# Patient Record
Sex: FEMALE | Race: WHITE | NOT HISPANIC OR LATINO | Employment: OTHER | URBAN - METROPOLITAN AREA
[De-identification: names, ages, dates, MRNs, and addresses within clinical notes are randomized per-mention and may not be internally consistent; named-entity substitution may affect disease eponyms.]

---

## 2017-01-02 DIAGNOSIS — G25.0 ESSENTIAL TREMOR: ICD-10-CM

## 2017-01-04 RX ORDER — PROPRANOLOL HYDROCHLORIDE 120 MG/1
CAPSULE, EXTENDED RELEASE ORAL
Qty: 90 CAPSULE | Refills: 3 | Status: ON HOLD | OUTPATIENT
Start: 2017-01-04 | End: 2017-07-11 | Stop reason: HOSPADM

## 2017-01-10 ENCOUNTER — TELEPHONE (OUTPATIENT)
Dept: FAMILY MEDICINE | Facility: CLINIC | Age: 82
End: 2017-01-10

## 2017-01-10 DIAGNOSIS — N39.0 URINARY TRACT INFECTION WITHOUT HEMATURIA, SITE UNSPECIFIED: Primary | ICD-10-CM

## 2017-01-10 NOTE — TELEPHONE ENCOUNTER
----- Message from Elvis Wilder sent at 1/10/2017  8:06 AM CST -----  Contact: Patient  Patient called regarding refill on pain medication.patient also have questions regarding inpatient rehab.please call back at [phone]. Thanks,

## 2017-01-10 NOTE — TELEPHONE ENCOUNTER
Spoke with patient and she stated that she will be out of pain medication ( hydrocodone) by Friday and she is still doing therapy which is causing her a lot of pain. Requesting more pain meds called in. Also asked if you could get her in to an inpatient rehab sooner that what they told her openings would become available in September. Please advise

## 2017-01-10 NOTE — TELEPHONE ENCOUNTER
Pt is complaining of possible kidney infection, please order UA to fax to [facility].

## 2017-01-16 PROBLEM — I63.9 STROKE: Status: ACTIVE | Noted: 2017-01-16

## 2017-01-16 PROBLEM — I95.9 HYPOTENSION: Status: ACTIVE | Noted: 2017-01-16

## 2017-01-16 PROBLEM — F05 ACUTE CONFUSIONAL STATE: Status: ACTIVE | Noted: 2017-01-16

## 2017-01-16 PROBLEM — R53.1 GENERALIZED WEAKNESS: Status: ACTIVE | Noted: 2017-01-16

## 2017-01-17 PROBLEM — R41.82 ALTERED MENTAL STATUS: Status: ACTIVE | Noted: 2017-01-17

## 2017-01-17 PROBLEM — I27.81 COR PULMONALE, CHRONIC: Status: ACTIVE | Noted: 2017-01-17

## 2017-01-26 PROBLEM — G93.41 ENCEPHALOPATHY, METABOLIC: Status: ACTIVE | Noted: 2017-01-26

## 2017-01-26 PROBLEM — N39.0 URINARY TRACT INFECTION WITHOUT HEMATURIA: Status: ACTIVE | Noted: 2017-01-26

## 2017-02-03 ENCOUNTER — EXTERNAL VISIT (OUTPATIENT)
Dept: FAMILY MEDICINE | Facility: CLINIC | Age: 82
End: 2017-02-03
Payer: MEDICARE

## 2017-02-03 DIAGNOSIS — I10 ESSENTIAL HYPERTENSION: ICD-10-CM

## 2017-02-03 DIAGNOSIS — M50.30 DDD (DEGENERATIVE DISC DISEASE), CERVICAL: ICD-10-CM

## 2017-02-03 DIAGNOSIS — E03.4 HYPOTHYROIDISM DUE TO ACQUIRED ATROPHY OF THYROID: ICD-10-CM

## 2017-02-03 DIAGNOSIS — I69.354 HEMIPARESIS AFFECTING LEFT SIDE AS LATE EFFECT OF CEREBROVASCULAR ACCIDENT: ICD-10-CM

## 2017-02-03 DIAGNOSIS — I25.10 CORONARY ARTERY DISEASE INVOLVING NATIVE CORONARY ARTERY OF NATIVE HEART WITHOUT ANGINA PECTORIS: ICD-10-CM

## 2017-02-03 DIAGNOSIS — R53.1 WEAKNESS GENERALIZED: Primary | ICD-10-CM

## 2017-02-03 DIAGNOSIS — G25.0 ESSENTIAL TREMOR: ICD-10-CM

## 2017-02-03 PROCEDURE — 99305 1ST NF CARE MODERATE MDM 35: CPT | Mod: S$PBB,,, | Performed by: FAMILY MEDICINE

## 2017-03-17 ENCOUNTER — EXTERNAL VISIT (OUTPATIENT)
Dept: FAMILY MEDICINE | Facility: CLINIC | Age: 82
End: 2017-03-17
Payer: MEDICARE

## 2017-03-17 DIAGNOSIS — G25.0 ESSENTIAL TREMOR: ICD-10-CM

## 2017-03-17 DIAGNOSIS — I10 ESSENTIAL HYPERTENSION: ICD-10-CM

## 2017-03-17 DIAGNOSIS — I25.10 CORONARY ARTERY DISEASE INVOLVING NATIVE CORONARY ARTERY OF NATIVE HEART WITHOUT ANGINA PECTORIS: ICD-10-CM

## 2017-03-17 DIAGNOSIS — E03.4 HYPOTHYROIDISM DUE TO ACQUIRED ATROPHY OF THYROID: ICD-10-CM

## 2017-03-17 DIAGNOSIS — I69.354 HEMIPARESIS AFFECTING LEFT SIDE AS LATE EFFECT OF CEREBROVASCULAR ACCIDENT: ICD-10-CM

## 2017-03-17 DIAGNOSIS — R53.1 WEAKNESS GENERALIZED: Primary | ICD-10-CM

## 2017-03-17 DIAGNOSIS — M50.30 DDD (DEGENERATIVE DISC DISEASE), CERVICAL: ICD-10-CM

## 2017-03-17 PROCEDURE — 99308 SBSQ NF CARE LOW MDM 20: CPT | Mod: S$PBB,,, | Performed by: FAMILY MEDICINE

## 2017-04-07 ENCOUNTER — EXTERNAL VISIT (OUTPATIENT)
Dept: FAMILY MEDICINE | Facility: CLINIC | Age: 82
End: 2017-04-07
Payer: MEDICARE

## 2017-04-07 DIAGNOSIS — R53.1 WEAKNESS GENERALIZED: Primary | ICD-10-CM

## 2017-04-07 DIAGNOSIS — I25.10 CORONARY ARTERY DISEASE INVOLVING NATIVE CORONARY ARTERY OF NATIVE HEART WITHOUT ANGINA PECTORIS: ICD-10-CM

## 2017-04-07 DIAGNOSIS — I10 ESSENTIAL HYPERTENSION: ICD-10-CM

## 2017-04-07 DIAGNOSIS — E03.4 HYPOTHYROIDISM DUE TO ACQUIRED ATROPHY OF THYROID: ICD-10-CM

## 2017-04-07 DIAGNOSIS — I69.354 HEMIPARESIS AFFECTING LEFT SIDE AS LATE EFFECT OF CEREBROVASCULAR ACCIDENT: ICD-10-CM

## 2017-04-07 DIAGNOSIS — G25.0 ESSENTIAL TREMOR: ICD-10-CM

## 2017-04-07 DIAGNOSIS — M50.30 DDD (DEGENERATIVE DISC DISEASE), CERVICAL: ICD-10-CM

## 2017-04-07 PROCEDURE — 99308 SBSQ NF CARE LOW MDM 20: CPT | Mod: S$PBB,,, | Performed by: FAMILY MEDICINE

## 2017-04-21 PROBLEM — I95.9 HYPOTENSION: Status: RESOLVED | Noted: 2017-01-16 | Resolved: 2017-04-21

## 2017-04-21 PROBLEM — G93.41 ENCEPHALOPATHY, METABOLIC: Status: RESOLVED | Noted: 2017-01-26 | Resolved: 2017-04-21

## 2017-04-21 PROBLEM — F05 ACUTE CONFUSIONAL STATE: Status: RESOLVED | Noted: 2017-01-16 | Resolved: 2017-04-21

## 2017-04-21 PROBLEM — I63.9 STROKE: Status: RESOLVED | Noted: 2017-01-16 | Resolved: 2017-04-21

## 2017-04-21 PROBLEM — R53.1 GENERALIZED WEAKNESS: Status: RESOLVED | Noted: 2017-01-16 | Resolved: 2017-04-21

## 2017-04-21 PROBLEM — N39.0 URINARY TRACT INFECTION WITHOUT HEMATURIA: Status: RESOLVED | Noted: 2017-01-26 | Resolved: 2017-04-21

## 2017-04-21 PROBLEM — I27.81 COR PULMONALE, CHRONIC: Status: RESOLVED | Noted: 2017-01-17 | Resolved: 2017-04-21

## 2017-04-21 PROBLEM — R41.82 ALTERED MENTAL STATUS: Status: RESOLVED | Noted: 2017-01-17 | Resolved: 2017-04-21

## 2017-04-21 NOTE — PROGRESS NOTES
[facility] Nursing Home Visit    Subjective:       Patient ID: Martha Peres is a 84 y.o. female.    HPI Comments: Admitted to rehabilitate from recent hospitalization for UTI/weakness.  She is doing PT and progressing well with intention to return home.      Past Medical History:   Diagnosis Date    Anticoagulant long-term use     Arthritis     Benign essential tremor 2000    Bilateral    Breast cancer     left , no B/p or sticks to left    Cataract     ou done    Colon polyps     Coronary artery disease     2 stents    DDD (degenerative disc disease), lumbar     Encounter for blood transfusion     GERD (gastroesophageal reflux disease)     Glaucoma     left eye    Hemiparesis affecting left side as late effect of cerebrovascular accident     HLD (hyperlipidemia)     HTN (hypertension)     Hyperparathyroidism     Hypothyroidism     Mobility impaired     uses wheelchair    Neck pain     radiating to both shoulders and arms    Osteoporosis     PVD (peripheral vascular disease)     Stroke     August 2016       Past Surgical History:   Procedure Laterality Date    APPENDECTOMY      BLADDER SUSPENSION      BREAST LUMPECTOMY      no bp/iv left arm    CARDIAC SURGERY      COLONOSCOPY      CORONARY ANGIOPLASTY WITH STENT PLACEMENT      x 2    Epidural steroid injection      Pain managment    EYE SURGERY      bilat phaco with iol    TONSILLECTOMY      TOTAL ABDOMINAL HYSTERECTOMY         Review of patient's allergies indicates:   Allergen Reactions    No known drug allergies        Social History     Social History    Marital status:      Spouse name: N/A    Number of children: N/A    Years of education: N/A     Occupational History    Not on file.     Social History Main Topics    Smoking status: Never Smoker    Smokeless tobacco: Never Used    Alcohol use No    Drug use: No    Sexual activity: Not on file     Other Topics Concern    Not on file     Social History  Narrative       Current Outpatient Prescriptions on File Prior to Visit   Medication Sig Dispense Refill    ASCORBATE CALCIUM (VITAMIN C ORAL) Take 1 tablet by mouth once daily.       aspirin (ECOTRIN) 81 MG EC tablet Take 1 tablet by mouth once daily.       atorvastatin (LIPITOR) 10 MG tablet TAKE ONE TABLET BY MOUTH EVERY EVENING 30 tablet 4    brimonidine 0.2% (ALPHAGAN) 0.2 % Drop Place 1 drop into both eyes 2 (two) times daily. 10 mL 11    calcium carbonate-vitamin D3 (CALCIUM 600 WITH VITAMIN D3) 600 mg(1,500mg) -500 unit Cap Take 2 tablets by mouth once daily.      clopidogrel (PLAVIX) 75 mg tablet Take 1 tablet (75 mg total) by mouth once daily. 30 tablet 11    dorzolamide (TRUSOPT) 2 % ophthalmic solution INSTILL 1 DROP IN BOTH EYES TWICE DAILY 10 mL 11    ergocalciferol (VITAMIN D2) 50,000 unit Cap Take 50,000 Units by mouth every 7 days. Not sure of dosage//      gabapentin (NEURONTIN) 300 MG capsule 1 tab PO qhs x1 week, then 1 tab PO bid x1 week, then 1 tab PO tid thereafter (Patient taking differently: Take 300 mg by mouth. 1 tab PO qhs x1 week, then 1 tab PO bid x1 week, then 1 tab PO tid thereafter) 90 capsule 2    latanoprost 0.005 % ophthalmic solution PLACE 1 DROP IN BOTH EYES ONCE DAILY AT BEDTIME 7.5 mL 3    levothyroxine (SYNTHROID) 50 MCG tablet TAKE ONE TABLET BY MOUTH BEFORE breakfast 30 tablet 4    lisinopril (PRINIVIL,ZESTRIL) 20 MG tablet Take 1 tablet (20 mg total) by mouth once daily. 30 tablet 5    multivitamin-minerals-lutein Tab Take 1 tablet by mouth once daily.      ondansetron (ZOFRAN) 4 MG tablet Take 1 tablet (4 mg total) by mouth every 8 (eight) hours as needed for Nausea. 30 tablet 0    oxybutynin (DITROPAN) 5 MG Tab Take 1 tablet (5 mg total) by mouth 3 (three) times daily. As needed for bladder spasms. 12 tablet 0    propranolol (INDERAL LA) 120 MG 24 hr capsule TAKE ONE CAPSULE BY MOUTH EVERY DAY 90 capsule 3    rabeprazole (ACIPHEX) 20 mg tablet Take 1  tablet (20 mg total) by mouth every morning. 90 tablet 3     No current facility-administered medications on file prior to visit.        Family History   Problem Relation Age of Onset    Essential Tremor Father     Stroke Father     Heart attack Father     Essential Tremor Sister     Stroke Sister     Essential Tremor Brother     Stroke Brother     Heart attack Brother     Stroke Mother     Heart attack Mother     Stroke Brother     Heart attack Brother     Stroke Brother     Heart attack Brother     Stroke Brother     Heart attack Brother     Stroke Brother     Heart attack Brother     Stroke Sister     Heart disease Sister     Stroke Sister     Heart attack Sister     Stroke Sister     Heart attack Sister     Essential Tremor Daughter     Essential Tremor Son     Anesthesia problems Neg Hx     Clotting disorder Neg Hx     Amblyopia Neg Hx     Blindness Neg Hx     Cancer Neg Hx     Cataracts Neg Hx     Diabetes Neg Hx     Glaucoma Neg Hx     Hypertension Neg Hx     Macular degeneration Neg Hx     Retinal detachment Neg Hx     Strabismus Neg Hx        Review of Systems   Constitutional: Negative for appetite change, chills, fever and unexpected weight change.   HENT: Negative for sore throat and trouble swallowing.    Eyes: Negative for pain and visual disturbance.   Respiratory: Negative for cough, shortness of breath and wheezing.    Cardiovascular: Negative for chest pain and palpitations.   Gastrointestinal: Negative for abdominal pain, blood in stool, diarrhea, nausea and vomiting.   Genitourinary: Negative for difficulty urinating, dysuria and hematuria.   Musculoskeletal: Negative for arthralgias, gait problem and neck pain.   Skin: Negative for rash and wound.   Neurological: Positive for weakness (left arm and leg). Negative for dizziness, numbness and headaches.   Hematological: Negative for adenopathy.   Psychiatric/Behavioral: Negative for dysphoric mood.        Objective:        Physical Exam   Constitutional: She is oriented to person, place, and time. She appears well-developed and well-nourished.   HENT:   Head: Normocephalic.   Mouth/Throat: Oropharynx is clear and moist. No oropharyngeal exudate or posterior oropharyngeal erythema.   Eyes: Conjunctivae and EOM are normal. Pupils are equal, round, and reactive to light.   Neck: Normal range of motion. Neck supple. No thyromegaly present.   Cardiovascular: Normal rate, regular rhythm, S1 normal, S2 normal, normal heart sounds and intact distal pulses.  Exam reveals no gallop and no friction rub.    No murmur heard.  Pulmonary/Chest: Effort normal and breath sounds normal. She has no wheezes. She has no rales.   Abdominal: Normal appearance.   Musculoskeletal:        Right lower leg: She exhibits no edema.        Left lower leg: She exhibits no edema.   Lymphadenopathy:     She has no cervical adenopathy.   Neurological: She is alert and oriented to person, place, and time. No cranial nerve deficit. Coordination and gait abnormal.   Left arm and leg wekaness   Skin: Skin is warm and intact. No rash noted.   Psychiatric: She has a normal mood and affect.       Assessment:       1. Weakness generalized    2. Essential tremor    3. Coronary artery disease involving native coronary artery of native heart without angina pectoris    4. DDD (degenerative disc disease), cervical    5. Essential hypertension    6. Hypothyroidism due to acquired atrophy of thyroid    7. Hemiparesis affecting left side as late effect of cerebrovascular accident        Plan:       Weakness generalized    Essential tremor    Coronary artery disease involving native coronary artery of native heart without angina pectoris    DDD (degenerative disc disease), cervical    Essential hypertension    Hypothyroidism due to acquired atrophy of thyroid    Hemiparesis affecting left side as late effect of cerebrovascular accident        Currently  stable  Continue PT with intention to return home  Continue present medications and specialty follow-up  Will see monthly or as needed        Houston Sheppard M.D.  [facility]

## 2017-04-30 NOTE — PROGRESS NOTES
[facility] Nursing Home Visit    Subjective:       Patient ID: Martha Peres is a 84 y.o. female.    HPI Comments: Admitted to rehabilitate from hospitalization for UTI/weakness.  She is doing PT and progressing well with intention to return home.  No new issues.      Past Medical History:   Diagnosis Date    Anticoagulant long-term use     Arthritis     Benign essential tremor 2000    Bilateral    Breast cancer     left , no B/p or sticks to left    Cataract     ou done    Colon polyps     Coronary artery disease     2 stents    DDD (degenerative disc disease), lumbar     Encounter for blood transfusion     GERD (gastroesophageal reflux disease)     Glaucoma     left eye    Hemiparesis affecting left side as late effect of cerebrovascular accident     HLD (hyperlipidemia)     HTN (hypertension)     Hyperparathyroidism     Hypothyroidism     Mobility impaired     uses wheelchair    Neck pain     radiating to both shoulders and arms    Osteoporosis     PVD (peripheral vascular disease)     Stroke     August 2016       Past Surgical History:   Procedure Laterality Date    APPENDECTOMY      BLADDER SUSPENSION      BREAST LUMPECTOMY      no bp/iv left arm    CARDIAC SURGERY      COLONOSCOPY      CORONARY ANGIOPLASTY WITH STENT PLACEMENT      x 2    Epidural steroid injection      Pain managment    EYE SURGERY      bilat phaco with iol    TONSILLECTOMY      TOTAL ABDOMINAL HYSTERECTOMY         Review of patient's allergies indicates:   Allergen Reactions    No known drug allergies        Social History     Social History    Marital status:      Spouse name: N/A    Number of children: N/A    Years of education: N/A     Occupational History    Not on file.     Social History Main Topics    Smoking status: Never Smoker    Smokeless tobacco: Never Used    Alcohol use No    Drug use: No    Sexual activity: Not on file     Other Topics Concern    Not on file     Social History  Narrative       Current Outpatient Prescriptions on File Prior to Visit   Medication Sig Dispense Refill    ASCORBATE CALCIUM (VITAMIN C ORAL) Take 1 tablet by mouth once daily.       aspirin (ECOTRIN) 81 MG EC tablet Take 1 tablet by mouth once daily.       atorvastatin (LIPITOR) 10 MG tablet TAKE ONE TABLET BY MOUTH EVERY EVENING 30 tablet 4    brimonidine 0.2% (ALPHAGAN) 0.2 % Drop Place 1 drop into both eyes 2 (two) times daily. 10 mL 11    calcium carbonate-vitamin D3 (CALCIUM 600 WITH VITAMIN D3) 600 mg(1,500mg) -500 unit Cap Take 2 tablets by mouth once daily.      clopidogrel (PLAVIX) 75 mg tablet Take 1 tablet (75 mg total) by mouth once daily. 30 tablet 11    dorzolamide (TRUSOPT) 2 % ophthalmic solution INSTILL 1 DROP IN BOTH EYES TWICE DAILY 10 mL 11    ergocalciferol (VITAMIN D2) 50,000 unit Cap Take 50,000 Units by mouth every 7 days. Not sure of dosage//      gabapentin (NEURONTIN) 300 MG capsule 1 tab PO qhs x1 week, then 1 tab PO bid x1 week, then 1 tab PO tid thereafter (Patient taking differently: Take 300 mg by mouth. 1 tab PO qhs x1 week, then 1 tab PO bid x1 week, then 1 tab PO tid thereafter) 90 capsule 2    latanoprost 0.005 % ophthalmic solution PLACE 1 DROP IN BOTH EYES ONCE DAILY AT BEDTIME 7.5 mL 3    levothyroxine (SYNTHROID) 50 MCG tablet TAKE ONE TABLET BY MOUTH BEFORE breakfast 30 tablet 4    lisinopril (PRINIVIL,ZESTRIL) 20 MG tablet Take 1 tablet (20 mg total) by mouth once daily. 30 tablet 5    multivitamin-minerals-lutein Tab Take 1 tablet by mouth once daily.      ondansetron (ZOFRAN) 4 MG tablet Take 1 tablet (4 mg total) by mouth every 8 (eight) hours as needed for Nausea. 30 tablet 0    oxybutynin (DITROPAN) 5 MG Tab Take 1 tablet (5 mg total) by mouth 3 (three) times daily. As needed for bladder spasms. 12 tablet 0    propranolol (INDERAL LA) 120 MG 24 hr capsule TAKE ONE CAPSULE BY MOUTH EVERY DAY 90 capsule 3    rabeprazole (ACIPHEX) 20 mg tablet Take 1  tablet (20 mg total) by mouth every morning. 90 tablet 3     No current facility-administered medications on file prior to visit.        Family History   Problem Relation Age of Onset    Essential Tremor Father     Stroke Father     Heart attack Father     Essential Tremor Sister     Stroke Sister     Essential Tremor Brother     Stroke Brother     Heart attack Brother     Stroke Mother     Heart attack Mother     Stroke Brother     Heart attack Brother     Stroke Brother     Heart attack Brother     Stroke Brother     Heart attack Brother     Stroke Brother     Heart attack Brother     Stroke Sister     Heart disease Sister     Stroke Sister     Heart attack Sister     Stroke Sister     Heart attack Sister     Essential Tremor Daughter     Essential Tremor Son     Anesthesia problems Neg Hx     Clotting disorder Neg Hx     Amblyopia Neg Hx     Blindness Neg Hx     Cancer Neg Hx     Cataracts Neg Hx     Diabetes Neg Hx     Glaucoma Neg Hx     Hypertension Neg Hx     Macular degeneration Neg Hx     Retinal detachment Neg Hx     Strabismus Neg Hx        Review of Systems   Constitutional: Negative for appetite change, chills, fever and unexpected weight change.   HENT: Negative for sore throat and trouble swallowing.    Eyes: Negative for pain and visual disturbance.   Respiratory: Negative for cough, shortness of breath and wheezing.    Cardiovascular: Negative for chest pain and palpitations.   Gastrointestinal: Negative for abdominal pain, blood in stool, diarrhea, nausea and vomiting.   Genitourinary: Negative for difficulty urinating, dysuria and hematuria.   Musculoskeletal: Negative for arthralgias, gait problem and neck pain.   Skin: Negative for rash and wound.   Neurological: Positive for weakness (left arm and leg). Negative for dizziness, numbness and headaches.   Hematological: Negative for adenopathy.   Psychiatric/Behavioral: Negative for dysphoric mood.        Objective:        Physical Exam   Constitutional: She is oriented to person, place, and time. She appears well-developed and well-nourished.   HENT:   Head: Normocephalic.   Mouth/Throat: Oropharynx is clear and moist. No oropharyngeal exudate or posterior oropharyngeal erythema.   Eyes: Conjunctivae and EOM are normal. Pupils are equal, round, and reactive to light.   Neck: Normal range of motion. Neck supple. No thyromegaly present.   Cardiovascular: Normal rate, regular rhythm, S1 normal, S2 normal, normal heart sounds and intact distal pulses.  Exam reveals no gallop and no friction rub.    No murmur heard.  Pulmonary/Chest: Effort normal and breath sounds normal. She has no wheezes. She has no rales.   Abdominal: Normal appearance.   Musculoskeletal:        Right lower leg: She exhibits no edema.        Left lower leg: She exhibits no edema.   Lymphadenopathy:     She has no cervical adenopathy.   Neurological: She is alert and oriented to person, place, and time. No cranial nerve deficit. Coordination and gait abnormal.   Left arm and leg wekaness   Skin: Skin is warm and intact. No rash noted.   Psychiatric: She has a normal mood and affect.       Assessment:       1. Weakness generalized    2. Essential tremor    3. Coronary artery disease involving native coronary artery of native heart without angina pectoris    4. DDD (degenerative disc disease), cervical    5. Essential hypertension    6. Hypothyroidism due to acquired atrophy of thyroid    7. Hemiparesis affecting left side as late effect of cerebrovascular accident        Plan:       Weakness generalized    Essential tremor    Coronary artery disease involving native coronary artery of native heart without angina pectoris    DDD (degenerative disc disease), cervical    Essential hypertension    Hypothyroidism due to acquired atrophy of thyroid    Hemiparesis affecting left side as late effect of cerebrovascular accident        Currently  stable  Continue PT with intention to return home  Continue present medications and specialty follow-up  Will see monthly or as needed        Houston Sheppard M.D.  [facility]

## 2017-04-30 NOTE — PROGRESS NOTES
[facility] Visit    Subjective:       Patient ID: Martha Peres is a 84 y.o. female.    HPI Comments: Admitted to rehabilitate from hospitalization for UTI/weakness.  She is doing PT and progressing well with intention to return home.  No new issues.      Past Medical History:   Diagnosis Date    Anticoagulant long-term use     Arthritis     Benign essential tremor 2000    Bilateral    Breast cancer     left , no B/p or sticks to left    Cataract     ou done    Colon polyps     Coronary artery disease     2 stents    DDD (degenerative disc disease), lumbar     Encounter for blood transfusion     GERD (gastroesophageal reflux disease)     Glaucoma     left eye    Hemiparesis affecting left side as late effect of cerebrovascular accident     HLD (hyperlipidemia)     HTN (hypertension)     Hyperparathyroidism     Hypothyroidism     Mobility impaired     uses wheelchair    Neck pain     radiating to both shoulders and arms    Osteoporosis     PVD (peripheral vascular disease)     Stroke     August 2016       Past Surgical History:   Procedure Laterality Date    APPENDECTOMY      BLADDER SUSPENSION      BREAST LUMPECTOMY      no bp/iv left arm    CARDIAC SURGERY      COLONOSCOPY      CORONARY ANGIOPLASTY WITH STENT PLACEMENT      x 2    Epidural steroid injection      Pain managment    EYE SURGERY      bilat phaco with iol    TONSILLECTOMY      TOTAL ABDOMINAL HYSTERECTOMY         Review of patient's allergies indicates:   Allergen Reactions    No known drug allergies        Social History     Social History    Marital status:      Spouse name: N/A    Number of children: N/A    Years of education: N/A     Occupational History    Not on file.     Social History Main Topics    Smoking status: Never Smoker    Smokeless tobacco: Never Used    Alcohol use No    Drug use: No    Sexual activity: Not on file     Other Topics Concern    Not on file     Social History  Narrative       Current Outpatient Prescriptions on File Prior to Visit   Medication Sig Dispense Refill    ASCORBATE CALCIUM (VITAMIN C ORAL) Take 1 tablet by mouth once daily.       aspirin (ECOTRIN) 81 MG EC tablet Take 1 tablet by mouth once daily.       atorvastatin (LIPITOR) 10 MG tablet TAKE ONE TABLET BY MOUTH EVERY EVENING 30 tablet 4    brimonidine 0.2% (ALPHAGAN) 0.2 % Drop Place 1 drop into both eyes 2 (two) times daily. 10 mL 11    calcium carbonate-vitamin D3 (CALCIUM 600 WITH VITAMIN D3) 600 mg(1,500mg) -500 unit Cap Take 2 tablets by mouth once daily.      clopidogrel (PLAVIX) 75 mg tablet Take 1 tablet (75 mg total) by mouth once daily. 30 tablet 11    dorzolamide (TRUSOPT) 2 % ophthalmic solution INSTILL 1 DROP IN BOTH EYES TWICE DAILY 10 mL 11    ergocalciferol (VITAMIN D2) 50,000 unit Cap Take 50,000 Units by mouth every 7 days. Not sure of dosage//      gabapentin (NEURONTIN) 300 MG capsule 1 tab PO qhs x1 week, then 1 tab PO bid x1 week, then 1 tab PO tid thereafter (Patient taking differently: Take 300 mg by mouth. 1 tab PO qhs x1 week, then 1 tab PO bid x1 week, then 1 tab PO tid thereafter) 90 capsule 2    latanoprost 0.005 % ophthalmic solution PLACE 1 DROP IN BOTH EYES ONCE DAILY AT BEDTIME 7.5 mL 3    levothyroxine (SYNTHROID) 50 MCG tablet TAKE ONE TABLET BY MOUTH BEFORE breakfast 30 tablet 4    lisinopril (PRINIVIL,ZESTRIL) 20 MG tablet Take 1 tablet (20 mg total) by mouth once daily. 30 tablet 5    multivitamin-minerals-lutein Tab Take 1 tablet by mouth once daily.      ondansetron (ZOFRAN) 4 MG tablet Take 1 tablet (4 mg total) by mouth every 8 (eight) hours as needed for Nausea. 30 tablet 0    oxybutynin (DITROPAN) 5 MG Tab Take 1 tablet (5 mg total) by mouth 3 (three) times daily. As needed for bladder spasms. 12 tablet 0    propranolol (INDERAL LA) 120 MG 24 hr capsule TAKE ONE CAPSULE BY MOUTH EVERY DAY 90 capsule 3    rabeprazole (ACIPHEX) 20 mg tablet Take 1  tablet (20 mg total) by mouth every morning. 90 tablet 3     No current facility-administered medications on file prior to visit.        Family History   Problem Relation Age of Onset    Essential Tremor Father     Stroke Father     Heart attack Father     Essential Tremor Sister     Stroke Sister     Essential Tremor Brother     Stroke Brother     Heart attack Brother     Stroke Mother     Heart attack Mother     Stroke Brother     Heart attack Brother     Stroke Brother     Heart attack Brother     Stroke Brother     Heart attack Brother     Stroke Brother     Heart attack Brother     Stroke Sister     Heart disease Sister     Stroke Sister     Heart attack Sister     Stroke Sister     Heart attack Sister     Essential Tremor Daughter     Essential Tremor Son     Anesthesia problems Neg Hx     Clotting disorder Neg Hx     Amblyopia Neg Hx     Blindness Neg Hx     Cancer Neg Hx     Cataracts Neg Hx     Diabetes Neg Hx     Glaucoma Neg Hx     Hypertension Neg Hx     Macular degeneration Neg Hx     Retinal detachment Neg Hx     Strabismus Neg Hx        Review of Systems   Constitutional: Negative for appetite change, chills, fever and unexpected weight change.   HENT: Negative for sore throat and trouble swallowing.    Eyes: Negative for pain and visual disturbance.   Respiratory: Negative for cough, shortness of breath and wheezing.    Cardiovascular: Negative for chest pain and palpitations.   Gastrointestinal: Negative for abdominal pain, blood in stool, diarrhea, nausea and vomiting.   Genitourinary: Negative for difficulty urinating, dysuria and hematuria.   Musculoskeletal: Negative for arthralgias, gait problem and neck pain.   Skin: Negative for rash and wound.   Neurological: Positive for weakness (left arm and leg). Negative for dizziness, numbness and headaches.   Hematological: Negative for adenopathy.   Psychiatric/Behavioral: Negative for dysphoric mood.        Objective:        Physical Exam   Constitutional: She is oriented to person, place, and time. She appears well-developed and well-nourished.   HENT:   Head: Normocephalic.   Mouth/Throat: Oropharynx is clear and moist. No oropharyngeal exudate or posterior oropharyngeal erythema.   Eyes: Conjunctivae and EOM are normal. Pupils are equal, round, and reactive to light.   Neck: Normal range of motion. Neck supple. No thyromegaly present.   Cardiovascular: Normal rate, regular rhythm, S1 normal, S2 normal, normal heart sounds and intact distal pulses.  Exam reveals no gallop and no friction rub.    No murmur heard.  Pulmonary/Chest: Effort normal and breath sounds normal. She has no wheezes. She has no rales.   Abdominal: Normal appearance.   Musculoskeletal:        Right lower leg: She exhibits no edema.        Left lower leg: She exhibits no edema.   Lymphadenopathy:     She has no cervical adenopathy.   Neurological: She is alert and oriented to person, place, and time. No cranial nerve deficit. Coordination and gait abnormal.   Left arm and leg wekaness   Skin: Skin is warm and intact. No rash noted.   Psychiatric: She has a normal mood and affect.       Assessment:       1. Weakness generalized    2. Essential tremor    3. Coronary artery disease involving native coronary artery of native heart without angina pectoris    4. DDD (degenerative disc disease), cervical    5. Essential hypertension    6. Hypothyroidism due to acquired atrophy of thyroid    7. Hemiparesis affecting left side as late effect of cerebrovascular accident        Plan:       Weakness generalized    Essential tremor    Coronary artery disease involving native coronary artery of native heart without angina pectoris    DDD (degenerative disc disease), cervical    Essential hypertension    Hypothyroidism due to acquired atrophy of thyroid    Hemiparesis affecting left side as late effect of cerebrovascular accident        Currently  stable  Continue PT with intention to return home  Continue present medications and specialty follow-up  Will see monthly or as needed        Houston Sheppard M.D.  [facility]

## 2017-05-08 ENCOUNTER — TELEPHONE (OUTPATIENT)
Dept: FAMILY MEDICINE | Facility: CLINIC | Age: 82
End: 2017-05-08

## 2017-05-08 NOTE — TELEPHONE ENCOUNTER
----- Message from Agueda Baxter sent at 5/8/2017 11:08 AM CDT -----  Contact: self [phone]  She was just released from [facility], they instructed her to follow up with you in 2 weeks.  Please call her about fitting her in.  Thank you!

## 2017-05-10 ENCOUNTER — TELEPHONE (OUTPATIENT)
Dept: FAMILY MEDICINE | Facility: CLINIC | Age: 82
End: 2017-05-10

## 2017-05-10 DIAGNOSIS — R30.0 DYSURIA: Primary | ICD-10-CM

## 2017-05-10 NOTE — TELEPHONE ENCOUNTER
----- Message from Dariela Parada sent at 5/10/2017  2:11 PM CDT -----  Contact: Araceli alfonso/ [facility]  Araceli wants to speak with a nurse regarding orders for a urine test. Please call back at [phone]

## 2017-05-10 NOTE — TELEPHONE ENCOUNTER
Left message for  nurse to call clinic, No dx left in message, Patient is scheduled for follow up appointment with Dr Sheppard on 05/19. No sooner appointment available.

## 2017-05-11 ENCOUNTER — LAB VISIT (OUTPATIENT)
Dept: LAB | Facility: HOSPITAL | Age: 82
End: 2017-05-11
Attending: FAMILY MEDICINE
Payer: MEDICARE

## 2017-05-11 DIAGNOSIS — I63.9 IMPENDING CEREBROVASCULAR ACCIDENT: Primary | ICD-10-CM

## 2017-05-11 LAB
BACTERIA #/AREA URNS HPF: ABNORMAL /HPF
BILIRUB UR QL STRIP: NEGATIVE
CLARITY UR: ABNORMAL
COLOR UR: YELLOW
GLUCOSE UR QL STRIP: NEGATIVE
HGB UR QL STRIP: NEGATIVE
KETONES UR QL STRIP: NEGATIVE
LEUKOCYTE ESTERASE UR QL STRIP: NEGATIVE
MICROSCOPIC COMMENT: ABNORMAL
NITRITE UR QL STRIP: NEGATIVE
PH UR STRIP: 7 [PH] (ref 5–8)
PROT UR QL STRIP: NEGATIVE
RBC #/AREA URNS HPF: 2 /HPF (ref 0–4)
SP GR UR STRIP: 1.01 (ref 1–1.03)
SQUAMOUS #/AREA URNS HPF: 1 /HPF
URN SPEC COLLECT METH UR: ABNORMAL
WBC #/AREA URNS HPF: 2 /HPF (ref 0–5)

## 2017-05-11 PROCEDURE — 81000 URINALYSIS NONAUTO W/SCOPE: CPT | Mod: PO

## 2017-05-11 PROCEDURE — 87086 URINE CULTURE/COLONY COUNT: CPT

## 2017-05-11 NOTE — TELEPHONE ENCOUNTER
----- Message from Lottie Crystal sent at 5/11/2017  9:50 AM CDT -----  Contact: [facility],Cristy Muniz wants to speak with a nurse regarding patient having UTI please call back at [phone]

## 2017-05-12 LAB
BACTERIA UR CULT: NORMAL
BACTERIA UR CULT: NORMAL

## 2017-05-19 ENCOUNTER — OFFICE VISIT (OUTPATIENT)
Dept: FAMILY MEDICINE | Facility: CLINIC | Age: 82
End: 2017-05-19
Payer: MEDICARE

## 2017-05-19 VITALS
SYSTOLIC BLOOD PRESSURE: 138 MMHG | HEART RATE: 60 BPM | TEMPERATURE: 98 F | BODY MASS INDEX: 19.2 KG/M2 | DIASTOLIC BLOOD PRESSURE: 60 MMHG | WEIGHT: 105 LBS

## 2017-05-19 DIAGNOSIS — I69.354 HEMIPARESIS AFFECTING LEFT SIDE AS LATE EFFECT OF CEREBROVASCULAR ACCIDENT: Primary | ICD-10-CM

## 2017-05-19 DIAGNOSIS — E03.4 HYPOTHYROIDISM DUE TO ACQUIRED ATROPHY OF THYROID: ICD-10-CM

## 2017-05-19 DIAGNOSIS — I10 ESSENTIAL HYPERTENSION: ICD-10-CM

## 2017-05-19 DIAGNOSIS — E78.5 DYSLIPIDEMIA: ICD-10-CM

## 2017-05-19 DIAGNOSIS — M50.30 DDD (DEGENERATIVE DISC DISEASE), CERVICAL: ICD-10-CM

## 2017-05-19 PROCEDURE — 99213 OFFICE O/P EST LOW 20 MIN: CPT | Mod: PBBFAC,PO | Performed by: FAMILY MEDICINE

## 2017-05-19 PROCEDURE — 99999 PR PBB SHADOW E&M-EST. PATIENT-LVL III: CPT | Mod: PBBFAC,,, | Performed by: FAMILY MEDICINE

## 2017-05-19 PROCEDURE — 99214 OFFICE O/P EST MOD 30 MIN: CPT | Mod: S$PBB,,, | Performed by: FAMILY MEDICINE

## 2017-05-19 RX ORDER — TRAMADOL HYDROCHLORIDE 50 MG/1
50 TABLET ORAL EVERY 6 HOURS PRN
Qty: 60 TABLET | Refills: 5 | Status: SHIPPED | OUTPATIENT
Start: 2017-05-19 | End: 2017-07-26 | Stop reason: SDUPTHER

## 2017-05-19 RX ORDER — TRAMADOL HYDROCHLORIDE 50 MG/1
50 TABLET ORAL EVERY 6 HOURS PRN
COMMUNITY
End: 2017-05-19 | Stop reason: SDUPTHER

## 2017-05-19 NOTE — MR AVS SNAPSHOT
[facility]  [address] [facility] [address]  [facility] [address]  Phone: [phone]  Fax: [phone]                  Martha Peres   2017 11:00 AM   Office Visit    Description:  Female : 1932   Provider:  Houston Sheppard MD   Department:  [facility]           Reason for Visit     Discharged from [facility]           Diagnoses this Visit        Comments    Hemiparesis affecting left side as late effect of cerebrovascular accident    -  Primary     DDD (degenerative disc disease), cervical                To Do List           Future Appointments        Provider Department Dept Phone    2017 10:20 AM Houston Sheppard MD [facility] [phone]      Goals (5 Years of Data)     None      Follow-Up and Disposition     Return in about 4 weeks (around 2017).       These Medications        Disp Refills Start End    tramadol (ULTRAM) 50 mg tablet 60 tablet 5 2017     Take 1 tablet (50 mg total) by mouth every 6 (six) hours as needed for Pain. - Oral    Pharmacy: [facility] [id number] [place] [address] #: [phone]         [facility]     [facility] On Call Nurse Care Line -  Assistance  Unless otherwise directed by your provider, please contact [facility], our nurse care line that is available for  assistance.     Registered nurses in the [facility] provide: appointment scheduling, clinical advisement, health education, and other advisory services.  Call: [phone] (toll free)               Medications           Message regarding Medications     Verify the changes and/or additions to your medication regime listed below are the same as discussed with your clinician today.  If any of these changes or additions are incorrect, please notify your healthcare provider.        START taking these NEW medications        Refills    tramadol (ULTRAM) 50 mg tablet 5     Sig: Take 1 tablet (50 mg total) by mouth every 6 (six) hours as needed for Pain.    Class: Normal    Route: Oral           Verify that the below list of medications is an accurate representation of the medications you are currently taking.  If none reported, the list may be blank. If incorrect, please contact your healthcare provider. Carry this list with you in case of emergency.           Current Medications     ASCORBATE CALCIUM (VITAMIN C ORAL) Take 1 tablet by mouth once daily.     aspirin (ECOTRIN) 81 MG EC tablet Take 1 tablet by mouth once daily.     atorvastatin (LIPITOR) 10 MG tablet TAKE ONE TABLET BY MOUTH EVERY EVENING    brimonidine 0.2% (ALPHAGAN) 0.2 % Drop Place 1 drop into both eyes 2 (two) times daily.    calcium carbonate-vitamin D3 (CALCIUM 600 WITH VITAMIN D3) 600 mg(1,500mg) -500 unit Cap Take 2 tablets by mouth once daily.    clopidogrel (PLAVIX) 75 mg tablet Take 1 tablet (75 mg total) by mouth once daily.    dorzolamide (TRUSOPT) 2 % ophthalmic solution INSTILL 1 DROP IN BOTH EYES TWICE DAILY    ergocalciferol (VITAMIN D2) 50,000 unit Cap Take 50,000 Units by mouth every 7 days. Not sure of dosage//    gabapentin (NEURONTIN) 300 MG capsule 1 tab PO qhs x1 week, then 1 tab PO bid x1 week, then 1 tab PO tid thereafter    latanoprost 0.005 % ophthalmic solution PLACE 1 DROP IN BOTH EYES ONCE DAILY AT BEDTIME    levothyroxine (SYNTHROID) 50 MCG tablet TAKE ONE TABLET BY MOUTH BEFORE breakfast    lisinopril (PRINIVIL,ZESTRIL) 20 MG tablet Take 1 tablet (20 mg total) by mouth once daily.    multivitamin-minerals-lutein Tab Take 1 tablet by mouth once daily.    oxybutynin (DITROPAN) 5 MG Tab Take 1 tablet (5 mg total) by mouth 3 (three) times daily. As needed for bladder spasms.    propranolol (INDERAL LA) 120 MG 24 hr capsule TAKE ONE CAPSULE BY MOUTH EVERY DAY    rabeprazole (ACIPHEX) 20 mg tablet Take 1 tablet (20 mg total) by mouth every morning.    tramadol (ULTRAM) 50 mg tablet Take 1  tablet (50 mg total) by mouth every 6 (six) hours as needed for Pain.    ondansetron (ZOFRAN) 4 MG tablet Take 1 tablet (4 mg total) by mouth every 8 (eight) hours as needed for Nausea.           Clinical Reference Information           Your Vitals Were     BP Pulse Temp Weight BMI    138/60 (BP Location: Right arm, Patient Position: Sitting, BP Method: Manual) 60 97.8 °F (36.6 °C) (Oral) 47.6 kg (105 lb) 19.2 kg/m2      Blood Pressure          Most Recent Value    BP  138/60      Allergies as of 5/19/2017     No Known Drug Allergies      Immunizations Administered on Date of Encounter - 5/19/2017     None      Orders Placed During Today's Visit      Normal Orders This Visit    WALKER FOR HOME USE       Language Assistance Services     ATTENTION: Language assistance services are available, free of charge. Please call [phone].      ATENCIÓN: Si denverla kasey, tiene a pang disposición servicios gratuitos de asistencia lingüística. Llame al [phone].     CHÚ Ý: N?u b?n nói Ti?ng Vi?t, có các d?ch v? h? tr? ngôn ng? mi?n phí dành cho b?n. G?i s? [phone].         [facility] complies with applicable Federal civil rights laws and does not discriminate on the basis of race, color, national origin, age, disability, or sex.

## 2017-05-19 NOTE — PROGRESS NOTES
[facility] Visit    Subjective:       Patient ID: Martha Peres is a 84 y.o. female.    HPI Comments: Here to f/u after recent admission to [facility].  She has transitioned back to home.  She has a 24 hour sitter.  Son lives next door.  Getting [facility] with PT/OT.  She needs a nas-walker      Past Medical History:   Diagnosis Date    Anticoagulant long-term use     Arthritis     Benign essential tremor 2000    Bilateral    Breast cancer     left , no B/p or sticks to left    Cataract     ou done    Colon polyps     Coronary artery disease     2 stents    DDD (degenerative disc disease), lumbar     Encounter for blood transfusion     GERD (gastroesophageal reflux disease)     Glaucoma     left eye    Hemiparesis affecting left side as late effect of cerebrovascular accident     HLD (hyperlipidemia)     HTN (hypertension)     Hyperparathyroidism     Hypothyroidism     Mobility impaired     uses wheelchair    Neck pain     radiating to both shoulders and arms    Osteoporosis     PVD (peripheral vascular disease)     Stroke     August 2016       Past Surgical History:   Procedure Laterality Date    APPENDECTOMY      BLADDER SUSPENSION      BREAST LUMPECTOMY      no bp/iv left arm    CARDIAC SURGERY      COLONOSCOPY      CORONARY ANGIOPLASTY WITH STENT PLACEMENT      x 2    Epidural steroid injection      Pain managment    EYE SURGERY      bilat phaco with iol    TONSILLECTOMY      TOTAL ABDOMINAL HYSTERECTOMY         Review of patient's allergies indicates:   Allergen Reactions    No known drug allergies        Social History     Social History    Marital status:      Spouse name: N/A    Number of children: N/A    Years of education: N/A     Occupational History    Not on file.     Social History Main Topics    Smoking status: Never Smoker    Smokeless tobacco: Never Used    Alcohol use No    Drug use: No    Sexual activity: Not on file      Other Topics Concern    Not on file     Social History Narrative    No narrative on file       Current Outpatient Prescriptions on File Prior to Visit   Medication Sig Dispense Refill    ASCORBATE CALCIUM (VITAMIN C ORAL) Take 1 tablet by mouth once daily.       aspirin (ECOTRIN) 81 MG EC tablet Take 1 tablet by mouth once daily.       atorvastatin (LIPITOR) 10 MG tablet TAKE ONE TABLET BY MOUTH EVERY EVENING 30 tablet 4    brimonidine 0.2% (ALPHAGAN) 0.2 % Drop Place 1 drop into both eyes 2 (two) times daily. 10 mL 11    calcium carbonate-vitamin D3 (CALCIUM 600 WITH VITAMIN D3) 600 mg(1,500mg) -500 unit Cap Take 2 tablets by mouth once daily.      clopidogrel (PLAVIX) 75 mg tablet Take 1 tablet (75 mg total) by mouth once daily. 30 tablet 11    dorzolamide (TRUSOPT) 2 % ophthalmic solution INSTILL 1 DROP IN BOTH EYES TWICE DAILY 10 mL 11    ergocalciferol (VITAMIN D2) 50,000 unit Cap Take 50,000 Units by mouth every 7 days. Not sure of dosage//      gabapentin (NEURONTIN) 300 MG capsule 1 tab PO qhs x1 week, then 1 tab PO bid x1 week, then 1 tab PO tid thereafter (Patient taking differently: Take 300 mg by mouth. 1 tab PO qhs x1 week, then 1 tab PO bid x1 week, then 1 tab PO tid thereafter) 90 capsule 2    latanoprost 0.005 % ophthalmic solution PLACE 1 DROP IN BOTH EYES ONCE DAILY AT BEDTIME 7.5 mL 3    levothyroxine (SYNTHROID) 50 MCG tablet TAKE ONE TABLET BY MOUTH BEFORE breakfast 30 tablet 4    lisinopril (PRINIVIL,ZESTRIL) 20 MG tablet Take 1 tablet (20 mg total) by mouth once daily. 30 tablet 5    multivitamin-minerals-lutein Tab Take 1 tablet by mouth once daily.      oxybutynin (DITROPAN) 5 MG Tab Take 1 tablet (5 mg total) by mouth 3 (three) times daily. As needed for bladder spasms. 12 tablet 0    propranolol (INDERAL LA) 120 MG 24 hr capsule TAKE ONE CAPSULE BY MOUTH EVERY DAY 90 capsule 3    rabeprazole (ACIPHEX) 20 mg tablet Take 1 tablet (20 mg total) by mouth every morning. 90  tablet 3    ondansetron (ZOFRAN) 4 MG tablet Take 1 tablet (4 mg total) by mouth every 8 (eight) hours as needed for Nausea. 30 tablet 0     No current facility-administered medications on file prior to visit.        Family History   Problem Relation Age of Onset    Essential Tremor Father     Stroke Father     Heart attack Father     Essential Tremor Sister     Stroke Sister     Essential Tremor Brother     Stroke Brother     Heart attack Brother     Stroke Mother     Heart attack Mother     Stroke Brother     Heart attack Brother     Stroke Brother     Heart attack Brother     Stroke Brother     Heart attack Brother     Stroke Brother     Heart attack Brother     Stroke Sister     Heart disease Sister     Stroke Sister     Heart attack Sister     Stroke Sister     Heart attack Sister     Essential Tremor Daughter     Essential Tremor Son     Anesthesia problems Neg Hx     Clotting disorder Neg Hx     Amblyopia Neg Hx     Blindness Neg Hx     Cancer Neg Hx     Cataracts Neg Hx     Diabetes Neg Hx     Glaucoma Neg Hx     Hypertension Neg Hx     Macular degeneration Neg Hx     Retinal detachment Neg Hx     Strabismus Neg Hx        Review of Systems   Constitutional: Negative for appetite change, chills, fever and unexpected weight change.   HENT: Negative for sore throat and trouble swallowing.    Eyes: Negative for pain and visual disturbance.   Respiratory: Negative for cough, shortness of breath and wheezing.    Cardiovascular: Negative for chest pain and palpitations.   Gastrointestinal: Negative for abdominal pain, blood in stool, diarrhea, nausea and vomiting.   Genitourinary: Negative for difficulty urinating, dysuria and hematuria.   Musculoskeletal: Negative for arthralgias, gait problem and neck pain.   Skin: Negative for rash and wound.   Neurological: Positive for weakness (left arm and leg). Negative for dizziness, numbness and headaches.   Hematological: Negative  for adenopathy.   Psychiatric/Behavioral: Negative for dysphoric mood.       Objective:        Physical Exam   Constitutional: She is oriented to person, place, and time. She appears well-developed and well-nourished.   HENT:   Head: Normocephalic.   Mouth/Throat: Oropharynx is clear and moist. No oropharyngeal exudate or posterior oropharyngeal erythema.   Eyes: Conjunctivae and EOM are normal. Pupils are equal, round, and reactive to light.   Neck: Normal range of motion. Neck supple. No thyromegaly present.   Cardiovascular: Normal rate, regular rhythm, S1 normal, S2 normal, normal heart sounds and intact distal pulses.  Exam reveals no gallop and no friction rub.    No murmur heard.  Pulmonary/Chest: Effort normal and breath sounds normal. She has no wheezes. She has no rales.   Abdominal: Normal appearance.   Musculoskeletal:        Right lower leg: She exhibits no edema.        Left lower leg: She exhibits no edema.   Lymphadenopathy:     She has no cervical adenopathy.   Neurological: She is alert and oriented to person, place, and time. No cranial nerve deficit. Coordination and gait abnormal.   Left arm and leg wekaness   Skin: Skin is warm and intact. No rash noted.   Psychiatric: She has a normal mood and affect.       Assessment:       1. Hemiparesis affecting left side as late effect of cerebrovascular accident    2. DDD (degenerative disc disease), cervical    3. Hypothyroidism due to acquired atrophy of thyroid    4. Essential hypertension    5. Dyslipidemia        Plan:       Hemiparesis affecting left side as late effect of cerebrovascular accident  -     WALKER FOR HOME USE    DDD (degenerative disc disease), cervical  -     tramadol (ULTRAM) 50 mg tablet; Take 1 tablet (50 mg total) by mouth every 6 (six) hours as needed for Pain.  Dispense: 60 tablet; Refill: 5    Hypothyroidism due to acquired atrophy of thyroid    Essential hypertension    Dyslipidemia          Currently stable  Continue  present medications and specialty follow-up

## 2017-05-25 ENCOUNTER — TELEPHONE (OUTPATIENT)
Dept: FAMILY MEDICINE | Facility: CLINIC | Age: 82
End: 2017-05-25

## 2017-05-25 NOTE — TELEPHONE ENCOUNTER
----- Message from Delta Valencia sent at 5/25/2017 12:10 PM CDT -----  Contact: Ashley alfonso/[facility]  Lorri is requesting a PT extension 2x's a week for 3 weeks starting 5-29-17, also wants to know the name of the company sending pt a wheel chair and walker  Call Back#[phone]  Thanks

## 2017-06-08 ENCOUNTER — TELEPHONE (OUTPATIENT)
Dept: FAMILY MEDICINE | Facility: CLINIC | Age: 82
End: 2017-06-08

## 2017-06-08 NOTE — TELEPHONE ENCOUNTER
Spoke to patient, she does not know anything about [facility] and does not want her records faxed.  Advised her that her records will not be faxed.

## 2017-06-08 NOTE — TELEPHONE ENCOUNTER
----- Message from Misty Escobar sent at 6/8/2017  8:48 AM CDT -----  Contact: [facility] called regarding need for the patient recent office visit notes. Please fax to [facility] [phone]

## 2017-06-14 RX ORDER — RABEPRAZOLE SODIUM 20 MG/1
TABLET, DELAYED RELEASE ORAL
Qty: 30 TABLET | Refills: 0 | Status: SHIPPED | OUTPATIENT
Start: 2017-06-14 | End: 2017-07-22 | Stop reason: SDUPTHER

## 2017-06-15 RX ORDER — LATANOPROST 50 UG/ML
SOLUTION/ DROPS OPHTHALMIC
Qty: 5 ML | OUTPATIENT
Start: 2017-06-15

## 2017-06-15 NOTE — TELEPHONE ENCOUNTER
----- Message from Jose COREA Frisard sent at 6/15/2017  2:32 PM CDT -----  Contact: Claudia/[facility]  Claudia called in regarding the attached patient.  Claudia stated that patient is having some burning when she urinates. Claudia stated that patient old her it has been going on for the last 2 days.  Claudia stated she did leave a specimen cup at patients home in case a sample is needed.  Claudia also stated patient does get frequent UTI's..    Claudia wanted to see if something could be called in for patient.  Patient does have scheduled appt next Friday 6/23/17.      [address] [address]  Phone: [phone] Fax: [phone]    Claudia's call back number is [phone]

## 2017-06-16 ENCOUNTER — LAB VISIT (OUTPATIENT)
Dept: LAB | Facility: HOSPITAL | Age: 82
End: 2017-06-16
Attending: FAMILY MEDICINE
Payer: MEDICARE

## 2017-06-16 DIAGNOSIS — N39.0 URINARY TRACT INFECTION, SITE NOT SPECIFIED: Primary | ICD-10-CM

## 2017-06-16 LAB
BILIRUB UR QL STRIP: NEGATIVE
CLARITY UR: CLEAR
COLOR UR: YELLOW
GLUCOSE UR QL STRIP: NEGATIVE
HGB UR QL STRIP: NEGATIVE
KETONES UR QL STRIP: NEGATIVE
LEUKOCYTE ESTERASE UR QL STRIP: NEGATIVE
NITRITE UR QL STRIP: NEGATIVE
PH UR STRIP: 6 [PH] (ref 5–8)
PROT UR QL STRIP: NEGATIVE
SP GR UR STRIP: 1.01 (ref 1–1.03)
URN SPEC COLLECT METH UR: NORMAL

## 2017-06-16 PROCEDURE — 87086 URINE CULTURE/COLONY COUNT: CPT

## 2017-06-16 PROCEDURE — 81003 URINALYSIS AUTO W/O SCOPE: CPT | Mod: PO

## 2017-06-17 LAB
BACTERIA UR CULT: NORMAL
BACTERIA UR CULT: NORMAL

## 2017-06-22 ENCOUNTER — TELEPHONE (OUTPATIENT)
Dept: FAMILY MEDICINE | Facility: CLINIC | Age: 82
End: 2017-06-22

## 2017-06-22 NOTE — TELEPHONE ENCOUNTER
----- Message from Lottie Crystal sent at 6/22/2017  1:32 PM CDT -----  Contact: [facility]  Patient need a [employer] Authorization on [employer] please call back at Insurance number [phone], any questions please call pharmacy back at [phone]

## 2017-07-10 ENCOUNTER — TELEPHONE (OUTPATIENT)
Dept: OPHTHALMOLOGY | Facility: CLINIC | Age: 82
End: 2017-07-10

## 2017-07-10 PROBLEM — R55 SYNCOPAL EPISODES: Status: ACTIVE | Noted: 2017-07-10

## 2017-07-10 PROBLEM — D64.9 ANEMIA: Status: ACTIVE | Noted: 2017-07-10

## 2017-07-10 PROBLEM — G45.2 MULTIPLE AND BILATERAL PRECEREBRAL ARTERY SYNDROMES: Status: ACTIVE | Noted: 2017-07-10

## 2017-07-10 PROBLEM — R55 NEAR SYNCOPE: Status: ACTIVE | Noted: 2017-07-10

## 2017-07-10 RX ORDER — DORZOLAMIDE HCL 20 MG/ML
SOLUTION/ DROPS OPHTHALMIC
Qty: 10 ML | Refills: 5 | Status: SHIPPED | OUTPATIENT
Start: 2017-07-10 | End: 2018-04-23 | Stop reason: SDUPTHER

## 2017-07-13 ENCOUNTER — TELEPHONE (OUTPATIENT)
Dept: FAMILY MEDICINE | Facility: CLINIC | Age: 82
End: 2017-07-13

## 2017-07-13 NOTE — TELEPHONE ENCOUNTER
----- Message from Miranda Fairchild sent at 7/13/2017  9:50 AM CDT -----  Contact: pt [phone]  Patient called and asked for a call back she just got of the hospital the other day she has a question about a prescription that is supposed to be lowered. Please  Call back for details.

## 2017-07-13 NOTE — TELEPHONE ENCOUNTER
States hospitalist changed her propanolol from 160 to 80 mg daily, and she is asking for new rx to be sent.  Advised her per chart review, Dr Hanley sent new rx to [facility] pharmacy yesterday at 9:25am.  She will check with pharmacy and call back as needed.    Hospital follow up appointment scheduled on 7/26/17.

## 2017-07-14 ENCOUNTER — TELEPHONE (OUTPATIENT)
Dept: FAMILY MEDICINE | Facility: CLINIC | Age: 82
End: 2017-07-14

## 2017-07-14 NOTE — TELEPHONE ENCOUNTER
----- Message from Misty Escobar sent at 7/14/2017 11:07 AM CDT -----  Contact: [facility] called regarding the patient's order for wheelchair. Stating it was fitted for the wheelchair and the company, [facility], needed notes on the patient. Submitted faxes on 6/13 and 6/25.  Wanted to get wheel chair processed for patient and no response. Please contact Lorri [phone].

## 2017-07-14 NOTE — TELEPHONE ENCOUNTER
Lorri, home health nurse, states that patient has been waiting for several weeks on wheelchair.  She called [employer] for status and was told notes were needed from our office.  Advised Lorri no requests have been received, will call [employer].    Call to [facility] [phone], Spoke to Alana, states she will have tech call back.

## 2017-07-17 ENCOUNTER — TELEPHONE (OUTPATIENT)
Dept: FAMILY MEDICINE | Facility: CLINIC | Age: 82
End: 2017-07-17

## 2017-07-17 NOTE — TELEPHONE ENCOUNTER
----- Message from Mirnada Fairchild sent at 7/17/2017 11:10 AM CDT -----  Contact: [employer]    Call placed to pod [employer] called back and asked If you will refax the Chart notes to [phone]

## 2017-07-18 ENCOUNTER — TELEPHONE (OUTPATIENT)
Dept: FAMILY MEDICINE | Facility: CLINIC | Age: 82
End: 2017-07-18

## 2017-07-18 DIAGNOSIS — G25.0 ESSENTIAL TREMOR: ICD-10-CM

## 2017-07-20 ENCOUNTER — TELEPHONE (OUTPATIENT)
Dept: FAMILY MEDICINE | Facility: CLINIC | Age: 82
End: 2017-07-20

## 2017-07-20 NOTE — TELEPHONE ENCOUNTER
[employer] to have Regine call back.  Per Gisselle Chiu at their corporate office noted that patient will need a face to face appt with Dr Sheppard.  Advised [employer] that no one has contacted us or patient to advise of this.

## 2017-07-20 NOTE — TELEPHONE ENCOUNTER
----- Message from Elvis Wilder sent at 7/20/2017 12:42 PM CDT -----  Contact: Patient  Patient called regarding order for wheel chair. Please call back at [phone]. Thanks,

## 2017-07-20 NOTE — TELEPHONE ENCOUNTER
Spoke to [employer].  States patient has been fitted for a custom manual wheelchair, needs face to face in order for medicare to pay for it.  [employer] will fax forms/guide prior to pt's appointment on Wednesday 7/26/17.

## 2017-07-20 NOTE — TELEPHONE ENCOUNTER
Per patient, states she has an appointment next Wednesday with Dr Sheppard.  Will do her face to face at that time.  Pt also reports that her home health nurse Suhas, he initiated the order for custom chair.  Will coordinate details when Angelia from [facility] returns call.

## 2017-07-24 RX ORDER — GABAPENTIN 300 MG/1
CAPSULE ORAL
Qty: 90 CAPSULE | Refills: 3 | Status: SHIPPED | OUTPATIENT
Start: 2017-07-24 | End: 2017-11-18 | Stop reason: SDUPTHER

## 2017-07-24 RX ORDER — RABEPRAZOLE SODIUM 20 MG/1
TABLET, DELAYED RELEASE ORAL
Qty: 30 TABLET | Refills: 4 | Status: SHIPPED | OUTPATIENT
Start: 2017-07-24 | End: 2017-12-15 | Stop reason: SDUPTHER

## 2017-07-26 ENCOUNTER — OFFICE VISIT (OUTPATIENT)
Dept: FAMILY MEDICINE | Facility: CLINIC | Age: 82
End: 2017-07-26
Payer: MEDICARE

## 2017-07-26 VITALS
TEMPERATURE: 98 F | RESPIRATION RATE: 18 BRPM | SYSTOLIC BLOOD PRESSURE: 130 MMHG | WEIGHT: 103.38 LBS | DIASTOLIC BLOOD PRESSURE: 60 MMHG | HEART RATE: 56 BPM | BODY MASS INDEX: 19.02 KG/M2 | HEIGHT: 62 IN

## 2017-07-26 DIAGNOSIS — E03.4 HYPOTHYROIDISM DUE TO ACQUIRED ATROPHY OF THYROID: ICD-10-CM

## 2017-07-26 DIAGNOSIS — M50.30 DDD (DEGENERATIVE DISC DISEASE), CERVICAL: ICD-10-CM

## 2017-07-26 DIAGNOSIS — E78.5 DYSLIPIDEMIA: ICD-10-CM

## 2017-07-26 DIAGNOSIS — I25.10 CORONARY ARTERY DISEASE INVOLVING NATIVE CORONARY ARTERY OF NATIVE HEART WITHOUT ANGINA PECTORIS: ICD-10-CM

## 2017-07-26 DIAGNOSIS — G25.0 ESSENTIAL TREMOR: ICD-10-CM

## 2017-07-26 DIAGNOSIS — I69.354 HEMIPARESIS AFFECTING LEFT SIDE AS LATE EFFECT OF CEREBROVASCULAR ACCIDENT: ICD-10-CM

## 2017-07-26 DIAGNOSIS — Z74.09 MOBILITY IMPAIRED: ICD-10-CM

## 2017-07-26 DIAGNOSIS — I10 ESSENTIAL HYPERTENSION: Primary | ICD-10-CM

## 2017-07-26 PROCEDURE — 99999 PR PBB SHADOW E&M-EST. PATIENT-LVL III: CPT | Mod: PBBFAC,,, | Performed by: FAMILY MEDICINE

## 2017-07-26 PROCEDURE — 99214 OFFICE O/P EST MOD 30 MIN: CPT | Mod: S$PBB,,, | Performed by: FAMILY MEDICINE

## 2017-07-26 PROCEDURE — 99213 OFFICE O/P EST LOW 20 MIN: CPT | Mod: PBBFAC,PO | Performed by: FAMILY MEDICINE

## 2017-07-26 PROCEDURE — 1125F AMNT PAIN NOTED PAIN PRSNT: CPT | Mod: ,,, | Performed by: FAMILY MEDICINE

## 2017-07-26 PROCEDURE — 1159F MED LIST DOCD IN RCRD: CPT | Mod: ,,, | Performed by: FAMILY MEDICINE

## 2017-07-26 RX ORDER — TRAMADOL HYDROCHLORIDE 50 MG/1
100 TABLET ORAL EVERY 8 HOURS PRN
Qty: 180 TABLET | Refills: 5 | Status: SHIPPED | OUTPATIENT
Start: 2017-07-26 | End: 2018-02-09 | Stop reason: SDUPTHER

## 2017-07-26 RX ORDER — LISINOPRIL 20 MG/1
10 TABLET ORAL DAILY
Qty: 15 TABLET | Refills: 5 | Status: ON HOLD | OUTPATIENT
Start: 2017-07-26 | End: 2018-01-02

## 2017-07-26 NOTE — PROGRESS NOTES
[facility] Visit    Subjective:       Patient ID: Martha Peres is a 84 y.o. female.    Here to f/u on chronic health issues.  She has been having intermittent episodes of presyncope. Episodes last for minutes and can be relieved with lying down. 2 episodes this week.  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 On arrival to ER vital signs are stable, blood pressures 1 68 /82 afebrile. Lab workup otherwise unremarkable ultrasound of the leg negative for DVT-Per patients son  has had multiple episodes of syncope over the last couple months-patient becomes unresponsive, limp with no recordable blood pressure or heart rate-with no seizure-like activities-few minutes later patient is back to her baseline, states that she feels lightheaded and dizzy but has no complaints of chest pains with episode and is not related with change of position. Patient has been on Ultram 50 mg every 6 hours started early this year for chronic leg pain. Has had no other systemic complaints. Being admitted as observation for syncopal episodes -possibly cardiac etiology   Hospital Course:   After initial stabilization in ER, [facility] was called to admit for farther evaluation and treatment.   The patient was placed in observation to telemetry floor with a consultation for Cardiology. Patient underwent an ECHO which remained stable from previous examination with documented diastolic dysfunction. Patient had recent negative cardiac evaluation for ischemia so no farther cardiac w/u was pursued with current hospitalization. Carotid US did not document significant stenosis to  require surgical intervention. Patient had repeat negative orthostatics for hypotension and remained asymptomatic throughout her hospital stay. The patient was ultimately transitioned to home in stable conditions with close outpatient follow up.     Propranolol was reduced to 80mg in hospital due to orthostasis.  Still had 2 episode of presyncope since home    She has a 24 hour sitter.  Using wheelchair with assistance for ambulation  Son lives next door and periodically helps..  Getting [facility] with PT/OT.  She is no longer able to use a walker and has been determined to benefit from a custom manual wheelchair.  Using tramadol 1 every 6hours.    This is also a mobility evaluation.  Her previous stroke and left hemiparesis significantly impairs her ability to participate in most MRADLs in customary locations in the home.  Her mobility limitation cannot be sufficiently and safely resolved by using a cane or walker.  She does not meet criteria for a POV.  She has the mental and physical capabilitis to function in a MWC,  Using a MWC will significantly improve her ability to paticipate in MRADLs and the patient will use the MWC in the home.  Her main mobility problem is left hemiparesis from her previous stroke. This loss of strength and movement in her left arm and leg majorly impair her mobility in all ADLs.  She does not have the arm of leg strength to use a cane or walker.  She has the physical and mental abilities to use the custom wheelchair in the home.  The PT evaluation has been reviewed and I agree with the PT findings.        Past Medical History:   Diagnosis Date    Anticoagulant long-term use     Arthritis     Benign essential tremor 2000    Bilateral    Breast cancer     left , no B/p or sticks to left    Cataract     ou done    Colon polyps     Coronary artery disease     2 stents    DDD (degenerative disc disease), lumbar     Encounter for blood transfusion     GERD (gastroesophageal reflux  disease)     Glaucoma     left eye    Hemiparesis affecting left side as late effect of cerebrovascular accident     HLD (hyperlipidemia)     HTN (hypertension)     Hyperparathyroidism     Hypothyroidism     Mobility impaired     uses wheelchair    Neck pain     radiating to both shoulders and arms    Osteoporosis     PVD (peripheral vascular disease)     Stroke     August 2016       Past Surgical History:   Procedure Laterality Date    APPENDECTOMY      BLADDER SUSPENSION      BREAST LUMPECTOMY      no bp/iv left arm    CARDIAC SURGERY      COLONOSCOPY      CORONARY ANGIOPLASTY WITH STENT PLACEMENT      x 2    Epidural steroid injection      Pain managment    EYE SURGERY      bilat phaco with iol    TONSILLECTOMY      TOTAL ABDOMINAL HYSTERECTOMY         Review of patient's allergies indicates:   Allergen Reactions    No known drug allergies        Social History     Social History    Marital status:      Spouse name: N/A    Number of children: N/A    Years of education: N/A     Occupational History    Not on file.     Social History Main Topics    Smoking status: Never Smoker    Smokeless tobacco: Never Used    Alcohol use No    Drug use: No    Sexual activity: Not on file     Other Topics Concern    Not on file     Social History Narrative    No narrative on file       Current Outpatient Prescriptions on File Prior to Visit   Medication Sig Dispense Refill    ASCORBATE CALCIUM (VITAMIN C ORAL) Take 1 tablet by mouth once daily.       aspirin (ECOTRIN) 81 MG EC tablet Take 1 tablet by mouth once daily.       atorvastatin (LIPITOR) 10 MG tablet TAKE ONE TABLET BY MOUTH EVERY EVENING 30 tablet 4    brimonidine 0.2% (ALPHAGAN) 0.2 % Drop Place 1 drop into both eyes 2 (two) times daily. 10 mL 11    calcium carbonate-vitamin D3 (CALCIUM 600 WITH VITAMIN D3) 600 mg(1,500mg) -500 unit Cap Take 2 tablets by mouth once daily.      clopidogrel (PLAVIX) 75 mg tablet Take 1 tablet  (75 mg total) by mouth once daily. 30 tablet 11    dorzolamide (TRUSOPT) 2 % ophthalmic solution instill ONE drop IN EACH EYE TWICE DAILY 10 mL 5    ergocalciferol (VITAMIN D2) 50,000 unit Cap Take 50,000 Units by mouth every 7 days. Not sure of dosage//      gabapentin (NEURONTIN) 300 MG capsule TAKE 1 CAPSULE BY MOUTH AT BEDTIME FOR 1 WEEK, THEN TAKE 1 CAPSULE TWICE DAILY FOR 1 WEEK, THEN TAKE 1 CAPSULE THREE TIMES DAILY thereafter 90 capsule 3    latanoprost 0.005 % ophthalmic solution PLACE 1 DROP IN BOTH EYES ONCE DAILY AT BEDTIME 7.5 mL 3    levothyroxine (SYNTHROID) 50 MCG tablet TAKE ONE TABLET BY MOUTH BEFORE breakfast 30 tablet 4    multivitamin-minerals-lutein Tab Take 1 tablet by mouth once daily.      propranolol (INNOPRAN XL) 80 mg 24 hr capsule Take 1 capsule (80 mg total) by mouth every evening. 30 capsule 11    rabeprazole (ACIPHEX) 20 mg tablet TAKE ONE TABLET BY MOUTH EVERY MORNING 30 tablet 4    ondansetron (ZOFRAN) 4 MG tablet Take 1 tablet (4 mg total) by mouth every 8 (eight) hours as needed for Nausea. 30 tablet 0     No current facility-administered medications on file prior to visit.        Family History   Problem Relation Age of Onset    Essential Tremor Father     Stroke Father     Heart attack Father     Essential Tremor Sister     Stroke Sister     Essential Tremor Brother     Stroke Brother     Heart attack Brother     Stroke Mother     Heart attack Mother     Stroke Brother     Heart attack Brother     Stroke Brother     Heart attack Brother     Stroke Brother     Heart attack Brother     Stroke Brother     Heart attack Brother     Stroke Sister     Heart disease Sister     Stroke Sister     Heart attack Sister     Stroke Sister     Heart attack Sister     Essential Tremor Daughter     Essential Tremor Son     Anesthesia problems Neg Hx     Clotting disorder Neg Hx     Amblyopia Neg Hx     Blindness Neg Hx     Cancer Neg Hx     Cataracts Neg  "Hx     Diabetes Neg Hx     Glaucoma Neg Hx     Hypertension Neg Hx     Macular degeneration Neg Hx     Retinal detachment Neg Hx     Strabismus Neg Hx        Review of Systems   Constitutional: Negative for appetite change, chills, fever and unexpected weight change.   HENT: Negative for sore throat and trouble swallowing.    Eyes: Negative for pain and visual disturbance.   Respiratory: Negative for cough, shortness of breath and wheezing.    Cardiovascular: Negative for chest pain and palpitations.   Gastrointestinal: Negative for abdominal pain, blood in stool, diarrhea, nausea and vomiting.   Genitourinary: Negative for difficulty urinating, dysuria and hematuria.   Musculoskeletal: Negative for arthralgias, gait problem and neck pain.   Skin: Negative for rash and wound.   Neurological: Positive for weakness (left arm and leg). Negative for dizziness, numbness and headaches.   Hematological: Negative for adenopathy.   Psychiatric/Behavioral: Negative for dysphoric mood.       Objective:      /60 (BP Location: Left arm, Patient Position: Sitting, BP Method: Manual)   Pulse (!) 56   Temp 97.8 °F (36.6 °C) (Oral)   Resp 18   Ht 5' 2" (1.575 m)   Wt 46.9 kg (103 lb 6.3 oz)   BMI 18.91 kg/m²     Physical Exam   Constitutional: She is oriented to person, place, and time. She appears well-developed and well-nourished.   HENT:   Head: Normocephalic.   Mouth/Throat: Oropharynx is clear and moist. No oropharyngeal exudate or posterior oropharyngeal erythema.   Eyes: Conjunctivae and EOM are normal. Pupils are equal, round, and reactive to light.   Neck: Normal range of motion. Neck supple. No thyromegaly present.   Cardiovascular: Normal rate, regular rhythm, S1 normal, S2 normal, normal heart sounds and intact distal pulses.  Exam reveals no gallop and no friction rub.    No murmur heard.  Pulmonary/Chest: Effort normal and breath sounds normal. She has no wheezes. She has no rales.   Abdominal: Normal " appearance.   Musculoskeletal:        Right lower leg: She exhibits no edema.        Left lower leg: She exhibits no edema.   Lymphadenopathy:     She has no cervical adenopathy.   Neurological: She is alert and oriented to person, place, and time. She displays tremor. No cranial nerve deficit. Coordination and gait abnormal.   Left arm and leg wekaness   Skin: Skin is warm and intact. No rash noted.   Psychiatric: She has a normal mood and affect.       Hospital records reviewed    Assessment:       1. Essential hypertension    2. DDD (degenerative disc disease), cervical    3. Coronary artery disease involving native coronary artery of native heart without angina pectoris    4. Dyslipidemia    5. Essential tremor    6. Hemiparesis affecting left side as late effect of cerebrovascular accident    7. Hypothyroidism due to acquired atrophy of thyroid    8. Mobility impaired        Plan:       Essential hypertension  -     lisinopril (PRINIVIL,ZESTRIL) 20 MG tablet; Take 0.5 tablets (10 mg total) by mouth once daily.  Dispense: 15 tablet; Refill: 5    DDD (degenerative disc disease), cervical  -     tramadol (ULTRAM) 50 mg tablet; Take 2 tablets (100 mg total) by mouth every 8 (eight) hours as needed for Pain.  Dispense: 180 tablet; Refill: 5    Coronary artery disease involving native coronary artery of native heart without angina pectoris    Dyslipidemia    Essential tremor    Hemiparesis affecting left side as late effect of cerebrovascular accident    Hypothyroidism due to acquired atrophy of thyroid    Mobility impaired        Will fax orders for custom wheelchair which she needs to perform her ADLs  Reduce lisinopril to 10mg daily due to hypotension  Increase tramadol to 100mg every 8 hours  Continue other present medications and specialty follow-up  F/u 1 month to recheck BP and pain control

## 2017-07-27 ENCOUNTER — TELEPHONE (OUTPATIENT)
Dept: FAMILY MEDICINE | Facility: CLINIC | Age: 82
End: 2017-07-27

## 2017-07-27 ENCOUNTER — NURSE TRIAGE (OUTPATIENT)
Dept: ADMINISTRATIVE | Facility: CLINIC | Age: 82
End: 2017-07-27

## 2017-07-27 NOTE — TELEPHONE ENCOUNTER
----- Message from Cassia Jacinto sent at 7/27/2017  8:37 AM CDT -----  Call given to on call nurse / pressure 80/50 [facility]

## 2017-07-27 NOTE — TELEPHONE ENCOUNTER
Per Dr Sheppard, pt to lay flat with feet elevated, drink fluids, continue to monitor BP.  Patient advised, voices understanding.  I then asked to speak to caregiver.      Per caregiver Emmie, BP is now 100/50, pt took am meds which included  lisinopril 10mg, tramadol 100mg, propranolol 80mg, she also took tramadol 100mg and gabapentin 300mg last night at bedtime.  Emmie suspects it may be the increase in tramadol last night and this morning, prior dose was 50mg tid, increased to 100mg at yesterday's visit.  Emmie's callback number is [phone].  Advised Emmie to monitor BP, keep feet elevated and encourage fluids, will call back if further instruction given by Dr Sheppard.  Emmie to call back as needed.

## 2017-07-27 NOTE — TELEPHONE ENCOUNTER
Discontinue lisinopril entirely.  Reduce back to tramadol 50mg PRN pain.  Risk of increasing dose seems to outweight the benefit of better pain control.  No other new meds advised at this time.

## 2017-07-27 NOTE — TELEPHONE ENCOUNTER
Reason for Disposition   Systolic BP < 90 and feeling dizzy, lightheaded, or weak    Protocols used: ST LOW BLOOD PRESSURE-A-OH  Spoke to Candy PT assistant with [facility]. She states Ms. Peres was feeling weak and looked pale. Her initial blood pressure was 80/50 with a pulse of 65. Candy states after fluids patient's blood pressure 88/56. Advised caller to call 911 or send patient to the ED per triage protocol. Caller is requesting further instructions from Dr. Sheppard. She states these reoccurring episodes and  PCP is aware.

## 2017-08-03 ENCOUNTER — TELEPHONE (OUTPATIENT)
Dept: FAMILY MEDICINE | Facility: CLINIC | Age: 82
End: 2017-08-03

## 2017-08-03 NOTE — TELEPHONE ENCOUNTER
----- Message from Cristy Bacon sent at 8/3/2017 10:21 AM CDT -----  Patient requesting to speak with nurse (only information given)please call back at [phone].

## 2017-08-16 ENCOUNTER — TELEPHONE (OUTPATIENT)
Dept: FAMILY MEDICINE | Facility: CLINIC | Age: 82
End: 2017-08-16

## 2017-08-16 NOTE — TELEPHONE ENCOUNTER
----- Message from Maria Esther Pa sent at 8/16/2017  9:43 AM CDT -----  [facility] [phone] is calling concerning to see if she could get an order for a Ua. She is having burning during urination. Please call to advise. She is with the patient right now and would like to do it now. I placed call to pod.

## 2017-08-21 RX ORDER — ATORVASTATIN CALCIUM 10 MG/1
TABLET, FILM COATED ORAL
Qty: 30 TABLET | Refills: 0 | Status: SHIPPED | OUTPATIENT
Start: 2017-08-21 | End: 2017-09-16 | Stop reason: SDUPTHER

## 2017-08-23 ENCOUNTER — TELEPHONE (OUTPATIENT)
Dept: FAMILY MEDICINE | Facility: CLINIC | Age: 82
End: 2017-08-23

## 2017-08-23 DIAGNOSIS — R30.0 DYSURIA: Primary | ICD-10-CM

## 2017-08-23 NOTE — TELEPHONE ENCOUNTER
Spoke to Gisselle with [facility], states that all paperwork has been submitted to medicare, decision is pending, can take up to 30 day for decision.

## 2017-08-23 NOTE — TELEPHONE ENCOUNTER
Patient advised of wheelchair status, voices understanding.  Will follow up in 2 weeks if no updated received prior to then.

## 2017-08-23 NOTE — TELEPHONE ENCOUNTER
----- Message from Maria Esther Pa sent at 8/23/2017 10:45 AM CDT -----  [facility] [phone] is calling concerning patient's urinalysis that was ordered from last week. Please call back for details.

## 2017-08-23 NOTE — TELEPHONE ENCOUNTER
Unable to obtain urine last week, patient with same symptom of burning on urination, will obtain specimen at home visit tomorrow am, orders pended.    Also asking about status on wheelchair, call placed to [facility].

## 2017-08-24 ENCOUNTER — LAB VISIT (OUTPATIENT)
Dept: LAB | Facility: HOSPITAL | Age: 82
End: 2017-08-24
Attending: FAMILY MEDICINE
Payer: MEDICARE

## 2017-08-24 DIAGNOSIS — N39.0 URINARY TRACT INFECTION, SITE NOT SPECIFIED: Primary | ICD-10-CM

## 2017-08-24 LAB
BILIRUB UR QL STRIP: NEGATIVE
CAOX CRY URNS QL MICRO: ABNORMAL
CLARITY UR: CLEAR
COLOR UR: YELLOW
GLUCOSE UR QL STRIP: NEGATIVE
HGB UR QL STRIP: NEGATIVE
KETONES UR QL STRIP: NEGATIVE
LEUKOCYTE ESTERASE UR QL STRIP: ABNORMAL
MICROSCOPIC COMMENT: ABNORMAL
NITRITE UR QL STRIP: NEGATIVE
PH UR STRIP: 7 [PH] (ref 5–8)
PROT UR QL STRIP: NEGATIVE
SP GR UR STRIP: 1.02 (ref 1–1.03)
SQUAMOUS #/AREA URNS HPF: 2 /HPF
URN SPEC COLLECT METH UR: ABNORMAL
WBC #/AREA URNS HPF: 10 /HPF (ref 0–5)

## 2017-08-24 PROCEDURE — 81000 URINALYSIS NONAUTO W/SCOPE: CPT | Mod: PO

## 2017-08-26 ENCOUNTER — TELEPHONE (OUTPATIENT)
Dept: FAMILY MEDICINE | Facility: CLINIC | Age: 82
End: 2017-08-26

## 2017-08-26 DIAGNOSIS — E03.4 IDIOPATHIC ATROPHIC HYPOTHYROIDISM: Primary | ICD-10-CM

## 2017-08-28 RX ORDER — LEVOTHYROXINE SODIUM 50 UG/1
TABLET ORAL
Qty: 30 TABLET | Refills: 0 | Status: SHIPPED | OUTPATIENT
Start: 2017-08-28 | End: 2017-09-29 | Stop reason: SDUPTHER

## 2017-08-28 NOTE — TELEPHONE ENCOUNTER
She is seeing Valarie tomorrow morning, can we draw then?  If so, TSH pended for review, please add as needed.

## 2017-08-28 NOTE — TELEPHONE ENCOUNTER
Need thyroid level on her - she has been ill - but I see [facility] sees her - can they draw that  I refilled thyroid med

## 2017-08-29 ENCOUNTER — OFFICE VISIT (OUTPATIENT)
Dept: FAMILY MEDICINE | Facility: CLINIC | Age: 82
End: 2017-08-29
Payer: MEDICARE

## 2017-08-29 VITALS
HEART RATE: 56 BPM | WEIGHT: 104.25 LBS | BODY MASS INDEX: 19.19 KG/M2 | OXYGEN SATURATION: 99 % | SYSTOLIC BLOOD PRESSURE: 146 MMHG | RESPIRATION RATE: 18 BRPM | DIASTOLIC BLOOD PRESSURE: 60 MMHG | TEMPERATURE: 98 F | HEIGHT: 62 IN

## 2017-08-29 DIAGNOSIS — I69.354 HEMIPARESIS AFFECTING LEFT SIDE AS LATE EFFECT OF CEREBROVASCULAR ACCIDENT: ICD-10-CM

## 2017-08-29 DIAGNOSIS — G25.0 ESSENTIAL TREMOR: ICD-10-CM

## 2017-08-29 DIAGNOSIS — E03.4 HYPOTHYROIDISM DUE TO ACQUIRED ATROPHY OF THYROID: ICD-10-CM

## 2017-08-29 DIAGNOSIS — Z74.09 MOBILITY IMPAIRED: ICD-10-CM

## 2017-08-29 DIAGNOSIS — I10 ESSENTIAL HYPERTENSION: Primary | ICD-10-CM

## 2017-08-29 PROBLEM — R55 SYNCOPAL EPISODES: Status: RESOLVED | Noted: 2017-07-10 | Resolved: 2017-08-29

## 2017-08-29 PROCEDURE — 1126F AMNT PAIN NOTED NONE PRSNT: CPT | Mod: ,,, | Performed by: FAMILY MEDICINE

## 2017-08-29 PROCEDURE — 3077F SYST BP >= 140 MM HG: CPT | Mod: ,,, | Performed by: FAMILY MEDICINE

## 2017-08-29 PROCEDURE — 99213 OFFICE O/P EST LOW 20 MIN: CPT | Mod: PBBFAC,PO | Performed by: FAMILY MEDICINE

## 2017-08-29 PROCEDURE — 1159F MED LIST DOCD IN RCRD: CPT | Mod: ,,, | Performed by: FAMILY MEDICINE

## 2017-08-29 PROCEDURE — 99999 PR PBB SHADOW E&M-EST. PATIENT-LVL III: CPT | Mod: PBBFAC,,, | Performed by: FAMILY MEDICINE

## 2017-08-29 PROCEDURE — 3078F DIAST BP <80 MM HG: CPT | Mod: ,,, | Performed by: FAMILY MEDICINE

## 2017-08-29 PROCEDURE — 99214 OFFICE O/P EST MOD 30 MIN: CPT | Mod: S$PBB,,, | Performed by: FAMILY MEDICINE

## 2017-08-29 NOTE — PROGRESS NOTES
[facility] Home Visit    Subjective:       Patient ID: Martha Peres is a 84 y.o. female.    Here to f/u on chronic health issues.     HPI:     Still has not got recommended custom wheelchair.  She has a 24 hour sitter.  Using wheelchair with assistance for ambulation  Son lives next door and periodically helps..  [facility]  nurse coming weekly.  She is no longer able to use a walker and has been determined to benefit from a custom manual wheelchair.  Using tramadol 1 every 6hours.    This is also a mobility evaluation.  Her previous stroke and left hemiparesis significantly impairs her ability to participate in most MRADLs in customary locations in the home.  Her mobility limitation cannot be sufficiently and safely resolved by using a cane or walker.  She does not meet criteria for a POV.  She has the mental and physical capabilitis to function in a MWC,  Using a MWC will significantly improve her ability to paticipate in MRADLs and the patient will use the MWC in the home.  Her main mobility problem is left hemiparesis from her previous stroke. This loss of strength and movement in her left arm and leg majorly impair her mobility in all ADLs.  She does not have the arm of leg strength to use a cane or walker.  She has the physical and mental abilities to use the custom wheelchair in the home.  The PT evaluation has been reviewed and I agree with the PT findings.      Hypertension   Pertinent negatives include no chest pain, headaches, neck pain, palpitations or shortness of breath.       Past Medical History:   Diagnosis Date    Anticoagulant long-term use     Arthritis     Benign essential tremor 2000    Bilateral    Breast cancer     left , no B/p or sticks to left    Cataract     ou done    Colon polyps     Coronary artery disease     2 stents    DDD (degenerative disc disease), lumbar     Encounter for blood transfusion     GERD (gastroesophageal reflux disease)     Glaucoma     left eye     Hemiparesis affecting left side as late effect of cerebrovascular accident     HLD (hyperlipidemia)     HTN (hypertension)     Hyperparathyroidism     Hypothyroidism     Mobility impaired     uses wheelchair    Neck pain     radiating to both shoulders and arms    Osteoporosis     PVD (peripheral vascular disease)     Stroke     August 2016       Past Surgical History:   Procedure Laterality Date    APPENDECTOMY      BLADDER SUSPENSION      BREAST LUMPECTOMY      no bp/iv left arm    CARDIAC SURGERY      COLONOSCOPY      CORONARY ANGIOPLASTY WITH STENT PLACEMENT      x 2    Epidural steroid injection      Pain managment    EYE SURGERY      bilat phaco with iol    TONSILLECTOMY      TOTAL ABDOMINAL HYSTERECTOMY         Review of patient's allergies indicates:   Allergen Reactions    No known drug allergies        Social History     Social History    Marital status:      Spouse name: N/A    Number of children: N/A    Years of education: N/A     Occupational History    Not on file.     Social History Main Topics    Smoking status: Never Smoker    Smokeless tobacco: Never Used    Alcohol use No    Drug use: No    Sexual activity: Not on file     Other Topics Concern    Not on file     Social History Narrative    No narrative on file       Current Outpatient Prescriptions on File Prior to Visit   Medication Sig Dispense Refill    ASCORBATE CALCIUM (VITAMIN C ORAL) Take 1 tablet by mouth once daily.       aspirin (ECOTRIN) 81 MG EC tablet Take 1 tablet by mouth once daily.       atorvastatin (LIPITOR) 10 MG tablet TAKE ONE TABLET BY MOUTH EVERY EVENING 30 tablet 0    brimonidine 0.2% (ALPHAGAN) 0.2 % Drop Place 1 drop into both eyes 2 (two) times daily. 10 mL 11    calcium carbonate-vitamin D3 (CALCIUM 600 WITH VITAMIN D3) 600 mg(1,500mg) -500 unit Cap Take 2 tablets by mouth once daily.      clopidogrel (PLAVIX) 75 mg tablet Take 1 tablet (75 mg total) by mouth once daily.  30 tablet 11    dorzolamide (TRUSOPT) 2 % ophthalmic solution instill ONE drop IN EACH EYE TWICE DAILY 10 mL 5    ergocalciferol (VITAMIN D2) 50,000 unit Cap Take 50,000 Units by mouth every 7 days. Not sure of dosage//      gabapentin (NEURONTIN) 300 MG capsule TAKE 1 CAPSULE BY MOUTH AT BEDTIME FOR 1 WEEK, THEN TAKE 1 CAPSULE TWICE DAILY FOR 1 WEEK, THEN TAKE 1 CAPSULE THREE TIMES DAILY thereafter 90 capsule 3    latanoprost 0.005 % ophthalmic solution PLACE 1 DROP IN BOTH EYES ONCE DAILY AT BEDTIME 7.5 mL 3    levothyroxine (SYNTHROID) 50 MCG tablet TAKE ONE TABLET BY MOUTH BEFORE breakfast 30 tablet 0    lisinopril (PRINIVIL,ZESTRIL) 20 MG tablet Take 0.5 tablets (10 mg total) by mouth once daily. 15 tablet 5    multivitamin-minerals-lutein Tab Take 1 tablet by mouth once daily.      propranolol (INNOPRAN XL) 80 mg 24 hr capsule Take 1 capsule (80 mg total) by mouth every evening. 30 capsule 11    rabeprazole (ACIPHEX) 20 mg tablet TAKE ONE TABLET BY MOUTH EVERY MORNING 30 tablet 4    tramadol (ULTRAM) 50 mg tablet Take 2 tablets (100 mg total) by mouth every 8 (eight) hours as needed for Pain. (Patient taking differently: Take 50 mg by mouth every 8 (eight) hours as needed for Pain. Takes one 50 mg tablet as directed) 180 tablet 5    ondansetron (ZOFRAN) 4 MG tablet Take 1 tablet (4 mg total) by mouth every 8 (eight) hours as needed for Nausea. 30 tablet 0     No current facility-administered medications on file prior to visit.        Family History   Problem Relation Age of Onset    Essential Tremor Father     Stroke Father     Heart attack Father     Essential Tremor Sister     Stroke Sister     Essential Tremor Brother     Stroke Brother     Heart attack Brother     Stroke Mother     Heart attack Mother     Stroke Brother     Heart attack Brother     Stroke Brother     Heart attack Brother     Stroke Brother     Heart attack Brother     Stroke Brother     Heart attack Brother   "   Stroke Sister     Heart disease Sister     Stroke Sister     Heart attack Sister     Stroke Sister     Heart attack Sister     Essential Tremor Daughter     Essential Tremor Son     Anesthesia problems Neg Hx     Clotting disorder Neg Hx     Amblyopia Neg Hx     Blindness Neg Hx     Cancer Neg Hx     Cataracts Neg Hx     Diabetes Neg Hx     Glaucoma Neg Hx     Hypertension Neg Hx     Macular degeneration Neg Hx     Retinal detachment Neg Hx     Strabismus Neg Hx        Review of Systems   Constitutional: Negative for appetite change, chills, fever and unexpected weight change.   HENT: Negative for sore throat and trouble swallowing.    Eyes: Negative for pain and visual disturbance.   Respiratory: Negative for cough, shortness of breath and wheezing.    Cardiovascular: Negative for chest pain and palpitations.   Gastrointestinal: Negative for abdominal pain, blood in stool, diarrhea, nausea and vomiting.   Genitourinary: Negative for difficulty urinating, dysuria and hematuria.   Musculoskeletal: Negative for arthralgias, gait problem and neck pain.   Skin: Negative for rash and wound.   Neurological: Positive for weakness (left arm and leg). Negative for dizziness, numbness and headaches.   Hematological: Negative for adenopathy.   Psychiatric/Behavioral: Negative for dysphoric mood.       Objective:      BP (!) 146/60   Pulse (!) 56   Temp 97.5 °F (36.4 °C) (Oral)   Resp 18   Ht 5' 2" (1.575 m)   Wt 47.3 kg (104 lb 4.4 oz)   SpO2 99%   BMI 19.07 kg/m²     Physical Exam   Constitutional: She is oriented to person, place, and time. She appears well-developed and well-nourished.   HENT:   Head: Normocephalic.   Mouth/Throat: Oropharynx is clear and moist. No oropharyngeal exudate or posterior oropharyngeal erythema.   Eyes: Conjunctivae and EOM are normal. Pupils are equal, round, and reactive to light.   Neck: Normal range of motion. Neck supple. No thyromegaly present. "   Cardiovascular: Normal rate, regular rhythm, S1 normal, S2 normal, normal heart sounds and intact distal pulses.  Exam reveals no gallop and no friction rub.    No murmur heard.  Pulmonary/Chest: Effort normal and breath sounds normal. She has no wheezes. She has no rales.   Abdominal: Normal appearance.   Musculoskeletal:        Right lower leg: She exhibits no edema.        Left lower leg: She exhibits no edema.   Lymphadenopathy:     She has no cervical adenopathy.   Neurological: She is alert and oriented to person, place, and time. She displays tremor. No cranial nerve deficit. Coordination and gait abnormal.   Left arm and leg wekaness   Skin: Skin is warm and intact. No rash noted.   Psychiatric: She has a normal mood and affect.         Assessment:       1. Essential hypertension    2. Hypothyroidism due to acquired atrophy of thyroid    3. Mobility impaired    4. Hemiparesis affecting left side as late effect of cerebrovascular accident    5. Essential tremor        Plan:       Essential hypertension  -     Comprehensive metabolic panel; Future; Expected date: 11/27/2017    Hypothyroidism due to acquired atrophy of thyroid  -     TSH; Future; Expected date: 11/27/2017    Mobility impaired    Hemiparesis affecting left side as late effect of cerebrovascular accident    Essential tremor        Reemphasize that patient needs custom wheelchair which she needs to perform her ADLs  continue lisinopril to 10mg daily  tramadol to 100mg every 8 hours  Continue other present medications and specialty follow-up  F/u 3 months

## 2017-09-13 ENCOUNTER — TELEPHONE (OUTPATIENT)
Dept: FAMILY MEDICINE | Facility: CLINIC | Age: 82
End: 2017-09-13

## 2017-09-13 NOTE — TELEPHONE ENCOUNTER
----- Message from Aziza Silva LPN sent at 8/29/2017  3:18 PM CDT -----  CALL [facility] TO CHECK STATUS OF CUSTOM WHEEL CHAIR [facility]UM[facility] [phone]/ see previous chart notes

## 2017-09-17 RX ORDER — ATORVASTATIN CALCIUM 10 MG/1
TABLET, FILM COATED ORAL
Qty: 30 TABLET | Refills: 6 | Status: SHIPPED | OUTPATIENT
Start: 2017-09-17 | End: 2018-05-07 | Stop reason: SDUPTHER

## 2017-09-28 NOTE — TELEPHONE ENCOUNTER
----- Message from Jose Lewis sent at 9/28/2017  3:10 PM CDT -----  Contact: Shelby/[facility]  Unsuccessful call placed to office.  Shleby called in and requested a call back regarding the attached patient.  Shelby stated that patient has some pitted ademia in left lower extremity.  Shelby's call back number is [phone]

## 2017-10-02 RX ORDER — LEVOTHYROXINE SODIUM 50 UG/1
TABLET ORAL
Qty: 30 TABLET | Refills: 1 | Status: SHIPPED | OUTPATIENT
Start: 2017-10-02 | End: 2017-11-25 | Stop reason: SDUPTHER

## 2017-10-05 ENCOUNTER — TELEPHONE (OUTPATIENT)
Dept: CARDIOLOGY | Facility: CLINIC | Age: 82
End: 2017-10-05

## 2017-10-05 RX ORDER — FUROSEMIDE 20 MG/1
20 TABLET ORAL DAILY
Qty: 30 TABLET | Refills: 2 | Status: ON HOLD | OUTPATIENT
Start: 2017-10-05 | End: 2019-04-15 | Stop reason: SDUPTHER

## 2017-10-05 RX ORDER — FUROSEMIDE 20 MG/1
20 TABLET ORAL DAILY
COMMUNITY
End: 2017-10-05 | Stop reason: SDUPTHER

## 2017-10-05 NOTE — TELEPHONE ENCOUNTER
----- Message from Cassia Jacinto sent at 10/5/2017  1:00 PM CDT -----  Please call Banner with [facility] home [facility] / [phone]  States she called last week with no response

## 2017-10-05 NOTE — TELEPHONE ENCOUNTER
Spoke with the patient she was advised of the medication lasix called in for her by Penelope ERAZO as well as her follow up appointment scheduled for 10/12. She will call back if she can't make this appointment due to transportation.

## 2017-10-05 NOTE — TELEPHONE ENCOUNTER
Patient due for office visit. Scheduled for next thurs at 1pm- spoke with Melany MUNGUIA unable to reach patient no voicemail available- adivised lasix was sent to pharmacy she will also try and inform patient.

## 2017-10-05 NOTE — TELEPHONE ENCOUNTER
----- Message from Cassia Jacinto sent at 10/5/2017  2:51 PM CDT -----  Please call pt at [phone]  Returning call

## 2017-10-05 NOTE — TELEPHONE ENCOUNTER
----- Message from Cassia Jacinto sent at 10/5/2017  2:51 PM CDT -----  Please call pt at [phone]  Returning call

## 2017-10-09 ENCOUNTER — TELEPHONE (OUTPATIENT)
Dept: CARDIOLOGY | Facility: CLINIC | Age: 82
End: 2017-10-09

## 2017-10-09 ENCOUNTER — TELEPHONE (OUTPATIENT)
Dept: FAMILY MEDICINE | Facility: CLINIC | Age: 82
End: 2017-10-09

## 2017-10-09 DIAGNOSIS — I69.354 HEMIPARESIS AFFECTING LEFT SIDE AS LATE EFFECT OF CEREBROVASCULAR ACCIDENT: Primary | ICD-10-CM

## 2017-10-09 NOTE — TELEPHONE ENCOUNTER
----- Message from Miranda Fairchild sent at 10/9/2017  3:29 PM CDT -----  Contact: pt [phone]  Patient called  And asked for a call back about the prescription that was called in also she has a question about her up coming appointment.

## 2017-10-09 NOTE — TELEPHONE ENCOUNTER
Order for PT for patient. Muscle weakness and getting aquatinted to new wheel chair. Please put in order if possible. Thanks

## 2017-10-09 NOTE — TELEPHONE ENCOUNTER
----- Message from Cassia Jacinto sent at 10/9/2017 12:22 PM CDT -----  Shelby / [facility]  / [phone] ... Asking for physical therapy orders

## 2017-10-16 ENCOUNTER — TELEPHONE (OUTPATIENT)
Dept: OPHTHALMOLOGY | Facility: CLINIC | Age: 82
End: 2017-10-16

## 2017-10-16 RX ORDER — BRIMONIDINE TARTRATE 2 MG/ML
SOLUTION/ DROPS OPHTHALMIC
Qty: 10 ML | Refills: 1 | Status: SHIPPED | OUTPATIENT
Start: 2017-10-16 | End: 2018-02-26 | Stop reason: SDUPTHER

## 2017-10-23 DIAGNOSIS — I63.511 CEREBRAL INFARCTION INVOLVING RIGHT MIDDLE CEREBRAL ARTERY: ICD-10-CM

## 2017-10-23 RX ORDER — CLOPIDOGREL BISULFATE 75 MG/1
TABLET ORAL
Qty: 30 TABLET | Refills: 11 | Status: SHIPPED | OUTPATIENT
Start: 2017-10-23 | End: 2018-11-02 | Stop reason: SDUPTHER

## 2017-11-01 ENCOUNTER — TELEPHONE (OUTPATIENT)
Dept: FAMILY MEDICINE | Facility: CLINIC | Age: 82
End: 2017-11-01

## 2017-11-01 NOTE — TELEPHONE ENCOUNTER
----- Message from Emilia Hsu sent at 11/1/2017  3:00 PM CDT -----  Contact: roger VELASQUEZ   Cough runny nose   Call back     Lungs clear  Please advise

## 2017-11-01 NOTE — TELEPHONE ENCOUNTER
"Spoke with Shelby at [facility] and she stated that she is with the patient. Patient has a clear runny nose, dry cough, and sounds "nasally". Stated that lungs are clear, no fever, and vitals are good. This started a week ago. Please advise on what patient needs to do and we will notify Shelby.   "

## 2017-11-03 NOTE — TELEPHONE ENCOUNTER
Ok for her to try OTC Claritin 10mg daily and Flonase 1 spray per nostril at bedtime for her likely allergy symptoms.

## 2017-11-03 NOTE — TELEPHONE ENCOUNTER
----- Message from Cristy Nolan sent at 11/3/2017  9:35 AM CDT -----  Contact: Priti alfonso/[facility]  Priti states she spoke to nurse a few days ago but has not received a call back contact her at [phone] to follow up.    Thank you

## 2017-11-08 ENCOUNTER — DOCUMENTATION ONLY (OUTPATIENT)
Dept: FAMILY MEDICINE | Facility: CLINIC | Age: 82
End: 2017-11-08

## 2017-11-18 DIAGNOSIS — G25.0 ESSENTIAL TREMOR: ICD-10-CM

## 2017-11-18 RX ORDER — GABAPENTIN 300 MG/1
CAPSULE ORAL
Qty: 90 CAPSULE | Refills: 0 | Status: SHIPPED | OUTPATIENT
Start: 2017-11-18 | End: 2017-12-26 | Stop reason: SDUPTHER

## 2017-11-25 ENCOUNTER — TELEPHONE (OUTPATIENT)
Dept: FAMILY MEDICINE | Facility: CLINIC | Age: 82
End: 2017-11-25

## 2017-11-27 RX ORDER — LEVOTHYROXINE SODIUM 50 UG/1
TABLET ORAL
Qty: 30 TABLET | Refills: 0 | Status: SHIPPED | OUTPATIENT
Start: 2017-11-27 | End: 2018-01-03 | Stop reason: SDUPTHER

## 2017-11-27 RX ORDER — LATANOPROST 50 UG/ML
SOLUTION/ DROPS OPHTHALMIC
Qty: 7.5 ML | Refills: 1 | Status: SHIPPED | OUTPATIENT
Start: 2017-11-27 | End: 2018-10-22 | Stop reason: SDUPTHER

## 2017-11-27 NOTE — TELEPHONE ENCOUNTER
Patient needed to reschedule lab appt. Rescheduled per patient request and verbalized understanding.

## 2017-12-04 ENCOUNTER — LAB VISIT (OUTPATIENT)
Dept: LAB | Facility: HOSPITAL | Age: 82
End: 2017-12-04
Attending: FAMILY MEDICINE
Payer: MEDICARE

## 2017-12-04 DIAGNOSIS — I10 ESSENTIAL HYPERTENSION: ICD-10-CM

## 2017-12-04 DIAGNOSIS — E03.4 HYPOTHYROIDISM DUE TO ACQUIRED ATROPHY OF THYROID: ICD-10-CM

## 2017-12-04 LAB
ALBUMIN SERPL BCP-MCNC: 3.9 G/DL
ALP SERPL-CCNC: 106 U/L
ALT SERPL W/O P-5'-P-CCNC: 10 U/L
ANION GAP SERPL CALC-SCNC: 7 MMOL/L
AST SERPL-CCNC: 18 U/L
BILIRUB SERPL-MCNC: 0.4 MG/DL
BUN SERPL-MCNC: 14 MG/DL
CALCIUM SERPL-MCNC: 10.5 MG/DL
CHLORIDE SERPL-SCNC: 108 MMOL/L
CO2 SERPL-SCNC: 27 MMOL/L
CREAT SERPL-MCNC: 0.7 MG/DL
EST. GFR  (AFRICAN AMERICAN): >60 ML/MIN/1.73 M^2
EST. GFR  (NON AFRICAN AMERICAN): >60 ML/MIN/1.73 M^2
GLUCOSE SERPL-MCNC: 96 MG/DL
POTASSIUM SERPL-SCNC: 4.8 MMOL/L
PROT SERPL-MCNC: 6.9 G/DL
SODIUM SERPL-SCNC: 142 MMOL/L
T4 FREE SERPL-MCNC: 1.09 NG/DL
TSH SERPL DL<=0.005 MIU/L-ACNC: 4.06 UIU/ML

## 2017-12-04 PROCEDURE — 80053 COMPREHEN METABOLIC PANEL: CPT

## 2017-12-04 PROCEDURE — 84439 ASSAY OF FREE THYROXINE: CPT

## 2017-12-04 PROCEDURE — 84443 ASSAY THYROID STIM HORMONE: CPT

## 2017-12-04 PROCEDURE — 36415 COLL VENOUS BLD VENIPUNCTURE: CPT | Mod: PO

## 2017-12-06 ENCOUNTER — OFFICE VISIT (OUTPATIENT)
Dept: FAMILY MEDICINE | Facility: CLINIC | Age: 82
End: 2017-12-06
Payer: MEDICARE

## 2017-12-06 ENCOUNTER — TELEPHONE (OUTPATIENT)
Dept: FAMILY MEDICINE | Facility: CLINIC | Age: 82
End: 2017-12-06

## 2017-12-06 VITALS
SYSTOLIC BLOOD PRESSURE: 112 MMHG | DIASTOLIC BLOOD PRESSURE: 70 MMHG | OXYGEN SATURATION: 100 % | HEIGHT: 62 IN | HEART RATE: 71 BPM

## 2017-12-06 DIAGNOSIS — M51.36 DDD (DEGENERATIVE DISC DISEASE), LUMBAR: ICD-10-CM

## 2017-12-06 DIAGNOSIS — M54.16 LUMBAR RADICULOPATHY: Primary | ICD-10-CM

## 2017-12-06 DIAGNOSIS — E03.4 HYPOTHYROIDISM DUE TO ACQUIRED ATROPHY OF THYROID: ICD-10-CM

## 2017-12-06 DIAGNOSIS — I10 ESSENTIAL HYPERTENSION: ICD-10-CM

## 2017-12-06 PROCEDURE — 99999 PR PBB SHADOW E&M-EST. PATIENT-LVL IV: CPT | Mod: PBBFAC,,, | Performed by: FAMILY MEDICINE

## 2017-12-06 PROCEDURE — 99214 OFFICE O/P EST MOD 30 MIN: CPT | Mod: S$PBB,,, | Performed by: FAMILY MEDICINE

## 2017-12-06 PROCEDURE — 99214 OFFICE O/P EST MOD 30 MIN: CPT | Mod: PBBFAC,PO | Performed by: FAMILY MEDICINE

## 2017-12-06 PROCEDURE — G0008 ADMIN INFLUENZA VIRUS VAC: HCPCS | Mod: PBBFAC,PO

## 2017-12-06 PROCEDURE — 96372 THER/PROPH/DIAG INJ SC/IM: CPT | Mod: PBBFAC,59,PO

## 2017-12-06 RX ORDER — HYDROCODONE BITARTRATE AND ACETAMINOPHEN 5; 325 MG/1; MG/1
1 TABLET ORAL EVERY 6 HOURS PRN
Qty: 30 TABLET | Refills: 0 | Status: SHIPPED | OUTPATIENT
Start: 2017-12-06 | End: 2017-12-15 | Stop reason: SDUPTHER

## 2017-12-06 RX ORDER — KETOROLAC TROMETHAMINE 30 MG/ML
30 INJECTION, SOLUTION INTRAMUSCULAR; INTRAVENOUS
Status: COMPLETED | OUTPATIENT
Start: 2017-12-06 | End: 2017-12-06

## 2017-12-06 RX ADMIN — KETOROLAC TROMETHAMINE 30 MG: 30 INJECTION, SOLUTION INTRAMUSCULAR at 11:12

## 2017-12-06 NOTE — TELEPHONE ENCOUNTER
Spoke with patient and she stated that she is having pain but not sure she should come in for appointment. Advised that she needs to come in if she is not feeling well so it can be addressed. No available appts until 12/26. Verbalized understanding and stated that she will come in today as scheduled.

## 2017-12-06 NOTE — PROGRESS NOTES
[facility] Home Visit    Subjective:       Patient ID: Martha Peres is a 85 y.o. female.    Here for 3 month f/u on chronic health issues.     HPI:     C/o wornsened left side pain for the past 3 days  Taking gabapentin 3 times daily and tramadol TID    She has a 24 hour sitter.  Using wheelchair with assistance for ambulation  Son lives next door and periodically helps..  [facility]  nurse coming weekly.  Using tramadol 1 every 6hours.        Hypertension   Pertinent negatives include no chest pain, headaches, neck pain, palpitations or shortness of breath.       Past Medical History:   Diagnosis Date    Anticoagulant long-term use     Arthritis     Benign essential tremor 2000    Bilateral    Breast cancer     left , no B/p or sticks to left    Cataract     ou done    Colon polyps     Coronary artery disease     2 stents    DDD (degenerative disc disease), lumbar     Encounter for blood transfusion     GERD (gastroesophageal reflux disease)     Glaucoma     left eye    Hemiparesis affecting left side as late effect of cerebrovascular accident     HLD (hyperlipidemia)     HTN (hypertension)     Hyperparathyroidism     Hypothyroidism     Mobility impaired     uses wheelchair    Neck pain     radiating to both shoulders and arms    Osteoporosis     PVD (peripheral vascular disease)     Stroke     August 2016       Past Surgical History:   Procedure Laterality Date    APPENDECTOMY      BLADDER SUSPENSION      BREAST LUMPECTOMY      no bp/iv left arm    CARDIAC SURGERY      COLONOSCOPY      CORONARY ANGIOPLASTY WITH STENT PLACEMENT      x 2    Epidural steroid injection      Pain managment    EYE SURGERY      bilat phaco with iol    TONSILLECTOMY      TOTAL ABDOMINAL HYSTERECTOMY         Review of patient's allergies indicates:   Allergen Reactions    No known drug allergies        Social History     Social History    Marital status:      Spouse name: N/A    Number of  children: N/A    Years of education: N/A     Occupational History    Not on file.     Social History Main Topics    Smoking status: Never Smoker    Smokeless tobacco: Never Used    Alcohol use No    Drug use: No    Sexual activity: Not on file     Other Topics Concern    Not on file     Social History Narrative    No narrative on file       Current Outpatient Prescriptions on File Prior to Visit   Medication Sig Dispense Refill    aspirin (ECOTRIN) 81 MG EC tablet Take 1 tablet by mouth once daily.       atorvastatin (LIPITOR) 10 MG tablet TAKE ONE TABLET BY MOUTH EVERY evening 30 tablet 6    brimonidine 0.2% (ALPHAGAN) 0.2 % Drop instill ONE DROP IN EACH EYE TWICE DAILY 10 mL 1    calcium carbonate-vitamin D3 (CALCIUM 600 WITH VITAMIN D3) 600 mg(1,500mg) -500 unit Cap Take 2 tablets by mouth once daily.      clopidogrel (PLAVIX) 75 mg tablet TAKE ONE TABLET BY MOUTH EVERY DAY 30 tablet 11    dorzolamide (TRUSOPT) 2 % ophthalmic solution instill ONE drop IN EACH EYE TWICE DAILY 10 mL 5    ergocalciferol (VITAMIN D2) 50,000 unit Cap Take 50,000 Units by mouth every 7 days. Not sure of dosage//      furosemide (LASIX) 20 MG tablet Take 1 tablet (20 mg total) by mouth once daily. 30 tablet 2    gabapentin (NEURONTIN) 300 MG capsule TAKE 1 CAPSULE BY MOUTH AT BEDTIME FOR 1 WEEK, THEN TAKE 1 CAPSULE TWICE DAILY FOR 1 WEEK, THEN TAKE 1 CAPSULE THREE TIMES DAILY thereafter 90 capsule 0    latanoprost 0.005 % ophthalmic solution instill ONE drop IN EACH EYE AT BEDTIME 7.5 mL 1    levothyroxine (SYNTHROID) 50 MCG tablet TAKE ONE TABLET BY MOUTH BEFORE breakfast 30 tablet 0    lisinopril (PRINIVIL,ZESTRIL) 20 MG tablet Take 0.5 tablets (10 mg total) by mouth once daily. 15 tablet 5    multivitamin-minerals-lutein Tab Take 1 tablet by mouth once daily.      ondansetron (ZOFRAN) 4 MG tablet Take 1 tablet (4 mg total) by mouth every 8 (eight) hours as needed for Nausea. 30 tablet 0    propranolol  (INNOPRAN XL) 80 mg 24 hr capsule Take 1 capsule (80 mg total) by mouth every evening. 30 capsule 11    rabeprazole (ACIPHEX) 20 mg tablet TAKE ONE TABLET BY MOUTH EVERY MORNING 30 tablet 4    tramadol (ULTRAM) 50 mg tablet Take 2 tablets (100 mg total) by mouth every 8 (eight) hours as needed for Pain. (Patient taking differently: Take 50 mg by mouth every 8 (eight) hours as needed for Pain. Takes one 50 mg tablet as directed) 180 tablet 5    ASCORBATE CALCIUM (VITAMIN C ORAL) Take 1 tablet by mouth once daily.        No current facility-administered medications on file prior to visit.        Family History   Problem Relation Age of Onset    Essential Tremor Father     Stroke Father     Heart attack Father     Essential Tremor Sister     Stroke Sister     Essential Tremor Brother     Stroke Brother     Heart attack Brother     Stroke Mother     Heart attack Mother     Stroke Brother     Heart attack Brother     Stroke Brother     Heart attack Brother     Stroke Brother     Heart attack Brother     Stroke Brother     Heart attack Brother     Stroke Sister     Heart disease Sister     Stroke Sister     Heart attack Sister     Stroke Sister     Heart attack Sister     Essential Tremor Daughter     Essential Tremor Son     Anesthesia problems Neg Hx     Clotting disorder Neg Hx     Amblyopia Neg Hx     Blindness Neg Hx     Cancer Neg Hx     Cataracts Neg Hx     Diabetes Neg Hx     Glaucoma Neg Hx     Hypertension Neg Hx     Macular degeneration Neg Hx     Retinal detachment Neg Hx     Strabismus Neg Hx        Review of Systems   Constitutional: Negative for appetite change, chills, fever and unexpected weight change.   HENT: Negative for sore throat and trouble swallowing.    Eyes: Negative for pain and visual disturbance.   Respiratory: Negative for cough, shortness of breath and wheezing.    Cardiovascular: Negative for chest pain and palpitations.   Gastrointestinal: Negative  "for abdominal pain, blood in stool, diarrhea, nausea and vomiting.   Genitourinary: Negative for difficulty urinating, dysuria and hematuria.   Musculoskeletal: Positive for arthralgias, back pain and gait problem. Negative for neck pain.   Skin: Negative for rash and wound.   Neurological: Positive for weakness (left arm and leg). Negative for dizziness, numbness and headaches.   Hematological: Negative for adenopathy.   Psychiatric/Behavioral: Negative for dysphoric mood.       Objective:      /70 (BP Location: Left arm, Patient Position: Sitting, BP Method: Large (Manual))   Pulse 71   Ht 5' 2" (1.575 m)   SpO2 100%     Physical Exam   Constitutional: She is oriented to person, place, and time. She appears well-developed and well-nourished.   HENT:   Head: Normocephalic.   Mouth/Throat: Oropharynx is clear and moist. No oropharyngeal exudate or posterior oropharyngeal erythema.   Eyes: Conjunctivae and EOM are normal. Pupils are equal, round, and reactive to light.   Neck: Normal range of motion. Neck supple. No thyromegaly present.   Cardiovascular: Normal rate, regular rhythm, S1 normal, S2 normal, normal heart sounds and intact distal pulses.  Exam reveals no gallop and no friction rub.    No murmur heard.  Pulmonary/Chest: Effort normal and breath sounds normal. She has no wheezes. She has no rales.   Abdominal: Normal appearance.   Musculoskeletal:        Right lower leg: She exhibits no edema.        Left lower leg: She exhibits no edema.   Lymphadenopathy:     She has no cervical adenopathy.   Neurological: She is alert and oriented to person, place, and time. She displays tremor. No cranial nerve deficit. Coordination and gait abnormal.   Left arm and leg wekaness   Skin: Skin is warm and intact. No rash noted.   Psychiatric: She has a normal mood and affect.     positive SLR on left  Results for orders placed or performed in visit on 12/04/17   TSH   Result Value Ref Range    TSH 4.060 (H) 0.400 " - 4.000 uIU/mL   Comprehensive metabolic panel   Result Value Ref Range    Sodium 142 136 - 145 mmol/L    Potassium 4.8 3.5 - 5.1 mmol/L    Chloride 108 95 - 110 mmol/L    CO2 27 23 - 29 mmol/L    Glucose 96 70 - 110 mg/dL    BUN, Bld 14 8 - 23 mg/dL    Creatinine 0.7 0.5 - 1.4 mg/dL    Calcium 10.5 8.7 - 10.5 mg/dL    Total Protein 6.9 6.0 - 8.4 g/dL    Albumin 3.9 3.5 - 5.2 g/dL    Total Bilirubin 0.4 0.1 - 1.0 mg/dL    Alkaline Phosphatase 106 55 - 135 U/L    AST 18 10 - 40 U/L    ALT 10 10 - 44 U/L    Anion Gap 7 (L) 8 - 16 mmol/L    eGFR if African American >60.0 >60 mL/min/1.73 m^2    eGFR if non African American >60.0 >60 mL/min/1.73 m^2   T4, free   Result Value Ref Range    Free T4 1.09 0.71 - 1.51 ng/dL       Assessment:       1. Lumbar radiculopathy    2. Essential hypertension    3. Hypothyroidism due to acquired atrophy of thyroid    4. DDD (degenerative disc disease), lumbar        Plan:       Lumbar radiculopathy  -     ketorolac injection 30 mg; Inject 1 mL (30 mg total) into the muscle one time.  -     hydrocodone-acetaminophen 5-325mg (NORCO) 5-325 mg per tablet; Take 1 tablet by mouth every 6 (six) hours as needed for Pain.  Dispense: 30 tablet; Refill: 0    Essential hypertension    Hypothyroidism due to acquired atrophy of thyroid    DDD (degenerative disc disease), lumbar    Other orders  -     Influenza - High Dose (65+) (PF) (IM)      suspect likely left leg sciatic symptoms from lumbar ddd  Call for possible pain management injection if symptoms persist  Consider nursing home placement  continue lisinopril 10mg daily  Tramadol 100mg every 8 hours, hydrocodone for severe pain  Continue other present medications and specialty follow-up  F/u 3 months

## 2017-12-06 NOTE — TELEPHONE ENCOUNTER
----- Message from Cassia Jacinto sent at 12/6/2017  8:02 AM CST -----  Call [phone]  / not sure if she should come to today's appt at 10:20  / has pain left side  / is paralyzed from stroke / taking Tramadol / not helping / next appt is 12/26 ... Would like to speak to nurse

## 2017-12-12 ENCOUNTER — TELEPHONE (OUTPATIENT)
Dept: PAIN MEDICINE | Facility: CLINIC | Age: 82
End: 2017-12-12

## 2017-12-12 NOTE — TELEPHONE ENCOUNTER
----- Message from Stacia Zhang sent at 12/11/2017 12:39 PM CST -----  Contact: self  Patient needs first available appointment due to injection in lower back. Please call patient at [phone]. Thanks!

## 2017-12-13 ENCOUNTER — TELEPHONE (OUTPATIENT)
Dept: PAIN MEDICINE | Facility: CLINIC | Age: 82
End: 2017-12-13

## 2017-12-13 ENCOUNTER — OFFICE VISIT (OUTPATIENT)
Dept: PAIN MEDICINE | Facility: CLINIC | Age: 82
End: 2017-12-13
Payer: MEDICARE

## 2017-12-13 VITALS
DIASTOLIC BLOOD PRESSURE: 64 MMHG | TEMPERATURE: 98 F | RESPIRATION RATE: 18 BRPM | HEART RATE: 60 BPM | SYSTOLIC BLOOD PRESSURE: 138 MMHG

## 2017-12-13 DIAGNOSIS — M51.36 DDD (DEGENERATIVE DISC DISEASE), LUMBAR: ICD-10-CM

## 2017-12-13 DIAGNOSIS — M54.16 LUMBAR RADICULOPATHY: Primary | ICD-10-CM

## 2017-12-13 PROCEDURE — 99214 OFFICE O/P EST MOD 30 MIN: CPT | Mod: PBBFAC,PO | Performed by: PHYSICIAN ASSISTANT

## 2017-12-13 PROCEDURE — 99213 OFFICE O/P EST LOW 20 MIN: CPT | Mod: S$PBB,,, | Performed by: PHYSICIAN ASSISTANT

## 2017-12-13 PROCEDURE — 99999 PR PBB SHADOW E&M-EST. PATIENT-LVL IV: CPT | Mod: PBBFAC,,, | Performed by: PHYSICIAN ASSISTANT

## 2017-12-13 RX ORDER — LIDOCAINE HYDROCHLORIDE 10 MG/ML
1 INJECTION, SOLUTION EPIDURAL; INFILTRATION; INTRACAUDAL; PERINEURAL ONCE
Status: DISCONTINUED | OUTPATIENT
Start: 2017-12-13 | End: 2018-12-22 | Stop reason: HOSPADM

## 2017-12-13 NOTE — TELEPHONE ENCOUNTER
----- Message from Marj Zacarias sent at 12/12/2017  8:37 AM CST -----  Contact: Patient  Martha, patient  [phone], Patient is hurting real bad in lower back and left hip and leg. Keeping her awake at night. Wanting to schedule back injections again. Patient just saw Dr Sheppard last week and was advised to see Dr Carrion for injections. Please advise. Thanks.

## 2017-12-13 NOTE — TELEPHONE ENCOUNTER
Patient is scheduled to have a caudal epidural injection on 1/2/18 and she will need to stop her aspirin and her plavix for 7 days. Please Advise.

## 2017-12-13 NOTE — TELEPHONE ENCOUNTER
Patient has been scheduled for a cardiac clearance with Dr Monroe on 12/19/17. She was wanting to See Dr De Leon however no availability until jan. Patient verbalized understanding

## 2017-12-13 NOTE — PROGRESS NOTES
CHIEF COMPLAINT/REASON FOR VISIT: neck pain    HPI: The patient is an 85-year-old Female with a history of breast cancer, hypertension, hypothyroidism and complaints of bilateral posterior neck and shoulder pain, worse on the left.  She returns in follow-up today with low back and left leg pain.  This has been slowly returning but she states it has been severe over the past month.  She describes the pain is sharp and it is located across the low back radiating to the left buttock, entire left leg, foot and left toes.  The pain is constant and not significantly worse in any position.  She is unable to stand on her own due to left-sided weakness from a CVA last year.  She does have some relief from pain medicine she received through primary care.  She denies having any numbness, no bladder or bowel incontinence.    Pain intervention history: She is only taking hydrocodone-acetaminophen 10/500 with mild relief. She tried Flexeril at nighttime without much relief. She undergone physical therapy but felt like it made her worse. She is now status post cervical epidural steroid injection on 3/2/11 and 5/18/11 now with greater than 95% relief on going. She is status post cervical MYLES on 7/30/13 and again on 8/27/13 with 75% relief.  She is status post C7-T1 cervical interlaminar epidural steroid injection on 11/18/13 with greater than 50% relief of her neck pain.  She is status post C7-T1 cervical interlaminar epidural steroid injection on 1/20/14 with 90% relief in her neck and 100% relief in her arms. She is status post C7-T1 cervical interlaminar epidural steroid injection on 8/15/14 with 50% relief of her pain.  She is status post C7-T1 cervical interlaminar epidural steroid injection on 9/2/15 with 100% relief.  She is status post L5/S1 interlaminar epidural steroid injection to the right on 10/7/15 with 100% relief of her low back and right leg pain.  She is status post C7-T1 cervical interlaminar epidural steroid  injection on 12/22/15 with less than 50% relief.  She is status post C7-T1 cervical interlaminar epidural steroid injection on 16 with moderate relief.  She is status post caudal epidural steroid injection on 10/28/16 with about 50% relief.    Review of systems: Patient reports neck pain and left shoulder pain.  Balance of review of systems is negative.    Medical, surgical, family and social history reviewed elsewhere in record.     Medications/Allergies: See med card    Vitals:    17 0819   BP: 138/64   Pulse: 60   Resp: 18   Temp: 98 °F (36.7 °C)   TempSrc: Oral   PainSc: 10-Worst pain ever   PainLoc: Back       PHYSICAL EXAM:  Gen: A and O x3, pleasant, well-groomed  Skin: No rashes or obvious lesions  HEENT: PERRLA   CVS: Regular rate and rhythm, normal S1 and S2, no murmurs.  Resp: Clear to auscultation bilaterally, no wheezes or rales.  Abdomen: Soft, NT/ND, normal bowel sounds present.  Musculoskeletal:  Presents in wheelchair today, unable to move left arm, able to slightly move left leg     Neuro:  Lower extremities: 5/5 strength right side, 2/5 left side   Reflexes:  Patellar 0+, Achilles 0+.  Sensory: Intact and symmetrical to light touch and pinprick in C2-T1 dermatomes on the right, mild decrease sensation on the left. Intact and symmetrical to light touch and pinprick in L2-S1 dermatomes on the right, mild decrease sensation on the left.    Lumbar spine:  Lumbar spine: Unable to stand without assistance due to past CVA and left-sided weakness.  Catracho's test is deferred.  Straight leg raise causes left buttock and left leg pain.   Internal and external rotation of the hip causes no increased pain on either side.  Myofascial exam: Mild tenderness to palpation across the lumbar paraspinous muscles.      IMAGIN12 MRI C-spine  C2/3 there is no evidence of disk protrusion, canal or foraminal stenosis.  C3/4: There is annular disk bulge and small posterior canal ligamentous hypertrophy  which combine to cause moderate canal distortion. The cord does not appear contacted. This is not significant changed relative to the prior exam.  C4/5: There is a small central disk protrusion this combines with some mild posterior canal ligamentous hypertrophy to produce moderate canal stenosis. The cord does not appear contacted in the AP canal measures approximately 11 mm the foramina are intact.  C5/6: Moderate left and mild to moderate right-sided uncal for T. both induced neural foraminal narrowing is noted. There slight disk bulging with Vaughn mild AP canal distortion.  C6/7: There is a mild posterior canal at mid is hypertrophy and mild left greater than right uncovertebral induced neural foraminal narrowing. The central canal is only slightly distorted.  C7/T1: There is slight disk bulging without evidence of significant canal or foraminal stenosis.    1/13/14 MRI cervical spine  At the C2-C3 level, minimal disk bulge of one to 2 mm may be noted. No significant central canal stenosis, or foraminal narrowing, or detrimental change is noted  At the C3-C4 level, mild broad-based bulging is noted of approximately 2 mm, minimal central canal narrowing and neuroforaminal narrowing is noted. No convincing detrimental change is noted since the prior.  At the C4-C5 level, minimal broad-based bulging is noted centrally at one to 2 mm no convincing detrimental change since the prior exam. Mild central canal narrowing is noted. Mild right greater than left nerve foraminal narrowing is noted. No convincing the detrimental change is noted since the prior. Facet arthropathy is noted bilaterally.  At the C5-C6 level uncovertebral spurring is noted bilaterally that may relate to disk osteophyte and protrusion towards each nerve foramen of two to 3 mm. Facet arthropathy is noted bilaterally. Mild bilateral neuroforamina narrowing is noted. Minimal central canal narrowing is noted. No convincing detrimental change is  noted.  At the C6-C7 level, uncovertebral spurring appears to be present bilaterally. Broad-based disk osteophyte bulging may be noted to each neuroforamen of one to 2 mm. Mild bilateral nerve foraminal narrowing is noted. No significant central canal narrowing is noted. A similar appearance is noted on the prior  At the C7-T1 level, minimal disk bulge may be noted and most, no significant central canal stenosis or nerve foraminal stenosis is noted. No convincing detrimental changes noted.    6/15/15 MRI lumbar spine  T12-L1: No central canal or neuroforaminal stenosis. No disc protrusion or extrusion. There is ligamentum flavum thickening and facet arthropathy.  L1-L2: There is minor facet arthropathy. There is an annular tear of the central portion of the intervertebral disc. No central canal or neuroforaminal stenosis. No disc protrusion or extrusion.  L2-L3: There is ligamentum flavum thickening and facet arthropathy. No central canal or neuroforaminal stenosis. No disc protrusion or extrusion.  L3-L4: There is a broad-based bulge which extends into both neuroforamina. There is ligamentum flavum thickening and facet arthropathy. No central canal or neuroforaminal stenosis. No disc protrusion or extrusion.  L4-L5: There is bilateral facet arthropathy. No central canal or neuroforaminal stenosis. No disc protrusion or extrusion.  L5-S1: There is a grade II anterolisthesis of L5 on S1. There is severe bilateral facet arthropathy. There is ligamentum flavum thickening. There is at least moderate left neuroforaminal stenosis. No right neuroforaminal stenosis. No central canal stenosis.       ASSESSMENT:  The patient is an 85-year-old Female with a history of breast cancer, hypertension, hypothyroidism and complaints of bilateral posterior neck and shoulder pain, worse on the left.      1. Lumbar radiculopathy     2. DDD (degenerative disc disease), lumbar         Plan:  1.  I will schedule patient for a caudal MYLES.   She has done well with this in the past.  2.  Follow-up in 4 weeks post procedure or sooner as needed.    Greater than 50% of this 15 minute visit was spent on counseling patient.

## 2017-12-15 DIAGNOSIS — M54.16 LUMBAR RADICULOPATHY: ICD-10-CM

## 2017-12-15 RX ORDER — RABEPRAZOLE SODIUM 20 MG/1
TABLET, DELAYED RELEASE ORAL
Qty: 30 TABLET | Refills: 0 | Status: SHIPPED | OUTPATIENT
Start: 2017-12-15 | End: 2018-01-12 | Stop reason: SDUPTHER

## 2017-12-15 RX ORDER — HYDROCODONE BITARTRATE AND ACETAMINOPHEN 5; 325 MG/1; MG/1
1 TABLET ORAL EVERY 6 HOURS PRN
Qty: 30 TABLET | Refills: 0 | Status: SHIPPED | OUTPATIENT
Start: 2017-12-15 | End: 2018-01-11 | Stop reason: SDUPTHER

## 2017-12-15 NOTE — TELEPHONE ENCOUNTER
----- Message from Misty Lafleur sent at 12/15/2017 12:51 PM CST -----  Patient is requesting a early refill of hydrocodone-acetaminophen 5-325mg (NORCO) 5-325 mg per tablet please call [phone] (home)

## 2017-12-15 NOTE — TELEPHONE ENCOUNTER
----- Message from Margaret Herrera sent at 12/15/2017 12:49 PM CST -----  Contact: self  Patient [phone] is requesting a refill on [place] at her pharmacy/    [facility]- [address] [address]  Phone: [phone] Fax: [phone]

## 2017-12-15 NOTE — TELEPHONE ENCOUNTER
Spoke with patient and she stated that she has made an appointment for injections with Murali but it after the first of the year and she stated that she is still in pain. Would like to see if you can send in a refill of her pain medication.

## 2017-12-19 ENCOUNTER — OFFICE VISIT (OUTPATIENT)
Dept: CARDIOLOGY | Facility: CLINIC | Age: 82
End: 2017-12-19
Payer: MEDICARE

## 2017-12-19 VITALS
SYSTOLIC BLOOD PRESSURE: 121 MMHG | DIASTOLIC BLOOD PRESSURE: 54 MMHG | WEIGHT: 105 LBS | HEIGHT: 62 IN | HEART RATE: 65 BPM | BODY MASS INDEX: 19.32 KG/M2

## 2017-12-19 DIAGNOSIS — Z01.810 PREOP CARDIOVASCULAR EXAM: ICD-10-CM

## 2017-12-19 DIAGNOSIS — I25.10 CORONARY ARTERY DISEASE INVOLVING NATIVE CORONARY ARTERY OF NATIVE HEART WITHOUT ANGINA PECTORIS: ICD-10-CM

## 2017-12-19 DIAGNOSIS — Z95.5 STATUS POST CORONARY ARTERY STENT PLACEMENT: Primary | ICD-10-CM

## 2017-12-19 PROCEDURE — 99204 OFFICE O/P NEW MOD 45 MIN: CPT | Mod: S$PBB,,, | Performed by: INTERNAL MEDICINE

## 2017-12-19 PROCEDURE — 99999 PR PBB SHADOW E&M-EST. PATIENT-LVL III: CPT | Mod: PBBFAC,,, | Performed by: INTERNAL MEDICINE

## 2017-12-19 PROCEDURE — 99213 OFFICE O/P EST LOW 20 MIN: CPT | Mod: PBBFAC,PO | Performed by: INTERNAL MEDICINE

## 2017-12-19 RX ORDER — PROPRANOLOL HYDROCHLORIDE 80 MG/1
CAPSULE, EXTENDED RELEASE ORAL
Refills: 11 | COMMUNITY
Start: 2017-12-09 | End: 2018-07-31 | Stop reason: SDUPTHER

## 2017-12-19 NOTE — PROGRESS NOTES
Subjective:    Patient ID:  Martha Peres is a 85 y.o. female who presents for evaluation of Follow-up (follow up ); clearance for procedure; and Hypertension      HPI85 yo female with hx of coronary stent in 2011 with follow up nuclear stress test in 2012 that was normal here for preop clearance. Patient also has hx of CVA with left sided weakness for which the plavix was started.No recent cardiac events. Denies CP or SOB.    Review of Systems   Constitution: Negative for decreased appetite, fever, weakness, malaise/fatigue, weight gain and weight loss.   HENT: Negative for hearing loss and nosebleeds.    Eyes: Negative for visual disturbance.   Cardiovascular: Negative for chest pain, claudication, cyanosis, dyspnea on exertion, irregular heartbeat, leg swelling, near-syncope, orthopnea, palpitations, paroxysmal nocturnal dyspnea and syncope.   Respiratory: Negative for cough, hemoptysis, shortness of breath, sleep disturbances due to breathing, snoring and wheezing.    Endocrine: Negative for cold intolerance, heat intolerance, polydipsia and polyuria.   Hematologic/Lymphatic: Negative for adenopathy and bleeding problem. Does not bruise/bleed easily.   Skin: Negative for color change, itching, poor wound healing, rash and suspicious lesions.   Musculoskeletal: Positive for back pain. Negative for arthritis, falls, joint pain, joint swelling, muscle cramps, muscle weakness and myalgias.   Gastrointestinal: Negative for bloating, abdominal pain, change in bowel habit, constipation, flatus, heartburn, hematemesis, hematochezia, hemorrhoids, jaundice, melena, nausea and vomiting.   Genitourinary: Negative for bladder incontinence, decreased libido, frequency, hematuria, hesitancy and urgency.   Neurological: Positive for disturbances in coordination, focal weakness and numbness. Negative for brief paralysis, difficulty with concentration, excessive daytime sleepiness, dizziness, headaches, light-headedness, loss of  "balance and vertigo.   Psychiatric/Behavioral: Negative for altered mental status, depression and memory loss. The patient does not have insomnia and is not nervous/anxious.    Allergic/Immunologic: Negative for environmental allergies, hives and persistent infections.        Objective:    Physical Exam   Constitutional: She is oriented to person, place, and time. She appears well-developed and well-nourished.   BP (!) 121/54 (BP Location: Right arm, Patient Position: Sitting, BP Method: Medium (Automatic))   Pulse 65   Ht 5' 2" (1.575 m)   Wt 47.6 kg (105 lb)   BMI 19.20 kg/m²      HENT:   Head: Normocephalic and atraumatic.   Right Ear: External ear normal.   Left Ear: External ear normal.   Nose: Nose normal.   Mouth/Throat: Oropharynx is clear and moist.   Eyes: Conjunctivae, EOM and lids are normal. Pupils are equal, round, and reactive to light. Right eye exhibits no discharge. Left eye exhibits no discharge. Right conjunctiva has no hemorrhage. No scleral icterus.   Neck: Normal range of motion. Neck supple. No JVD present. No tracheal deviation present. No thyromegaly present.   Cardiovascular: Normal rate, regular rhythm, normal heart sounds and intact distal pulses.  Exam reveals no gallop and no friction rub.    No murmur heard.  Pulmonary/Chest: Effort normal and breath sounds normal. No respiratory distress. She has no wheezes. She has no rales. She exhibits no tenderness. Breasts are symmetrical.   Abdominal: Soft. Bowel sounds are normal. She exhibits no distension and no mass. There is no hepatosplenomegaly or hepatomegaly. There is no tenderness. There is no rebound and no guarding.   Musculoskeletal: Normal range of motion. She exhibits no edema or tenderness.   Lymphadenopathy:     She has no cervical adenopathy.   Neurological: She is alert and oriented to person, place, and time. She displays abnormal reflex. No cranial nerve deficit. Coordination abnormal.   Left upper extremity paralysis "   Skin: Skin is warm and dry. No rash noted. No erythema. No pallor.   Psychiatric: She has a normal mood and affect. Her behavior is normal. Judgment and thought content normal.   Nursing note and vitals reviewed.        Assessment:       1. Status post coronary artery stent placement - coated stent LCX, RCA  07/26/2011; residual 70% LAD    2. Coronary artery disease involving native coronary artery of native heart without angina pectoris    3. Preop cardiovascular exam         Plan:     Patient can Dc plavix from cardiac standpoint. Can stop both ASA and plavix for seven days prior to epidural injection and restart when OK by surgeon.  Continue other meds   No orders of the defined types were placed in this encounter.    Return in about 6 months (around 6/19/2018).

## 2017-12-22 DIAGNOSIS — M51.36 DDD (DEGENERATIVE DISC DISEASE), LUMBAR: Primary | ICD-10-CM

## 2017-12-26 DIAGNOSIS — G25.0 ESSENTIAL TREMOR: ICD-10-CM

## 2017-12-27 ENCOUNTER — TELEPHONE (OUTPATIENT)
Dept: NEUROLOGY | Facility: CLINIC | Age: 82
End: 2017-12-27

## 2017-12-27 RX ORDER — GABAPENTIN 300 MG/1
CAPSULE ORAL
Qty: 90 CAPSULE | Refills: 0 | Status: SHIPPED | OUTPATIENT
Start: 2017-12-27 | End: 2018-01-25 | Stop reason: SDUPTHER

## 2017-12-27 NOTE — TELEPHONE ENCOUNTER
----- Message from Margaret Henson sent at 12/27/2017 11:29 AM CST -----  Contact: [place]  Patient is returning call regarding appointment and states can not make it this week as no transportation. States will call back for appointment at later date. Thanks!

## 2017-12-28 ENCOUNTER — TELEPHONE (OUTPATIENT)
Dept: PAIN MEDICINE | Facility: CLINIC | Age: 82
End: 2017-12-28

## 2017-12-28 ENCOUNTER — TELEPHONE (OUTPATIENT)
Dept: HEMATOLOGY/ONCOLOGY | Facility: CLINIC | Age: 82
End: 2017-12-28

## 2017-12-28 NOTE — TELEPHONE ENCOUNTER
----- Message from Agueda Baxter sent at 12/28/2017 10:37 AM CST -----  Contact: Richie alfonso/[employer] [phone]  The genetic testing that you ordered was denied.  She is calling to request that you send additional info to utilization management.  Please call them at [phone].  Thank you!

## 2017-12-28 NOTE — TELEPHONE ENCOUNTER
Spoke with patient's son regarding medications. Patient will be off Plavix and Aspirin 6 days for procedure. OK to procedure?

## 2017-12-28 NOTE — TELEPHONE ENCOUNTER
----- Message from Agueda Baxter sent at 12/28/2017 10:31 AM CST -----  Contact: Son Andrés [phone]  Please call him regarding her upcoming procedure.  He needs to know what medications she needs to discontinue.  Thank you!

## 2017-12-29 NOTE — TELEPHONE ENCOUNTER
Returned call to [facility] on both numbers (multiple times) listed and was unable to actually connect with a person to speak with to resolve the issue.

## 2018-01-02 ENCOUNTER — HOSPITAL ENCOUNTER (OUTPATIENT)
Facility: HOSPITAL | Age: 83
Discharge: HOME | End: 2018-01-02
Attending: ANESTHESIOLOGY | Admitting: ANESTHESIOLOGY
Payer: MEDICARE

## 2018-01-02 ENCOUNTER — HOSPITAL ENCOUNTER (OUTPATIENT)
Dept: RADIOLOGY | Facility: HOSPITAL | Age: 83
Discharge: HOME | End: 2018-01-02
Attending: ANESTHESIOLOGY | Admitting: ANESTHESIOLOGY
Payer: MEDICARE

## 2018-01-02 ENCOUNTER — SURGERY (OUTPATIENT)
Age: 83
End: 2018-01-02

## 2018-01-02 VITALS
HEIGHT: 62 IN | DIASTOLIC BLOOD PRESSURE: 78 MMHG | WEIGHT: 107 LBS | HEART RATE: 78 BPM | RESPIRATION RATE: 18 BRPM | BODY MASS INDEX: 19.69 KG/M2 | TEMPERATURE: 98 F | OXYGEN SATURATION: 98 % | SYSTOLIC BLOOD PRESSURE: 170 MMHG

## 2018-01-02 DIAGNOSIS — M51.36 DDD (DEGENERATIVE DISC DISEASE), LUMBAR: ICD-10-CM

## 2018-01-02 DIAGNOSIS — M54.16 LUMBAR RADICULOPATHY: Primary | ICD-10-CM

## 2018-01-02 PROCEDURE — 62323 NJX INTERLAMINAR LMBR/SAC: CPT | Mod: ,,, | Performed by: ANESTHESIOLOGY

## 2018-01-02 PROCEDURE — 25500020 PHARM REV CODE 255: Mod: PO | Performed by: ANESTHESIOLOGY

## 2018-01-02 PROCEDURE — A4216 STERILE WATER/SALINE, 10 ML: HCPCS | Mod: PO | Performed by: ANESTHESIOLOGY

## 2018-01-02 PROCEDURE — 62323 NJX INTERLAMINAR LMBR/SAC: CPT | Mod: PO | Performed by: ANESTHESIOLOGY

## 2018-01-02 PROCEDURE — 25000003 PHARM REV CODE 250: Mod: PO | Performed by: ANESTHESIOLOGY

## 2018-01-02 PROCEDURE — 76000 FLUOROSCOPY <1 HR PHYS/QHP: CPT | Mod: TC,PO

## 2018-01-02 PROCEDURE — 63600175 PHARM REV CODE 636 W HCPCS: Mod: PO | Performed by: ANESTHESIOLOGY

## 2018-01-02 RX ORDER — LIDOCAINE HYDROCHLORIDE 10 MG/ML
INJECTION, SOLUTION EPIDURAL; INFILTRATION; INTRACAUDAL; PERINEURAL
Status: DISCONTINUED | OUTPATIENT
Start: 2018-01-02 | End: 2018-01-02 | Stop reason: HOSPADM

## 2018-01-02 RX ORDER — METHYLPREDNISOLONE ACETATE 80 MG/ML
INJECTION, SUSPENSION INTRA-ARTICULAR; INTRALESIONAL; INTRAMUSCULAR; SOFT TISSUE
Status: DISCONTINUED | OUTPATIENT
Start: 2018-01-02 | End: 2018-01-02 | Stop reason: HOSPADM

## 2018-01-02 RX ORDER — SODIUM CHLORIDE 9 MG/ML
INJECTION, SOLUTION INTRAMUSCULAR; INTRAVENOUS; SUBCUTANEOUS
Status: DISCONTINUED | OUTPATIENT
Start: 2018-01-02 | End: 2018-01-02 | Stop reason: HOSPADM

## 2018-01-02 RX ADMIN — SODIUM CHLORIDE 4 ML: 9 INJECTION INTRAMUSCULAR; INTRAVENOUS; SUBCUTANEOUS at 02:01

## 2018-01-02 RX ADMIN — IOHEXOL 3 ML: 300 INJECTION, SOLUTION INTRAVENOUS at 02:01

## 2018-01-02 RX ADMIN — METHYLPREDNISOLONE ACETATE 80 MG: 80 INJECTION, SUSPENSION INTRA-ARTICULAR; INTRALESIONAL; INTRAMUSCULAR; SOFT TISSUE at 02:01

## 2018-01-02 RX ADMIN — LIDOCAINE HYDROCHLORIDE 10 ML: 10 INJECTION, SOLUTION EPIDURAL; INFILTRATION; INTRACAUDAL; PERINEURAL at 02:01

## 2018-01-02 NOTE — DISCHARGE SUMMARY
[facility]  Discharge Note  Short Stay    Admit Date: 1/2/2018    Discharge Date: 1/2/2018    Attending Physician: Darinel Carrion MD     Discharge Provider: Darinel Carrion    Diagnoses:  Active Hospital Problems    Diagnosis  POA    *Lumbar radiculopathy [M54.16]  Yes      Resolved Hospital Problems    Diagnosis Date Resolved POA   No resolved problems to display.       Discharged Condition: good    Final Diagnoses: Lumbar radiculopathy [M54.16]    Disposition: Home or Self Care    Hospital Course: no complications, uneventful    Outcome of Hospitalization, Treatment, Procedure, or Surgery:  Patient was admitted for outpatient procedure. The patient underwent procedure without complications and are discharged home    Follow up/Patient Instructions:  Follow up as scheduled/Patient has received instructions and follow up date    Medications:  Continue previous medications      Discharge Procedure Orders  Call MD for:  temperature >100.4     Call MD for:  severe uncontrolled pain     Call MD for:  redness, tenderness, or signs of infection (pain, swelling, redness, odor or green/yellow discharge around incision site)     Call MD for:  severe persistent headache     No dressing needed           Discharge Procedure Orders (must include Diet, Follow-up, Activity):    Discharge Procedure Orders (must include Diet, Follow-up, Activity)  Call MD for:  temperature >100.4     Call MD for:  severe uncontrolled pain     Call MD for:  redness, tenderness, or signs of infection (pain, swelling, redness, odor or green/yellow discharge around incision site)     Call MD for:  severe persistent headache     No dressing needed

## 2018-01-02 NOTE — OP NOTE
PROCEDURE DATE: 1/2/2018    PROCEDURE:  Caudal epidural steroid injection under fluoroscopy.    Diagnosis: Lumbar radiculopathy    Post Op diagnosis: Same    PHYSICIAN: Darinel Carrion M.D.    MEDICATIONS INJECTED:  80 mg of methylprednisone and 4 ml of sterile, preservative-free NaCl.    LOCAL ANESTHETIC GIVEN:  Lidocaine 1%, 4 ml total    SEDATION MEDICATIONS: none    ESTIMATED BLOOD LOSS:  none    COMPLICATIONS:  none    TECHNIQUE:   After the patient was placed in prone position, the patient was prepped and draped in the usual sterile fashion using ChloraPrep and sterile towels.  Appropriate anatomic landmarks were determined by identifying the sacral hiatus in the lateral fluoroscopic view.  Local anesthetic was given via a 25g 1.5 inch needle by raising a wheal and infiltrating down to the periosteum.  A 3.5 inch 22 gauge needle was introduced thru the sacral hiatus.  Omnipaque was injected to confirm placement in the appropriate area and that there was no vascular uptake.  The medication was then injected slowly.  The patient tolerated the procedure well.    The patient was monitored after the procedure.  Patient was given post procedure and discharge instructions to follow at home.  The patient was discharged in a stable condition

## 2018-01-03 RX ORDER — LEVOTHYROXINE SODIUM 50 UG/1
TABLET ORAL
Qty: 30 TABLET | Refills: 11 | Status: SHIPPED | OUTPATIENT
Start: 2018-01-03 | End: 2019-01-14 | Stop reason: SDUPTHER

## 2018-01-10 DIAGNOSIS — M54.16 LUMBAR RADICULOPATHY: ICD-10-CM

## 2018-01-10 NOTE — TELEPHONE ENCOUNTER
Spoke with patient. Patient stated she is having increased pain after injection. Patient stated its painful to sit down and lay down. Patient is asking for medication for pain. Please advise. Thanks.

## 2018-01-10 NOTE — TELEPHONE ENCOUNTER
----- Message from Cristy Bacon sent at 1/10/2018  8:32 AM CST -----  Patient has appointment scheduled for January 26th/patient stated she is experiencing alot of pain/has appointment tomorrow with eye doctor at 10:15/would like to be seen tomorrow after that if possible/please call patient back at [phone] to reschedule or advise.

## 2018-01-11 RX ORDER — HYDROCODONE BITARTRATE AND ACETAMINOPHEN 5; 325 MG/1; MG/1
1 TABLET ORAL 2 TIMES DAILY PRN
Qty: 30 TABLET | Refills: 0 | Status: SHIPPED | OUTPATIENT
Start: 2018-01-11 | End: 2018-01-23 | Stop reason: SDUPTHER

## 2018-01-12 ENCOUNTER — TELEPHONE (OUTPATIENT)
Dept: PAIN MEDICINE | Facility: CLINIC | Age: 83
End: 2018-01-12

## 2018-01-12 RX ORDER — RABEPRAZOLE SODIUM 20 MG/1
TABLET, DELAYED RELEASE ORAL
Qty: 30 TABLET | Refills: 6 | Status: SHIPPED | OUTPATIENT
Start: 2018-01-12 | End: 2018-08-25 | Stop reason: SDUPTHER

## 2018-01-12 NOTE — TELEPHONE ENCOUNTER
Spoke with patient and explained to her that the prescription was sent to pharmacy [facility]. She voiced understanding and will have son go pick it up.

## 2018-01-12 NOTE — TELEPHONE ENCOUNTER
----- Message from Miranda Fairchild sent at 1/12/2018  1:01 PM CST -----  Contact: pt [phone]  Patient called and asked for a call back she said the nurse was going to call her back yesterday to have some pain medication to be called in yesterday. Her son has checked and the medication has not been called in at this time.

## 2018-01-23 DIAGNOSIS — M54.16 LUMBAR RADICULOPATHY: ICD-10-CM

## 2018-01-23 RX ORDER — HYDROCODONE BITARTRATE AND ACETAMINOPHEN 5; 325 MG/1; MG/1
1 TABLET ORAL 2 TIMES DAILY PRN
Qty: 30 TABLET | Refills: 0 | Status: SHIPPED | OUTPATIENT
Start: 2018-01-23 | End: 2018-02-02 | Stop reason: SDUPTHER

## 2018-01-23 NOTE — TELEPHONE ENCOUNTER
----- Message from Dwayne Palumbo sent at 1/23/2018 10:51 AM CST -----  Contact: Martha  Still having lots of pain in lower back, feet, hips and legs. She said the injection did not help at all. She is needing a refill on her pain medication Rx hydrocodone-acetaminophen 5-325mg (NORCO) 5-325 mg per tablet. Please call her [phone] (home)       [facility]- [address] [address]  Phone: [phone] Fax: [phone]

## 2018-01-25 DIAGNOSIS — G25.0 ESSENTIAL TREMOR: ICD-10-CM

## 2018-01-25 RX ORDER — GABAPENTIN 300 MG/1
CAPSULE ORAL
Qty: 90 CAPSULE | Refills: 0 | Status: SHIPPED | OUTPATIENT
Start: 2018-01-25 | End: 2018-02-26 | Stop reason: SDUPTHER

## 2018-02-02 DIAGNOSIS — M54.16 LUMBAR RADICULOPATHY: ICD-10-CM

## 2018-02-05 RX ORDER — HYDROCODONE BITARTRATE AND ACETAMINOPHEN 5; 325 MG/1; MG/1
1 TABLET ORAL 2 TIMES DAILY PRN
Qty: 30 TABLET | Refills: 0 | Status: SHIPPED | OUTPATIENT
Start: 2018-02-05 | End: 2018-02-22 | Stop reason: SDUPTHER

## 2018-02-09 DIAGNOSIS — M50.30 DDD (DEGENERATIVE DISC DISEASE), CERVICAL: ICD-10-CM

## 2018-02-09 RX ORDER — TRAMADOL HYDROCHLORIDE 50 MG/1
100 TABLET ORAL EVERY 8 HOURS PRN
Qty: 180 TABLET | Refills: 1 | Status: SHIPPED | OUTPATIENT
Start: 2018-02-09 | End: 2018-08-30

## 2018-02-14 ENCOUNTER — TELEPHONE (OUTPATIENT)
Dept: FAMILY MEDICINE | Facility: CLINIC | Age: 83
End: 2018-02-14

## 2018-02-14 NOTE — TELEPHONE ENCOUNTER
----- Message from Liliana Houser sent at 2/14/2018 10:13 AM CST -----  Contact: patient  Patient Martha Peres, [phone] is requesting a callback from the nurse.  Patient went to the ER at [facility] for her lower back, patient was given an injection and pill, but is not getting any relief.  Please advise.  Thanks!

## 2018-02-14 NOTE — TELEPHONE ENCOUNTER
Patient prescribed Lidocaine 5% patch for back pain.   She would like to know if it is ok to continue using patches until her appt with pain management (Murali) 2/22/2018. Please advise.

## 2018-02-22 ENCOUNTER — OFFICE VISIT (OUTPATIENT)
Dept: PAIN MEDICINE | Facility: CLINIC | Age: 83
End: 2018-02-22
Payer: MEDICARE

## 2018-02-22 VITALS
RESPIRATION RATE: 20 BRPM | HEART RATE: 60 BPM | SYSTOLIC BLOOD PRESSURE: 150 MMHG | DIASTOLIC BLOOD PRESSURE: 60 MMHG | TEMPERATURE: 98 F

## 2018-02-22 DIAGNOSIS — M54.16 LUMBAR RADICULOPATHY: ICD-10-CM

## 2018-02-22 DIAGNOSIS — M51.36 DDD (DEGENERATIVE DISC DISEASE), LUMBAR: Primary | ICD-10-CM

## 2018-02-22 PROCEDURE — 99214 OFFICE O/P EST MOD 30 MIN: CPT | Mod: PBBFAC,PN | Performed by: PHYSICIAN ASSISTANT

## 2018-02-22 PROCEDURE — 99999 PR PBB SHADOW E&M-EST. PATIENT-LVL IV: CPT | Mod: PBBFAC,,, | Performed by: PHYSICIAN ASSISTANT

## 2018-02-22 PROCEDURE — 99213 OFFICE O/P EST LOW 20 MIN: CPT | Mod: S$PBB,,, | Performed by: PHYSICIAN ASSISTANT

## 2018-02-22 PROCEDURE — 1159F MED LIST DOCD IN RCRD: CPT | Mod: ,,, | Performed by: PHYSICIAN ASSISTANT

## 2018-02-22 PROCEDURE — 1125F AMNT PAIN NOTED PAIN PRSNT: CPT | Mod: ,,, | Performed by: PHYSICIAN ASSISTANT

## 2018-02-22 RX ORDER — HYDROCODONE BITARTRATE AND ACETAMINOPHEN 5; 325 MG/1; MG/1
1 TABLET ORAL 2 TIMES DAILY PRN
Qty: 30 TABLET | Refills: 0 | Status: SHIPPED | OUTPATIENT
Start: 2018-02-22 | End: 2018-05-28 | Stop reason: SDUPTHER

## 2018-02-26 ENCOUNTER — TELEPHONE (OUTPATIENT)
Dept: PAIN MEDICINE | Facility: CLINIC | Age: 83
End: 2018-02-26

## 2018-02-26 DIAGNOSIS — G25.0 ESSENTIAL TREMOR: ICD-10-CM

## 2018-02-26 NOTE — TELEPHONE ENCOUNTER
----- Message from Sudha Soto sent at 2/26/2018  8:57 AM CST -----  Contact: patient   Patient calling to speak to the Nurse about her prescription for Norco. She has a few questions. Please advise.   Call back    Thanks!

## 2018-02-26 NOTE — TELEPHONE ENCOUNTER
Spoke with patient. She stated she is taking the the hydrocodone twice a day as prescribed and it is not working for her. Please advise.

## 2018-02-26 NOTE — PROGRESS NOTES
CHIEF COMPLAINT/REASON FOR VISIT: left leg pain    HPI: The patient is an 85-year-old Female with a history of breast cancer, hypertension, hypothyroidism and complaints of bilateral posterior neck and shoulder pain, worse on the left.  She is status post caudal epidural steroid injection on 1/2/18 with 0% relief.  She denies any major low back pain but complains of severe pain in the left buttock and radiating throughout the left leg and foot.  The pain is constant, not significantly worse with anything.  She does have some relief with pain medication.  She denies having numbness, bladder or bowel incontinence.  She denies changes to her left-sided weakness.    Pain intervention history: She is only taking hydrocodone-acetaminophen 10/500 with mild relief. She tried Flexeril at nighttime without much relief. She undergone physical therapy but felt like it made her worse. She is now status post cervical epidural steroid injection on 3/2/11 and 5/18/11 now with greater than 95% relief on going. She is status post cervical MYLES on 7/30/13 and again on 8/27/13 with 75% relief.  She is status post C7-T1 cervical interlaminar epidural steroid injection on 11/18/13 with greater than 50% relief of her neck pain.  She is status post C7-T1 cervical interlaminar epidural steroid injection on 1/20/14 with 90% relief in her neck and 100% relief in her arms. She is status post C7-T1 cervical interlaminar epidural steroid injection on 8/15/14 with 50% relief of her pain.  She is status post C7-T1 cervical interlaminar epidural steroid injection on 9/2/15 with 100% relief.  She is status post L5/S1 interlaminar epidural steroid injection to the right on 10/7/15 with 100% relief of her low back and right leg pain.  She is status post C7-T1 cervical interlaminar epidural steroid injection on 12/22/15 with less than 50% relief.  She is status post C7-T1 cervical interlaminar epidural steroid injection on 7/8/16 with moderate relief.   She is status post caudal epidural steroid injection on 10/28/16 with about 50% relief.  She is status post caudal epidural steroid injection on 18 with 0% relief.    Review of systems: Patient reports neck pain and left shoulder pain.  Balance of review of systems is negative.    Medical, surgical, family and social history reviewed elsewhere in record.     Medications/Allergies: See med card    Vitals:    18 1139   BP: (!) 150/60   Pulse: 60   Resp: 20   Temp: 97.8 °F (36.6 °C)   TempSrc: Oral   PainSc:   6   PainLoc: Leg       PHYSICAL EXAM:  Gen: A and O x3, pleasant, well-groomed  Skin: No rashes or obvious lesions  HEENT: PERRLA   CVS: Regular rate and rhythm, normal S1 and S2, no murmurs.  Resp: Clear to auscultation bilaterally, no wheezes or rales.  Abdomen: Soft, NT/ND, normal bowel sounds present.  Musculoskeletal:  Presents in wheelchair today, unable to move left arm, able to slightly move left leg     Neuro:  Lower extremities: 5/5 strength right side, 2/5 left side   Reflexes:  Patellar 0+, Achilles 0+.  Sensory: Intact and symmetrical to light touch and pinprick in C2-T1 dermatomes on the right, mild decrease sensation on the left. Intact and symmetrical to light touch and pinprick in L2-S1 dermatomes on the right, mild decrease sensation on the left.    Lumbar spine:  Lumbar spine: Unable to stand without assistance due to past CVA and left-sided weakness.  Catracho's test is deferred.  Straight leg raise causes left buttock and left leg pain.   Internal and external rotation of the hip causes no increased pain on either side.  Myofascial exam: Mild tenderness to palpation across the lumbar paraspinous muscles.      IMAGIN12 MRI C-spine  C2/3 there is no evidence of disk protrusion, canal or foraminal stenosis.  C3/4: There is annular disk bulge and small posterior canal ligamentous hypertrophy which combine to cause moderate canal distortion. The cord does not appear contacted.  This is not significant changed relative to the prior exam.  C4/5: There is a small central disk protrusion this combines with some mild posterior canal ligamentous hypertrophy to produce moderate canal stenosis. The cord does not appear contacted in the AP canal measures approximately 11 mm the foramina are intact.  C5/6: Moderate left and mild to moderate right-sided uncal for T. both induced neural foraminal narrowing is noted. There slight disk bulging with Vaughn mild AP canal distortion.  C6/7: There is a mild posterior canal at mid is hypertrophy and mild left greater than right uncovertebral induced neural foraminal narrowing. The central canal is only slightly distorted.  C7/T1: There is slight disk bulging without evidence of significant canal or foraminal stenosis.    1/13/14 MRI cervical spine  At the C2-C3 level, minimal disk bulge of one to 2 mm may be noted. No significant central canal stenosis, or foraminal narrowing, or detrimental change is noted  At the C3-C4 level, mild broad-based bulging is noted of approximately 2 mm, minimal central canal narrowing and neuroforaminal narrowing is noted. No convincing detrimental change is noted since the prior.  At the C4-C5 level, minimal broad-based bulging is noted centrally at one to 2 mm no convincing detrimental change since the prior exam. Mild central canal narrowing is noted. Mild right greater than left nerve foraminal narrowing is noted. No convincing the detrimental change is noted since the prior. Facet arthropathy is noted bilaterally.  At the C5-C6 level uncovertebral spurring is noted bilaterally that may relate to disk osteophyte and protrusion towards each nerve foramen of two to 3 mm. Facet arthropathy is noted bilaterally. Mild bilateral neuroforamina narrowing is noted. Minimal central canal narrowing is noted. No convincing detrimental change is noted.  At the C6-C7 level, uncovertebral spurring appears to be present bilaterally.  Broad-based disk osteophyte bulging may be noted to each neuroforamen of one to 2 mm. Mild bilateral nerve foraminal narrowing is noted. No significant central canal narrowing is noted. A similar appearance is noted on the prior  At the C7-T1 level, minimal disk bulge may be noted and most, no significant central canal stenosis or nerve foraminal stenosis is noted. No convincing detrimental changes noted.    6/15/15 MRI lumbar spine  T12-L1: No central canal or neuroforaminal stenosis. No disc protrusion or extrusion. There is ligamentum flavum thickening and facet arthropathy.  L1-L2: There is minor facet arthropathy. There is an annular tear of the central portion of the intervertebral disc. No central canal or neuroforaminal stenosis. No disc protrusion or extrusion.  L2-L3: There is ligamentum flavum thickening and facet arthropathy. No central canal or neuroforaminal stenosis. No disc protrusion or extrusion.  L3-L4: There is a broad-based bulge which extends into both neuroforamina. There is ligamentum flavum thickening and facet arthropathy. No central canal or neuroforaminal stenosis. No disc protrusion or extrusion.  L4-L5: There is bilateral facet arthropathy. No central canal or neuroforaminal stenosis. No disc protrusion or extrusion.  L5-S1: There is a grade II anterolisthesis of L5 on S1. There is severe bilateral facet arthropathy. There is ligamentum flavum thickening. There is at least moderate left neuroforaminal stenosis. No right neuroforaminal stenosis. No central canal stenosis.       ASSESSMENT:  The patient is an 85-year-old Female with a history of breast cancer, hypertension, hypothyroidism and complaints of bilateral posterior neck and shoulder pain, worse on the left.      1. DDD (degenerative disc disease), lumbar  MRI Lumbar Spine Without Contrast   2. Lumbar radiculopathy         Plan:  1.  She did not have any relief following the caudal MYLES so I will update her lumbar spine MRI for  further evaluation.  2.  Dr. Carrion provided a prescription for hydrocodone-acetaminophen 5/325 mg #30.  3.  We will call her with results and recommendations.    Greater than 50% of this 15 minute visit was spent on counseling patient.

## 2018-02-27 RX ORDER — GABAPENTIN 300 MG/1
CAPSULE ORAL
Qty: 90 CAPSULE | Refills: 0 | Status: SHIPPED | OUTPATIENT
Start: 2018-02-27 | End: 2018-04-03 | Stop reason: SDUPTHER

## 2018-02-27 RX ORDER — BRIMONIDINE TARTRATE 2 MG/ML
SOLUTION/ DROPS OPHTHALMIC
Qty: 10 ML | Refills: 3 | Status: SHIPPED | OUTPATIENT
Start: 2018-02-27 | End: 2018-10-22 | Stop reason: SDUPTHER

## 2018-02-28 ENCOUNTER — TELEPHONE (OUTPATIENT)
Dept: FAMILY MEDICINE | Facility: CLINIC | Age: 83
End: 2018-02-28

## 2018-02-28 ENCOUNTER — TELEPHONE (OUTPATIENT)
Dept: PAIN MEDICINE | Facility: CLINIC | Age: 83
End: 2018-02-28

## 2018-02-28 ENCOUNTER — TELEPHONE (OUTPATIENT)
Dept: OPHTHALMOLOGY | Facility: CLINIC | Age: 83
End: 2018-02-28

## 2018-02-28 NOTE — TELEPHONE ENCOUNTER
Will discuss this with her to determine exact needs and order the appropriate service at her appointment.

## 2018-02-28 NOTE — TELEPHONE ENCOUNTER
----- Message from Margaret Le sent at 2/28/2018  8:43 AM CST -----  Contact: Patient  Martha, patient [phone] calling to speak with office regarding possible orders for home health. Please advise. Thanks.

## 2018-02-28 NOTE — TELEPHONE ENCOUNTER
----- Message from Maria Esther Pa sent at 2/27/2018  3:45 PM CST -----  Patient is calling to talk to Beatriz about hydrocodone-acetaminophen 5-325mg (NORCO) 5-325 mg per tablet. The office was supposed to check with the doctor to see if he wanted her to increase her dosage of this medication. Please call back to advise at [phone].      [facility]- [address] [address]  Phone: [phone] Fax: [phone]

## 2018-02-28 NOTE — TELEPHONE ENCOUNTER
Spoke with pt, states she was told by a medicare representative that they would pay for someone to be at her home to take care of her since she qualifies for it.   Currently she pays someone out of her own pocket to come bathe her 3 times per week.   Pt requesting if she can get orders or whatever she may need for the services.   States she does have an appt next week and could wait till then to discuss with you if preferred.

## 2018-03-01 DIAGNOSIS — M54.16 LUMBAR RADICULOPATHY: ICD-10-CM

## 2018-03-01 RX ORDER — HYDROCODONE BITARTRATE AND ACETAMINOPHEN 5; 325 MG/1; MG/1
1 TABLET ORAL 2 TIMES DAILY PRN
Qty: 30 TABLET | Refills: 0 | Status: CANCELLED | OUTPATIENT
Start: 2018-03-01

## 2018-03-01 RX ORDER — HYDROCODONE BITARTRATE AND ACETAMINOPHEN 10; 325 MG/1; MG/1
1 TABLET ORAL 2 TIMES DAILY PRN
Qty: 30 TABLET | Refills: 0 | Status: SHIPPED | OUTPATIENT
Start: 2018-03-01 | End: 2018-03-12 | Stop reason: SDUPTHER

## 2018-03-01 NOTE — TELEPHONE ENCOUNTER
----- Message from Traci Martin sent at 3/1/2018  8:15 AM CST -----  Contact: Self  Patient is requesting a refill of her hydrocodone-acetaminophen 5-325mg (NORCO) 5-325 mg per tablet.  Thank you!    [facility]- [address] [address]  Phone: [phone] Fax: [phone]

## 2018-03-03 ENCOUNTER — HOSPITAL ENCOUNTER (OUTPATIENT)
Dept: RADIOLOGY | Facility: HOSPITAL | Age: 83
Discharge: HOME | End: 2018-03-03
Attending: PHYSICIAN ASSISTANT
Payer: MEDICARE

## 2018-03-03 DIAGNOSIS — M51.36 DDD (DEGENERATIVE DISC DISEASE), LUMBAR: ICD-10-CM

## 2018-03-03 PROCEDURE — 72148 MRI LUMBAR SPINE W/O DYE: CPT | Mod: 26,,, | Performed by: RADIOLOGY

## 2018-03-03 PROCEDURE — 72148 MRI LUMBAR SPINE W/O DYE: CPT | Mod: TC,PO

## 2018-03-06 ENCOUNTER — TELEPHONE (OUTPATIENT)
Dept: PAIN MEDICINE | Facility: CLINIC | Age: 83
End: 2018-03-06

## 2018-03-06 NOTE — TELEPHONE ENCOUNTER
Spoke with the patient regarding the recommendation. She wants to think about it and will call the office back to schedule.

## 2018-03-06 NOTE — TELEPHONE ENCOUNTER
----- Message from Elvis Wilder sent at 3/6/2018 10:34 AM CST -----  Contact: patient  Patient called requesting test results. Call back at [phone]. Thanks,

## 2018-03-06 NOTE — TELEPHONE ENCOUNTER
Please let the patient know that I reviewed her lumbar spine MRI and there have been no significant changes.  We'll try a different approach with a left L5/S1 transforaminal epidural steroid injection with 3 week follow-up.

## 2018-03-06 NOTE — TELEPHONE ENCOUNTER
----- Message from Dory Gagnon sent at 3/6/2018 12:34 PM CST -----  Contact: Patient  Patient is calling for the results of her MRI that she had done.  Call Back#[phone]  Thanks

## 2018-03-12 ENCOUNTER — TELEPHONE (OUTPATIENT)
Dept: PAIN MEDICINE | Facility: CLINIC | Age: 83
End: 2018-03-12

## 2018-03-12 RX ORDER — HYDROCODONE BITARTRATE AND ACETAMINOPHEN 10; 325 MG/1; MG/1
1 TABLET ORAL 2 TIMES DAILY PRN
Qty: 60 TABLET | Refills: 0 | Status: SHIPPED | OUTPATIENT
Start: 2018-03-12 | End: 2018-04-10 | Stop reason: SDUPTHER

## 2018-03-12 NOTE — TELEPHONE ENCOUNTER
Spoke with patient. She is asking for a refill on hydrocodone. Patient stated she had a few left and also stated she has been taking this twice a day. She also stated she is thinking about having procedure done but needs to wait until she can get a ride.

## 2018-03-12 NOTE — TELEPHONE ENCOUNTER
----- Message from Dwayne Palumbo sent at 3/12/2018 11:33 AM CDT -----  Contact: Martha  Calling back for Beatriz. Please call her [phone] (home)   thanks

## 2018-04-03 DIAGNOSIS — G25.0 ESSENTIAL TREMOR: ICD-10-CM

## 2018-04-03 RX ORDER — GABAPENTIN 300 MG/1
CAPSULE ORAL
Qty: 90 CAPSULE | Refills: 0 | Status: ON HOLD | OUTPATIENT
Start: 2018-04-03 | End: 2018-07-02 | Stop reason: HOSPADM

## 2018-04-10 RX ORDER — HYDROCODONE BITARTRATE AND ACETAMINOPHEN 10; 325 MG/1; MG/1
1 TABLET ORAL 2 TIMES DAILY PRN
Qty: 60 TABLET | Refills: 0 | Status: SHIPPED | OUTPATIENT
Start: 2018-04-10 | End: 2018-04-25 | Stop reason: SDUPTHER

## 2018-04-24 RX ORDER — DORZOLAMIDE HCL 20 MG/ML
SOLUTION/ DROPS OPHTHALMIC
Qty: 10 ML | Refills: 1 | Status: SHIPPED | OUTPATIENT
Start: 2018-04-24 | End: 2018-10-22 | Stop reason: SDUPTHER

## 2018-04-25 RX ORDER — HYDROCODONE BITARTRATE AND ACETAMINOPHEN 10; 325 MG/1; MG/1
1 TABLET ORAL 2 TIMES DAILY PRN
Qty: 60 TABLET | Refills: 0 | Status: SHIPPED | OUTPATIENT
Start: 2018-04-25 | End: 2018-05-25

## 2018-04-25 NOTE — TELEPHONE ENCOUNTER
Patient calling for refill of Hydrocodone states that she is having lower back and hip pain. Also states that right arm pain woke her up during the night.

## 2018-04-26 NOTE — TELEPHONE ENCOUNTER
Spoke with the patient and she will check with her son's schedule and call back to make the appointment.

## 2018-04-30 DIAGNOSIS — G25.0 ESSENTIAL TREMOR: ICD-10-CM

## 2018-05-01 RX ORDER — GABAPENTIN 300 MG/1
300 CAPSULE ORAL 3 TIMES DAILY
Qty: 90 CAPSULE | Refills: 0 | Status: SHIPPED | OUTPATIENT
Start: 2018-05-01 | End: 2018-06-06 | Stop reason: SDUPTHER

## 2018-05-01 RX ORDER — GABAPENTIN 300 MG/1
CAPSULE ORAL
Qty: 90 CAPSULE | OUTPATIENT
Start: 2018-05-01

## 2018-05-01 NOTE — TELEPHONE ENCOUNTER
The patient has not been seen since 2016.  This refill has been approved, but follow up will be necessary for further refills.

## 2018-05-07 RX ORDER — ATORVASTATIN CALCIUM 10 MG/1
TABLET, FILM COATED ORAL
Qty: 30 TABLET | Refills: 11 | Status: SHIPPED | OUTPATIENT
Start: 2018-05-07

## 2018-05-17 DIAGNOSIS — M50.30 DDD (DEGENERATIVE DISC DISEASE), CERVICAL: ICD-10-CM

## 2018-05-18 RX ORDER — TRAMADOL HYDROCHLORIDE 50 MG/1
100 TABLET ORAL EVERY 8 HOURS PRN
Qty: 180 TABLET | OUTPATIENT
Start: 2018-05-18

## 2018-05-28 DIAGNOSIS — M54.16 LUMBAR RADICULOPATHY: ICD-10-CM

## 2018-05-28 RX ORDER — HYDROCODONE BITARTRATE AND ACETAMINOPHEN 5; 325 MG/1; MG/1
1 TABLET ORAL 2 TIMES DAILY PRN
Qty: 30 TABLET | Refills: 0 | Status: SHIPPED | OUTPATIENT
Start: 2018-05-28 | End: 2018-06-30

## 2018-05-28 NOTE — TELEPHONE ENCOUNTER
----- Message from Cristy Nolan sent at 5/28/2018  9:41 AM CDT -----  Type:  RX Refill Request    Who Called:  Patient   Refill or New Rx: Refilll  RX Name and Strength:  hydrocodone-acetaminophen 5-325mg (NORCO) 5-325 mg per tablet  How is the patient currently taking it? (ex. 1XDay):  As needed  Is this a 30 day or 90 day RX:  30 day  Preferred Pharmacy with phone number:    [address] [address]  Phone: [phone] Fax: [phone]  Local or Mail Order:  local  Ordering Provider:  Darinel Aleman Call Back Number:  [phone]  Additional Information:

## 2018-05-30 ENCOUNTER — TELEPHONE (OUTPATIENT)
Dept: PAIN MEDICINE | Facility: CLINIC | Age: 83
End: 2018-05-30

## 2018-05-30 NOTE — TELEPHONE ENCOUNTER
----- Message from Misty Escobar sent at 5/30/2018 11:40 AM CDT -----  Type:  RX Refill Request    Who Called: patient  Refill or New Rx:  refill  RX Name and Strength:  HYDROcodone-acetaminophen (NORCO) 5-325 mg per tablet  How is the patient currently taking it? (ex. 1XDay):  Na  Is this a 30 day or 90 day RX:  30  Preferred Pharmacy with phone number:   [address] [address]  Phone: [phone] Fax: [phone]  Local or Mail Order:  local  Ordering Provider:  Murali Aleman Call Back Number:  [phone] (home)     Additional Information: Sent a message yesterday regarding

## 2018-06-04 ENCOUNTER — TELEPHONE (OUTPATIENT)
Dept: PAIN MEDICINE | Facility: CLINIC | Age: 83
End: 2018-06-04

## 2018-06-04 NOTE — TELEPHONE ENCOUNTER
Spoke with the patient and she is hurting severe in the lower back and the pain medication is not helping. She is not able to come in due to her son is working over time right now and has no way to get here. Wanting to know what to do because the pain medication is not helping.   Please advise.

## 2018-06-05 RX ORDER — HYDROCODONE BITARTRATE AND ACETAMINOPHEN 10; 325 MG/1; MG/1
1 TABLET ORAL 4 TIMES DAILY PRN
Qty: 60 TABLET | Refills: 0 | Status: SHIPPED | OUTPATIENT
Start: 2018-06-05 | End: 2018-06-30

## 2018-06-05 NOTE — TELEPHONE ENCOUNTER
Spoke to patient and she states that she has been taking two at a time since she got the prescription, she states that she only has 3 or 4 pills left. She states that taking 2 at a time is more helpful in controlling her pain than taking one. She also stated that she is working on getting transportation lined up so she can get an injection.

## 2018-06-05 NOTE — TELEPHONE ENCOUNTER
----- Message from Agueda Baxter sent at 6/5/2018  8:55 AM CDT -----  Contact: self [phone]  She is calling to follow up on her call of yesterday.  Please call her.  Thank you!

## 2018-06-06 RX ORDER — GABAPENTIN 300 MG/1
300 CAPSULE ORAL 3 TIMES DAILY
Qty: 90 CAPSULE | Refills: 1 | Status: SHIPPED | OUTPATIENT
Start: 2018-06-06 | End: 2018-08-09 | Stop reason: SDUPTHER

## 2018-06-15 ENCOUNTER — TELEPHONE (OUTPATIENT)
Dept: PAIN MEDICINE | Facility: CLINIC | Age: 83
End: 2018-06-15

## 2018-06-15 NOTE — TELEPHONE ENCOUNTER
Spoke with patient. She is requesting a refill on her hydrocodone. Spoke with patient about this because this was just filled 10 days ago and the pharmacy was called to confirm this. She stated that she typically only takes two of these a day so she still should have some left. She states she is out of medication. She stated no one has access to her medications. She is still in severe back pain and I gave her some dates for procedure to check with her son. Please advise.

## 2018-06-15 NOTE — TELEPHONE ENCOUNTER
If she is out and she is not taking 6 tablets daily, somebody is taking her medication from her and she needs to figure this out or I cannot prescribe to her.

## 2018-06-15 NOTE — TELEPHONE ENCOUNTER
----- Message from Yolanda Robertson sent at 6/15/2018  8:14 AM CDT -----  Contact: self  Patient needing to speak to nurse    Please call [phone] (home)

## 2018-06-15 NOTE — TELEPHONE ENCOUNTER
Spoke to patient regarding this. She again stated no one has access to her medication and that no one helps her with medication management. I informed patient Dr. Carrion can not prescribe anymore medication until she figures out what happened. She understood and verbalized understanding.

## 2018-06-18 ENCOUNTER — TELEPHONE (OUTPATIENT)
Dept: PAIN MEDICINE | Facility: CLINIC | Age: 83
End: 2018-06-18

## 2018-06-18 DIAGNOSIS — M54.16 LUMBAR RADICULOPATHY: Primary | ICD-10-CM

## 2018-06-18 RX ORDER — ALPRAZOLAM 0.5 MG/1
1 TABLET, ORALLY DISINTEGRATING ORAL ONCE AS NEEDED
Status: CANCELLED | OUTPATIENT
Start: 2018-07-20 | End: 2018-07-20

## 2018-06-18 NOTE — TELEPHONE ENCOUNTER
----- Message from Maria Esther Pa sent at 6/18/2018  1:16 PM CDT -----  Type: Needs Medical Advice    Who Called:  Patient  Best Call Back Number: [phone]  Additional Information: Patient is calling to talk to Beatriz about getting injections in her lower back. Please call to advise.

## 2018-06-18 NOTE — TELEPHONE ENCOUNTER
----- Message from Traci Martin sent at 6/18/2018  3:54 PM CDT -----  Contact: Self  Patient is requesting a call back from Rhonda, she is a little confused and wants to go over what she discussed with you a short time ago.   Call back at [phone] (home).  Thank

## 2018-06-18 NOTE — TELEPHONE ENCOUNTER
Patient is scheduled for a transforaminal steroid injection on 7/20 and will need to stop Plavix and aspirin 7 days before. Please advise if this is okay. Thanks

## 2018-06-18 NOTE — TELEPHONE ENCOUNTER
Patient states that she feels like she needs to urinate but cannot go as much. States that if she drinks caffeine or too much acidic drinks it gets worse. Advised to to cut back on those types of drinks. Offered patient sooner appt but patient refused because she will be seen on 6/20/18. Please advise.

## 2018-06-18 NOTE — TELEPHONE ENCOUNTER
Spoke with patient. Left L5/S1 transforaminal epidural steroid injection scheduled for 7/20 with 3 week follow up. Pre-op instructions reviewed and sent to patient.

## 2018-06-30 PROBLEM — Z86.73 HISTORY OF CVA (CEREBROVASCULAR ACCIDENT): Status: ACTIVE | Noted: 2018-06-30

## 2018-06-30 PROBLEM — D64.9 SYMPTOMATIC ANEMIA: Status: ACTIVE | Noted: 2018-06-30

## 2018-06-30 PROBLEM — M25.511 RIGHT SHOULDER PAIN: Status: ACTIVE | Noted: 2018-06-30

## 2018-06-30 PROBLEM — I50.32 CHRONIC DIASTOLIC HEART FAILURE: Status: ACTIVE | Noted: 2018-06-30

## 2018-06-30 PROBLEM — I25.10 CAD (CORONARY ARTERY DISEASE): Status: ACTIVE | Noted: 2018-06-30

## 2018-06-30 PROBLEM — I50.1 PULMONARY EDEMA CARDIAC CAUSE: Status: ACTIVE | Noted: 2018-06-30

## 2018-06-30 PROBLEM — I21.4 NSTEMI (NON-ST ELEVATED MYOCARDIAL INFARCTION): Status: ACTIVE | Noted: 2018-06-30

## 2018-07-06 ENCOUNTER — TELEPHONE (OUTPATIENT)
Dept: PAIN MEDICINE | Facility: CLINIC | Age: 83
End: 2018-07-06

## 2018-07-06 ENCOUNTER — OUTPATIENT CASE MANAGEMENT (OUTPATIENT)
Dept: ADMINISTRATIVE | Facility: OTHER | Age: 83
End: 2018-07-06

## 2018-07-06 RX ORDER — HYDROCODONE BITARTRATE AND ACETAMINOPHEN 10; 325 MG/1; MG/1
1 TABLET ORAL EVERY 6 HOURS PRN
Qty: 60 TABLET | Refills: 0 | Status: SHIPPED | OUTPATIENT
Start: 2018-07-06 | End: 2018-08-02 | Stop reason: SDUPTHER

## 2018-07-06 NOTE — TELEPHONE ENCOUNTER
Spoke with the patient and she has just got out of the hospital and will be doing home health physical therapy. She has injection the 20 th of the month. Is asking to see if she can get pain medication to help with the physical therapy. Patient stated that the therapist recommended her to take pain medication if she had it 30 minutes before her therapy sessions. Please advise.

## 2018-07-06 NOTE — PROGRESS NOTES
7/6/18-The following patient has been assigned to Luis Escobar RN with Outpatient Complex Care Management for high risk screening.    Reason for referral: High risk patient with recent hospital discharge

## 2018-07-06 NOTE — TELEPHONE ENCOUNTER
----- Message from Guera Lawson sent at 7/6/2018 10:42 AM CDT -----  Contact: Martha Peres (pt)  Mrs Peres is requesting a call back from nurse (Beatriz) to discuss some pain issues she's having. Mrs. Peres states she is about to start therapy and will need some pain medication(Hydrocodone)  to help her while in therapy. Please call to advise.  Call back   Thanks    ..  [address] [address]  Phone: [phone] Fax: [phone]

## 2018-07-09 ENCOUNTER — LAB VISIT (OUTPATIENT)
Dept: LAB | Facility: HOSPITAL | Age: 83
End: 2018-07-09
Attending: INTERNAL MEDICINE
Payer: MEDICARE

## 2018-07-09 DIAGNOSIS — D64.9 ANEMIA, UNSPECIFIED: Primary | ICD-10-CM

## 2018-07-09 LAB
BASOPHILS # BLD AUTO: 0.19 K/UL
BASOPHILS NFR BLD: 2.7 %
DIFFERENTIAL METHOD: ABNORMAL
EOSINOPHIL # BLD AUTO: 0.4 K/UL
EOSINOPHIL NFR BLD: 5.8 %
ERYTHROCYTE [DISTWIDTH] IN BLOOD BY AUTOMATED COUNT: 18.9 %
HCT VFR BLD AUTO: 37.7 %
HGB BLD-MCNC: 10.5 G/DL
IMM GRANULOCYTES # BLD AUTO: 0.02 K/UL
IMM GRANULOCYTES NFR BLD AUTO: 0.3 %
LYMPHOCYTES # BLD AUTO: 1.8 K/UL
LYMPHOCYTES NFR BLD: 25.2 %
MCH RBC QN AUTO: 23.1 PG
MCHC RBC AUTO-ENTMCNC: 27.9 G/DL
MCV RBC AUTO: 83 FL
MONOCYTES # BLD AUTO: 0.5 K/UL
MONOCYTES NFR BLD: 7.1 %
NEUTROPHILS # BLD AUTO: 4.2 K/UL
NEUTROPHILS NFR BLD: 58.9 %
NRBC BLD-RTO: 0 /100 WBC
PLATELET # BLD AUTO: 307 K/UL
PMV BLD AUTO: 10.5 FL
RBC # BLD AUTO: 4.54 M/UL
WBC # BLD AUTO: 7.07 K/UL

## 2018-07-09 PROCEDURE — 85025 COMPLETE CBC W/AUTO DIFF WBC: CPT

## 2018-07-13 ENCOUNTER — TELEPHONE (OUTPATIENT)
Dept: ADMINISTRATIVE | Facility: CLINIC | Age: 83
End: 2018-07-13

## 2018-07-18 ENCOUNTER — TELEPHONE (OUTPATIENT)
Dept: PAIN MEDICINE | Facility: CLINIC | Age: 83
End: 2018-07-18

## 2018-07-18 DIAGNOSIS — M51.36 DDD (DEGENERATIVE DISC DISEASE), LUMBAR: Primary | ICD-10-CM

## 2018-07-18 NOTE — TELEPHONE ENCOUNTER
----- Message from Navid Zaldivar sent at 7/18/2018 12:05 PM CDT -----  Contact: self   Patient need to speak with a nurse regarding her upcoming procedure, please call back at [phone] (home)

## 2018-07-19 ENCOUNTER — TELEPHONE (OUTPATIENT)
Dept: PAIN MEDICINE | Facility: CLINIC | Age: 83
End: 2018-07-19

## 2018-07-19 ENCOUNTER — TELEPHONE (OUTPATIENT)
Dept: SURGERY | Facility: HOSPITAL | Age: 83
End: 2018-07-19

## 2018-07-19 NOTE — TELEPHONE ENCOUNTER
When doing pre op call, patient stated that she had not stopped her aspirin or plavix. Will she need to be rescheduled?

## 2018-07-19 NOTE — TELEPHONE ENCOUNTER
----- Message from Lisa Wahl sent at 7/19/2018  9:42 AM CDT -----  Type: Needs Medical Advice    Who Called:  Self   Symptoms (please be specific):  NA  How long has patient had these symptoms:  DANIEL  Pharmacy name and phone #:  [facility] Call Back Number: [phone]  Additional Information: Patient returning the nurse call

## 2018-07-20 ENCOUNTER — TELEPHONE (OUTPATIENT)
Dept: FAMILY MEDICINE | Facility: CLINIC | Age: 83
End: 2018-07-20

## 2018-07-20 ENCOUNTER — OUTPATIENT CASE MANAGEMENT (OUTPATIENT)
Dept: ADMINISTRATIVE | Facility: OTHER | Age: 83
End: 2018-07-20

## 2018-07-20 DIAGNOSIS — R30.0 DYSURIA: Primary | ICD-10-CM

## 2018-07-20 NOTE — TELEPHONE ENCOUNTER
----- Message from Azul Nickerson sent at 7/20/2018  1:09 PM CDT -----  Contact: Berna home health nurse   Type: Needs Medical Advice    Who Called:  Berna   Symptoms (please be specific):  n/a  How long has patient had these symptoms:  n/a  Pharmacy name and phone #:  n/a  Best Call Back Number:    Additional Information:  Pt called nurse complaining of symptoms of a UTI. Nurse is on her way to assess. Please call back and advise.

## 2018-07-20 NOTE — PROGRESS NOTES
7/20/18-1st Attempt to complete initial assessment for Outpatient Care Management. Spoke with patient, who reported that she was unable to complete the assessment at this time. Will attempt to complete assessment at a later date.

## 2018-07-20 NOTE — TELEPHONE ENCOUNTER
Spoke with nurse and she states that she has a scheduled visit with patient but is c/o dysuria and strong odor. Thinks she has a UTI. Requesting orders. Orders in for UA. Culture is already in and can be used.

## 2018-07-20 NOTE — TELEPHONE ENCOUNTER
Patient calling reports of strong urine possible UTI will get home health nurse to possible get an order

## 2018-07-20 NOTE — TELEPHONE ENCOUNTER
----- Message from Cristy Nolan sent at 7/20/2018 11:00 AM CDT -----  Contact: Patient  Type: Needs Medical Advice    Who Called:  Patient  Symptoms (please be specific):  UTI- strong odor  How long has patient had these symptoms:  today  Pharmacy name and phone #:    [facility]- [address] [address]  Phone: [phone] Fax: [phone]      Best Call Back Number: [phone]  Additional Information:

## 2018-07-23 ENCOUNTER — OUTPATIENT CASE MANAGEMENT (OUTPATIENT)
Dept: ADMINISTRATIVE | Facility: OTHER | Age: 83
End: 2018-07-23

## 2018-07-23 NOTE — PROGRESS NOTES
7/23/18-2nd attempt to complete initial assessment for OPCM. No answer. Unable to leave message. Will attempt to contact at a later date.

## 2018-07-27 ENCOUNTER — OUTPATIENT CASE MANAGEMENT (OUTPATIENT)
Dept: ADMINISTRATIVE | Facility: OTHER | Age: 83
End: 2018-07-27

## 2018-07-27 ENCOUNTER — TELEPHONE (OUTPATIENT)
Dept: FAMILY MEDICINE | Facility: CLINIC | Age: 83
End: 2018-07-27

## 2018-07-27 NOTE — PROGRESS NOTES
7/27/18  Summary: RN CM called patient to complete initial assessment for OPCM. Patient is a 84yo female with a history of DDD, History of CVA in 2016, CAD, hypertension, heart failure, hypothyroidism, and osteoporosis. Patient stated that she is wheelchair bound and lives with her daughter, Jolie Corbin. Patient reported that she does need assistance with ADLs and IADLs. Reported that her family has hired a caregiver to assist with bathing patient, stated that her daughter assist with cooking, cleaning and doing the patient's laundry. Patient stated that her son assist with medication management, reported that he organizes her pillbox once a week.  Denies any difficulty affording her medications. Patient reported that she does not have any other financial struggle at this time. Patient does not drive and relies on her son for transportation to and from medical appts. Patient reported that at this time she is receiving HH services through [facility]. Stated that she does have a RN, PT and OT coming to her home. Patient reported that she will be having an MYLES on Monday 7/30 for neck and shoulder pain and is unsure if [facility] will be coming for a visit, but would like to reschedule. RN CM contacted [facility] to inform of procedure on Monday and patient request to reschedule visit, they will contact the patient to reschedule the visit. Patient reported that since her stroke, she can not do for herself, which is frustrating to the patient. RN CM will enroll patient in OPCM to assist with needs. Discussed follow up call with patient who is in agreement with call in 1 week.       Intervention:  Contacted [facility] to inform about patient appt for MYLES on 7/30  Completed PHQ2, score =0  Mail resource information on stroke and advanced directives  Mail resource information on MYLES and pain management    Plan:  Complete med rec  Follow up on receipt of resource materials  Follow up on MYLES procedure  Educate patient on s/sx of stroke and importance  of seeking immediate medical attention if symptoms present  Educate on pain management and alternative therapies

## 2018-07-27 NOTE — TELEPHONE ENCOUNTER
Urine showed some normal bacteria from the vagina, but no active infection.  Recommend she increase fluid intake enough until the urine looks a light yellow. About 4 large glasses of water daily. Smell is likely related to concentrated urine from lack of fluid intake.

## 2018-07-27 NOTE — TELEPHONE ENCOUNTER
----- Message from Lisa Wahl sent at 7/27/2018  9:00 AM CDT -----  Type:  Test Results    Who Called:  Self Name of Test (Lab/Mammo/Etc):  lab  Date of Test:  07/26/2018  Ordering Provider: Dr Sheppard Where the test was performed:  [facility]  Best Call Back Number:  [phone]  Additional Information:

## 2018-07-30 ENCOUNTER — HOSPITAL ENCOUNTER (OUTPATIENT)
Facility: HOSPITAL | Age: 83
Discharge: HOME | End: 2018-07-30
Attending: ANESTHESIOLOGY | Admitting: ANESTHESIOLOGY
Payer: MEDICARE

## 2018-07-30 ENCOUNTER — HOSPITAL ENCOUNTER (OUTPATIENT)
Dept: RADIOLOGY | Facility: HOSPITAL | Age: 83
Discharge: HOME | End: 2018-07-30
Attending: ANESTHESIOLOGY
Payer: MEDICARE

## 2018-07-30 ENCOUNTER — OUTPATIENT CASE MANAGEMENT (OUTPATIENT)
Dept: ADMINISTRATIVE | Facility: OTHER | Age: 83
End: 2018-07-30

## 2018-07-30 ENCOUNTER — SURGERY (OUTPATIENT)
Age: 83
End: 2018-07-30

## 2018-07-30 VITALS
SYSTOLIC BLOOD PRESSURE: 180 MMHG | DIASTOLIC BLOOD PRESSURE: 91 MMHG | OXYGEN SATURATION: 99 % | WEIGHT: 100 LBS | TEMPERATURE: 97 F | BODY MASS INDEX: 18.4 KG/M2 | HEART RATE: 70 BPM | HEIGHT: 62 IN | RESPIRATION RATE: 16 BRPM

## 2018-07-30 DIAGNOSIS — M54.16 BILATERAL LUMBAR RADICULOPATHY: Primary | ICD-10-CM

## 2018-07-30 DIAGNOSIS — M51.36 DDD (DEGENERATIVE DISC DISEASE), LUMBAR: ICD-10-CM

## 2018-07-30 PROCEDURE — 64483 NJX AA&/STRD TFRM EPI L/S 1: CPT | Mod: PO | Performed by: ANESTHESIOLOGY

## 2018-07-30 PROCEDURE — 63600175 PHARM REV CODE 636 W HCPCS: Mod: PO | Performed by: ANESTHESIOLOGY

## 2018-07-30 PROCEDURE — 25500020 PHARM REV CODE 255: Mod: PO | Performed by: ANESTHESIOLOGY

## 2018-07-30 PROCEDURE — 76000 FLUOROSCOPY <1 HR PHYS/QHP: CPT | Mod: TC,PO

## 2018-07-30 PROCEDURE — 25000003 PHARM REV CODE 250: Mod: PO | Performed by: ANESTHESIOLOGY

## 2018-07-30 PROCEDURE — 64483 NJX AA&/STRD TFRM EPI L/S 1: CPT | Mod: LT,,, | Performed by: ANESTHESIOLOGY

## 2018-07-30 RX ORDER — BUPIVACAINE HYDROCHLORIDE 2.5 MG/ML
INJECTION, SOLUTION EPIDURAL; INFILTRATION; INTRACAUDAL
Status: DISCONTINUED | OUTPATIENT
Start: 2018-07-30 | End: 2018-07-30 | Stop reason: HOSPADM

## 2018-07-30 RX ORDER — ALPRAZOLAM 0.5 MG/1
1 TABLET, ORALLY DISINTEGRATING ORAL ONCE
Status: COMPLETED | OUTPATIENT
Start: 2018-07-30 | End: 2018-07-30

## 2018-07-30 RX ORDER — LIDOCAINE HYDROCHLORIDE 10 MG/ML
INJECTION, SOLUTION EPIDURAL; INFILTRATION; INTRACAUDAL; PERINEURAL
Status: DISCONTINUED | OUTPATIENT
Start: 2018-07-30 | End: 2018-07-30 | Stop reason: HOSPADM

## 2018-07-30 RX ORDER — METHYLPREDNISOLONE ACETATE 40 MG/ML
INJECTION, SUSPENSION INTRA-ARTICULAR; INTRALESIONAL; INTRAMUSCULAR; SOFT TISSUE
Status: DISCONTINUED | OUTPATIENT
Start: 2018-07-30 | End: 2018-07-30 | Stop reason: HOSPADM

## 2018-07-30 RX ADMIN — ALPRAZOLAM 0.5 MG: 0.5 TABLET, ORALLY DISINTEGRATING ORAL at 03:07

## 2018-07-30 RX ADMIN — BUPIVACAINE HYDROCHLORIDE 1 ML: 2.5 INJECTION, SOLUTION EPIDURAL; INFILTRATION; INTRACAUDAL; PERINEURAL at 04:07

## 2018-07-30 RX ADMIN — IOHEXOL 3 ML: 300 INJECTION, SOLUTION INTRAVENOUS at 04:07

## 2018-07-30 RX ADMIN — METHYLPREDNISOLONE ACETATE 40 MG: 40 INJECTION, SUSPENSION INTRA-ARTICULAR; INTRALESIONAL; INTRAMUSCULAR; SOFT TISSUE at 04:07

## 2018-07-30 RX ADMIN — LIDOCAINE HYDROCHLORIDE 10 ML: 10 INJECTION, SOLUTION EPIDURAL; INFILTRATION; INTRACAUDAL; PERINEURAL at 04:07

## 2018-07-30 NOTE — DISCHARGE SUMMARY
[facility]  Discharge Note  Short Stay    Admit Date: 7/30/2018    Discharge Date: 7/30/2018    Attending Physician: Darinel Carrion MD     Discharge Provider: Darinel Carrion    Diagnoses:  Active Hospital Problems    Diagnosis  POA    *Bilateral lumbar radiculopathy [M54.16]  Yes      Resolved Hospital Problems    Diagnosis Date Resolved POA   No resolved problems to display.       Discharged Condition: good    Final Diagnoses: Lumbar radiculopathy [M54.16]    Disposition: Home or Self Care    Hospital Course: no complications, uneventful    Outcome of Hospitalization, Treatment, Procedure, or Surgery:  Patient was admitted for outpatient procedure. The patient underwent procedure without complications and are discharged home    Follow up/Patient Instructions:  Follow up as scheduled in Pain Management clinic in 3-4 weeks/Patient has received instructions and follow up date and time    Medications:  Continue previous medications      Discharge Procedure Orders  Call MD for:  temperature >100.4     Call MD for:  severe uncontrolled pain     Call MD for:  redness, tenderness, or signs of infection (pain, swelling, redness, odor or green/yellow discharge around incision site)     Call MD for:  severe persistent headache     No dressing needed           Discharge Procedure Orders (must include Diet, Follow-up, Activity):    Discharge Procedure Orders (must include Diet, Follow-up, Activity)  Call MD for:  temperature >100.4     Call MD for:  severe uncontrolled pain     Call MD for:  redness, tenderness, or signs of infection (pain, swelling, redness, odor or green/yellow discharge around incision site)     Call MD for:  severe persistent headache     No dressing needed

## 2018-07-30 NOTE — OP NOTE
PROCEDURE DATE: 7/30/2018    PROCEDURE: Left L5/S1 transforaminal epidural steroid injection under fluoroscopy    DIAGNOSIS: Lumbar  Radiculopathy    Post op diagnosis: Same    PHYSICIAN: Darinel Carrion MD    MEDICATIONS INJECTED:  Methylprednisolone 40mg (0.5ml) and 1.5ml 0.25% bupivicaine at each nerve root.     LOCAL ANESTHETIC INJECTED:  Lidocaine 1%. 4 ml per site.    SEDATION MEDICATIONS: none    ESTIMATED BLOOD LOSS:  none    COMPLICATIONS:  none    TECHNIQUE:   A time-out was taken to identify patient and procedure side prior to starting the procedure. The patient was placed in a prone position, prepped and draped in the usual sterile fashion using ChloraPrep and sterile towels.  The area to be injected was determined under fluoroscopic guidance in AP and oblique view.  Local anesthetic was given by raising a wheal and going down to the hub of a 25-gauge 1.5 inch needle.  In oblique view, a 3.5 inch 22-gauge bent-tip spinal needle was introduced towards 6 oclock position of the pedicle of each above named nerve root level.  The needle was walked medially then hinged into the neural foramen and position was confirmed in AP and lateral views.  Omnipaque contrast dye was injected to confirm appropriate placement and that there was no vascular uptake.  After negative aspiration for blood or CSF, the medication was then injected. This was performed at the left L5/S1 level(s). The patient tolerated the procedure well.    The patient was monitored after the procedure.  Patient was given post procedure and discharge instructions to follow at home. The patient was discharged in a stable condition.

## 2018-07-30 NOTE — PROGRESS NOTES
7/30/18--Please note the following patient has been reassigned to Ailyn Mckenna RN in Outpatient Complex Care Management.     Thank you!  Luis Escobar RN

## 2018-07-30 NOTE — DISCHARGE INSTRUCTIONS
Recovery After Procedural Sedation (Adult)  You have been given medicine by vein to make you sleep during your surgery. This may have included both a pain medicine and sleeping medicine. Most of the effects have worn off. But you may still have some drowsiness for the next 6 to 8 hours.  Home care  Follow these guidelines when you get home:  · For the next 8 hours, you should be watched by a responsible adult. This person should make sure your condition is not getting worse.  · Don't drink any alcohol for the next 24 hours.  · Don't drive, operate dangerous machinery, or make important business or personal decisions during the next 24 hours.  Note: Your healthcare provider may tell you not to take any medicine by mouth for pain or sleep in the next 4 hours. These medicines may react with the medicines you were given in the hospital. This could cause a much stronger response than usual.  Follow-up care  Follow up with your healthcare provider if you are not alert and back to your usual level of activity within 12 hours.  When to seek medical advice  Call your healthcare provider right away if any of these occur:  · Drowsiness gets worse  · Weakness or dizziness gets worse  · Repeated vomiting  · You can't be awakened   Date Last Reviewed: 10/18/2016  © 0878-0080 [employer]. [address] [place] [address], [place] [address]. All rights reserved. This information is not intended as a substitute for professional medical care. Always follow your healthcare professional's instructions.      Home care instructions  Apply ice pack to the injection site for 20 minutes periods for the first 24 hrs for soreness/discomfort at injection site DO NOT USE HEAT FOR 24 HOURS  Keep site clean and dry for 24 hours, remove bandaid when desired  Do not drive until tomorrow  Take care when walking after a lumbar injection  Avoid strenuous activities for 2 days  Make take 2 weeks to feel the full effects   Resume home medication  as prescribed today  Resume Aspirin, Plavix, or Coumadin the day after the procedure unless otherwise instructed.    SEE IMMEDIATE MEDICAL HELP FOR:  Severe increase in your usual pain or appearance of new pain  Prolonged or increasing weakness or numbness in the legs or arms  Drainage, redness, active bleeding, or increased swelling at the injection site  Temperature over 100.0 degrees F.  Headache that increases when your head is upright and decreases when you lie flat    CALL 911 OR GO DIRECTLY TO EMERGENCY DEPARTMENT FOR:  Shortness of breath, chest pain, or problems breathing

## 2018-07-30 NOTE — H&P
CC: Back pain     HPI: The patient is a 86yo woman with a history of lumbar radiculopathy here for left L5/S1 TFESI. There are no major changes in history and physical from 2/22/18.    Past Medical History:   Diagnosis Date    Anticoagulant long-term use     Arthritis     Benign essential tremor 2000    Bilateral    Breast cancer     left , no B/p or sticks to left    Cataract     ou done    Colon polyps     Coronary artery disease     2 stents     DDD (degenerative disc disease), lumbar     Encounter for blood transfusion     GERD (gastroesophageal reflux disease)     Glaucoma     left eye    Hemiparesis affecting left side as late effect of cerebrovascular accident     HLD (hyperlipidemia)     HTN (hypertension)     Hyperparathyroidism     Hypothyroidism     Mobility impaired     uses wheelchair    Neck pain     radiating to both shoulders and arms    Osteoporosis     PVD (peripheral vascular disease)     Stroke     August 2016       Past Surgical History:   Procedure Laterality Date    APPENDECTOMY      BLADDER SUSPENSION      BREAST LUMPECTOMY      no bp/iv left arm    CARDIAC SURGERY      COLONOSCOPY      CORONARY ANGIOPLASTY WITH STENT PLACEMENT      x 2    Epidural steroid injection      Pain managment    ESOPHAGOGASTRODUODENOSCOPY N/A 7/2/2018    Procedure: EGD (ESOPHAGOGASTRODUODENOSCOPY);  Surgeon: Jassi Randolph MD;  Location[facility];  Service: Endoscopy;  Laterality: N/A;    EYE SURGERY      bilat phaco with iol    TONSILLECTOMY      TOTAL ABDOMINAL HYSTERECTOMY         Family History   Problem Relation Age of Onset    Essential Tremor Father     Stroke Father     Heart attack Father     Essential Tremor Sister     Stroke Sister     Essential Tremor Brother     Stroke Brother     Heart attack Brother     Stroke Mother     Heart attack Mother     Stroke Brother     Heart attack Brother     Stroke Brother     Heart attack Brother     Stroke Brother   "   Heart attack Brother     Stroke Brother     Heart attack Brother     Stroke Sister     Heart disease Sister     Stroke Sister     Heart attack Sister     Stroke Sister     Heart attack Sister     Essential Tremor Daughter     Essential Tremor Son     Anesthesia problems Neg Hx     Clotting disorder Neg Hx     Amblyopia Neg Hx     Blindness Neg Hx     Cancer Neg Hx     Cataracts Neg Hx     Diabetes Neg Hx     Glaucoma Neg Hx     Hypertension Neg Hx     Macular degeneration Neg Hx     Retinal detachment Neg Hx     Strabismus Neg Hx        Social History     Social History    Marital status:      Spouse name: N/A    Number of children: N/A    Years of education: N/A     Social History Main Topics    Smoking status: Never Smoker    Smokeless tobacco: Never Used    Alcohol use No    Drug use: No    Sexual activity: Not Asked     Other Topics Concern    None     Social History Narrative    None       No current facility-administered medications for this encounter.        Review of patient's allergies indicates:   Allergen Reactions    No known drug allergies        Vitals:    07/30/18 1449 07/30/18 1455   BP:  (!) 213/91   Pulse:  82   Resp:  16   Temp:  97.7 °F (36.5 °C)   TempSrc:  Skin   SpO2:  100%   Weight: 45.4 kg (100 lb)    Height: 5' 2" (1.575 m)        REVIEW OF SYSTEMS:     GENERAL: No weight loss, malaise or fevers.  HEENT:  No recent changes in vision or hearing  NECK: Negative for lumps, no difficulty with swallowing.  RESPIRATORY: Negative for cough, wheezing or shortness of breath, patient denies any recent URI.  CARDIOVASCULAR: Negative for chest pain, leg swelling or palpitations.  GI: Negative for abdominal discomfort, blood in stools or black stools or change in bowel habits.  MUSCULOSKELETAL: See HPI.  SKIN: Negative for lesions, rash, and itching.  PSYCH: No suicidal or homicidal ideations, no current mood disturbances.  HEMATOLOGY/LYMPHOLOGY: Negative for " prolonged bleeding, bruising easily or swollen nodes. Patient is not currently taking any anti-coagulants  ENDO: No history of diabetes or thyroid dysfunction  NEURO: No history of syncope, paralysis, seizures or tremors.All other reviewed and negative other than HPI.    Physical exam:  Gen: A and O x3, pleasant, well-groomed  Skin: No rashes or obvious lesions  HEENT: PERRLA, no obvious deformities on ears or in canals. No thyroid masses, trachea midline, no palpable lymph nodes in neck, axilla.  CVS: Regular rate and rhythm, normal S1 and S2, no murmurs.  Resp: Clear to auscultation bilaterally.  Abdomen: Soft, NT/ND, normal bowel sounds present.  Musculoskeletal/Neuro: Moving all extremities    Assessment:  Bilateral lumbar radiculopathy    Other orders  -     Vital signs; Standing  -     Verify informed consent; Standing  -     Notify physician ; Standing  -     Notify physician ; Standing  -     Notify physician (specify); Standing  -     Diet NPO; Standing  -     Place in Outpatient; Standing  -     Reason for No Pharmacological VTE Prophylaxis; Standing  -     alprazolam ODT dissolvable tablet 1 mg; Take 2 tablets (1 mg total) by mouth once.

## 2018-07-30 NOTE — PATIENT INSTRUCTIONS
Understanding the Link Between High Blood Pressure and Stroke  Each day that your blood pressure is too high, your chances of having a stroke are increased. Normal blood pressure is considered to be less than 120 over less than 80 millimeters of mercury (mmHg) or 120/80 mmHg. A stroke is a loss of brain function caused by a lack of blood to the brain. Stroke can result from the damage that ongoing high blood pressure causes in your vessels. If the affected vessel stops supplying blood to the brain, a stroke results.  High blood pressure damages blood vessels    Vessels thicken  When blood presses against a vessel wall with too much force, muscles in the wall lose their ability to stretch. This causes the wall to thicken, which narrows the vessel passage and reduces blood flow.   Clots form  When blood pressure is too high, it can damage blood vessel walls which results in scar tissue. Fat and cholesterol (plaque) collect in the damaged spots. Blood cells stick to the plaque, forming a mass called a clot. A clot can block blood flow in the vessel.   Vessels break  Sometimes blood flows with enough force to weaken a vessel wall. If the vessel is small or damaged, the wall can break. When this happens, blood leaks into nearby tissue and kills cells. Other cells may die because blood cannot reach them.   Know the symptoms of stroke  During a stroke, blood supply to the brain is cut off. But with prompt medical help, a better recovery is more likely. Think of a stroke as a brain attack. Dont wait. Call 911 if you have any of the symptoms below:  · Sudden weakness or numbness on one side of the face or body, including a leg or an arm  · Sudden trouble seeing with one or both eyes  · Sudden double vision  · Sudden trouble talking, such as slurred speech  · Sudden severe headache  · Sudden problems using or understanding words  · Sudden dizziness or loss of balance  · Seizures for the first time   · Any of these symptoms  that occur and then resolve    Date Last Reviewed: 6/13/2015  © 9704-9852 [employer]. [address] [place] [address], [place] [address]. All rights reserved. This information is not intended as a substitute for professional medical care. Always follow your healthcare professional's instructions.        Healthy Lifestyle to Prevent Another Stroke  Breaking old habits can be hard. But when your health is at stake, its never too late to make changes for the better. Some lifestyle changes might be easy for you. Others might be tough. So if you need help, talk with your doctor, family, and friends.  Make healthy changes  · Diet. Your healthcare provider will give you information on dietary changes that you may need to make, based on your situation. Your provider may recommend that you see a registered dietitian for help with diet changes.    · Weight management. If you are overweight, your healthcare provider will work with you to lose weight and lower your BMI (body mass index) to a normal or near-normal level. Making diet changes and increasing physical activity can help.    · Stop smoking. If you smoke, the time to quit is now. Theres no more time for excuses. Smoking raises blood pressure and damages arteries--both of which can lead to a stroke. To stop smoking, ask your doctor for help. Join a stop-smoking program. Make a list of reasons to quit, including that you'll lower your risk of lung cancer, and read it daily.  · Limit alcohol. Drinking too much alcohol can raise blood pressure and increase the risk for stroke. Alcohol can also react with certain medicines. Ask your doctor if its safe for you to drink alcohol.  · Get support.  A stroke can leave you feeling frustrated or depressed. Dont ignore your feelings, but dont dwell on them either. Focus on what you can do. Talking to family, friends, your doctor, or clergy can also help.  · Reduce stress. Stress can make your heart work harder and raise  blood pressure. To reduce stress, try to let go of daily annoyances. Ask yourself if problems will still matter a week from now. Getting proper rest can also help. Finally, dont be embarrassed to ask for help when you need it.  · Exercise. Strength and aerobic training improves your ability to function and do activities of daily living. It also reduces your risk for another stroke. Develop a custom plan with your physical therapist to meet activity goals.  If you have high blood pressure    One of the most important things you can do to prevent another stroke is to keep your blood pressure under control. If you have high blood pressure:  · Take all your medicines as directed.  · Get regular exercise. You should try to work up to getting at least 40 minutes of moderate to high intensity physical activity at least 3 to 4 days each week.   · Talk with your doctor about limiting fat and salt in your diet. You most likely will be told to limit your salt intake to 2,400 milligrams (mg) or less each day.   · Check your blood pressure regularly. Write down your numbers and bring them to checkups with your doctor.  Manage other health problems  Strokes are often closely related to certain health problems. These include high blood pressure, heart disease, and diabetes. If you have any of these conditions, its more important than ever to keep them under control. Do this by taking any prescribed medicines and having regular checkups. Keep in mind, too, that the same healthy lifestyle choices that prevent stroke will also help control these health problems.  For family and friends  Its much harder for your loved one to make lifestyle changes if he or she is feeling low. So be on the lookout for sadness, depression, or hopelessness. These feelings are not uncommon after a stroke. Talk with the doctor if you have concerns.   Date Last Reviewed: 6/15/2015  © 9644-7339 [employer]. [address] [place] [address], [facility]  [place] [address]. All rights reserved. This information is not intended as a substitute for professional medical care. Always follow your healthcare professional's instructions.        Preventing Recurrent Stroke: Eating Healthy  Eating healthy foods helps lower cholesterol levels and reduce plaque buildup in arteries. It can also help you lose weight and keep high blood pressure under control. Eating better doesnt necessarily mean going on a special diet, unless you have diabetes or high blood pressure. Instead, the idea is to make healthier choices by limiting foods and ingredients that contribute to risk factors for stroke.  Meats  Instead of:  · Beef and other red meats  · Hamburger  · Processed lunch meats  Try:  · Fish, skinless chicken, or tofu  · Ground turkey  · Chicken or turkey breast slices Sweets and snacks  Instead of:  · Soda pop  · Chips and other salty snacks  · Donuts and croissants  Try:  · Water or diet soda  · Nuts, seeds, air-popped popcorn  · Fresh fruit, whole-grain raisin bread   Grains  Instead of:  · White bread  · White rice  · Regular pasta or noodles  · White potatoes  Try:  · Whole-grain bread  · Brown rice  · Whole-grain pasta or noodles  · Sweet potatoes Dairy  Instead of:  · Whole milk  · Regular cheese and mayonnaise  · Ice cream  · Butter  Try:  · 1% or skim milk  · Low-fat cheese and mayonnaise  · Low-fat yogurt  · Olive or canola oil     Choose the right mix of foods  The key to good eating is having a variety of healthy foods. Try to plan meals around vegetables, fruits, lean meats, and whole grains. Limit fatty meats and high-fat dairy products. The chart below can show you the best way to fill up your plate.  1. Drink water or low-fat (1% of fat-free skim) milk with meals. Avoid sugary sodas and salty vegetable juices.  2. At least half the plate should be vegetables and fruits. Limit fatty toppings, such as butter, salad dressing, and sour cream.  3. No more than one-quarter of the  plate should be meat or other protein. Fish, beans, tofu, and lean cuts of poultry are best. Bake or broil meat instead of frying.  4. About one-quarter of the plate can be starchy foods, such as rice and potatoes. Whole grains, such as brown rice or wholewheat bread, are best.  Try healthier options  Giving up old food habits doesnt have to be hard. Encouragement makes it easier to stick with a healthy eating plan. Here are some easy ways to choose healthier options:  Choose fats wisely  Reducing bad fats in your diet helps keep your arteries healthier. Use this guide:  · Choose unsaturated fats. These are found in foods such as fish, nuts, olive oil, canola oil, and avocados. In moderation, these fats can be good for you.  · Limit saturated fats. These are found in meat and dairy foods, such as burgers, poultry skin, milk, cheese, and butter.  · Avoid trans fats. These are often found in processed foods. Avoid any food that has the word hydrogenated in its ingredients.  Reduce sodium (salt)  You may be asked to eat less sodium (mainly found in salt). If you have high blood pressure, your health care provider will probably recommend that you limit your sodium intake to 1,500 mg to 2,400 mg per day. Use these tips:  · Look for food labels that say salt free or very low sodium. Always check for the number of servings per container on the food label, as a container of food may have more than 1 serving.   · Avoid canned and packaged foods such as canned soup, instant noodles, TV dinners, and premade sauces.  · Dont add salt or soy sauce to meals. Use fresh herbs or lemon juice for seasoning. Your taste buds will adjust.  · Avoid fast food. Look for heart healthy items on restaurant menus. These are often lower in fat and salt.     For family and friends  Good eating habits are easier when everyone joins in.  · Help shop for healthy foods. Choose lots of fresh fruits and vegetables.  · Its most helpful if  everyone in the family eats healthy foods.   Date Last Reviewed: 6/15/2015  © 2654-1840 [employer]. [address] [place] [address], [place] [address]. All rights reserved. This information is not intended as a substitute for professional medical care. Always follow your healthcare professional's instructions.        Symptoms of a Stroke     A sudden feeling of weakness on one side of your body may be a sign that you are having a stroke.   During a stroke, blood stops flowing to part of the brain. This can damage areas in the brain that control the rest of the body. Get help right away if any of these symptoms come on suddenly, even if the symptoms dont last.  Know the symptoms of a stroke  · Weakness. You may feel a sudden weakness, tingling, or a loss of feeling on 1 side of your face or body including your arm or leg.   · Vision problems. You may have sudden double vision or trouble seeing in 1 or both eyes.  · Speech problems. You may have sudden trouble talking, slurred speech, or problems understanding others.  · Headache. You may have a sudden, severe headache.  · Movement problems. You may have sudden trouble walking, dizziness, a feeling of spinning, a loss of balance, a feeling of falling, or blackouts.  · Seizure. You may also have a seizure with a large or hemorrhagic stroke.   Remember: If you have any of these symptoms, call 911 and your doctor as soon as possible.  F.A.S.T. is an easy way to remember the signs of a stroke. When you see these signs, you will know that you need to call 911 fast.   F.A.S.T. stands for:  · F is for face drooping - One side of the face is drooping or numb. When the person smiles, the smile is uneven.  · A is for arm weakness - One arm is weak or numb. When the person lifts both arms at the same time, one arm may drift downward.  · S is for speech difficulty - You may notice slurred speech or difficulty speaking. The person can't repeat a simple sentence correctly  when asked.  · T is for time to dial 911 - If someone shows any of these symptoms, even if they go away, call 911 immediately. Make note of the time the symptoms first appeared.   Date Last Reviewed: 8/26/2015 © 2000-2017 [email or web address]. [address] [place] [address]. All rights reserved. This information is not intended as a substitute for professional medical care. Always follow your healthcare professional's instructions.        Lumbar Epidural Injection: Recovery at Home    After a lumbar epidural injection, you dont need to stay in bed when you get home. In fact, its best to walk around if you feel up to it. Just be careful about being too active. Even if you feel better right away, avoid activities that may strain your back. And follow up on all treatment with your doctor.  What to know about pain relief  Keep in mind that some people feel more pain at first. It usually goes away within a few days. You may also have headaches or trouble sleeping, but if these symptoms are severe, you should call your doctor right away. These should also go away within a few days. In general:  · An injection to reduce inflammation takes a few days to work, sometimes even up to a week. There may even be more pain at first.  · An injection to help locate the source of pain may give only brief pain relief. Later, youll feel the same as you did before the injection.  Tips for recovery  Whether you were injected for pain relief or diagnosis, these tips will help you recover:  · Take walks when you feel up to it.  · Rest if needed, but get up and move around after sitting for half an hour.  · Dont exercise vigorously.  · Dont drive the day of the procedure or until your doctor says its OK.  · Return to work or other activities when your doctor says youre ready.  When to call your doctor  Call right away if you notice any of the following symptoms:  · Severe pain or headache  · Loss of bladder or bowel  control  · Fever or chills  · Redness or swelling around the injection site       Date Last Reviewed: 11/1/2015  © 4884-1859 [employer]. [address] [place] [address]. All rights reserved. This information is not intended as a substitute for professional medical care. Always follow your healthcare professional's instructions.        Complementary Care for Pain  You may find pain relief with complementary care. Look for a licensed or certified professional. And always tell your healthcare professional that you are using complementary care.    Massage  Massage can increase circulation and relaxation. This can help relieve stress and pain.  Biofeedback  Biofeedback uses instruments to measure the body's physiological activity, like heart rate and muscle activity. This information is used to help you learn to control certain functions, such as relaxing muscles and helping reduce pain.   Chiropractic  Chiropractic adjusts the spine and joints. It may help reduce back, neck, or joint pain. Chiropractic may also use mild electrical stimulation, massage, heat, or ultrasound (sound waves).  Acupuncture  Acupuncture uses thin needles to help treat pain. The treatment may release the bodys own painkillers.  Distraction  Distraction helps you focus on something besides pain. Try reading a book, watching a movie, or talking with family. Or visit a local attraction.  Meditation  Meditation helps you focus on words, objects, or ideas. Doing this can calm you and decrease stress.  Relaxation  Relaxation includes methods like listening to soothing music or relaxation tapes. You might try slow, deep breathing. Imagine a calm scene, like an ocean or mountain, as you breathe.  Date Last Reviewed: 5/1/2017 © 2000-2017 [employer]. [address] [place] [address]. All rights reserved. This information is not intended as a substitute for professional medical care. Always follow your  healthcare professional's instructions.

## 2018-08-02 RX ORDER — HYDROCODONE BITARTRATE AND ACETAMINOPHEN 10; 325 MG/1; MG/1
1 TABLET ORAL EVERY 6 HOURS PRN
Qty: 60 TABLET | Refills: 0 | Status: SHIPPED | OUTPATIENT
Start: 2018-08-02 | End: 2018-08-16 | Stop reason: SDUPTHER

## 2018-08-02 RX ORDER — LISINOPRIL 2.5 MG/1
2.5 TABLET ORAL DAILY
Qty: 30 TABLET | Refills: 0 | OUTPATIENT
Start: 2018-08-02 | End: 2019-08-02

## 2018-08-02 NOTE — TELEPHONE ENCOUNTER
Spoke with patient. She stated she is still having pain following injection. I informed patient this make 7-10 days to take full effect. She is also requesting a refill on the hydrocodone. Please advise.

## 2018-08-02 NOTE — TELEPHONE ENCOUNTER
----- Message from Lisa Wahl sent at 8/2/2018  1:43 PM CDT -----  Type:  RX Refill Request    Who Called:  Martin Refill or New Rx:  Refill   RX Name and Strength:  lisinopirl 2.5 mg   How is the patient currently taking it? (ex. 1XDay):  1 x day   Is this a 30 day or 90 day RX:  90 day supplyPreferred Pharmacy with phone number:  [facility]  Local   Ordering Provider:  Dr Haley Aleman Call Back Number:  [phone]  Additional Information:

## 2018-08-02 NOTE — TELEPHONE ENCOUNTER
----- Message from Stacia Zhang sent at 8/2/2018 12:29 PM CDT -----  Contact: self  Patient would like a call back regarding her injections. Please call patient at [phone]. Thanks!

## 2018-08-09 RX ORDER — GABAPENTIN 300 MG/1
300 CAPSULE ORAL 3 TIMES DAILY
Qty: 90 CAPSULE | Refills: 0 | Status: SHIPPED | OUTPATIENT
Start: 2018-08-09 | End: 2018-09-07 | Stop reason: SDUPTHER

## 2018-08-09 NOTE — TELEPHONE ENCOUNTER
Left message that the refill was approved, but the patient will need an appointment in order to get further refills. Home phone unable to leave a message. Cell disconnected.

## 2018-08-15 ENCOUNTER — OUTPATIENT CASE MANAGEMENT (OUTPATIENT)
Dept: ADMINISTRATIVE | Facility: OTHER | Age: 83
End: 2018-08-15

## 2018-08-16 ENCOUNTER — TELEPHONE (OUTPATIENT)
Dept: FAMILY MEDICINE | Facility: CLINIC | Age: 83
End: 2018-08-16

## 2018-08-16 RX ORDER — HYDROCODONE BITARTRATE AND ACETAMINOPHEN 10; 325 MG/1; MG/1
1 TABLET ORAL EVERY 6 HOURS PRN
Qty: 60 TABLET | Refills: 0 | Status: SHIPPED | OUTPATIENT
Start: 2018-08-16 | End: 2018-09-10 | Stop reason: SDUPTHER

## 2018-08-16 RX ORDER — LISINOPRIL 2.5 MG/1
2.5 TABLET ORAL DAILY
Qty: 30 TABLET | Refills: 11 | Status: ON HOLD | OUTPATIENT
Start: 2018-08-16 | End: 2019-04-15 | Stop reason: HOSPADM

## 2018-08-16 NOTE — TELEPHONE ENCOUNTER
"----- Message from Ailyn Mckenna RN sent at 8/16/2018 11:30 AM CDT -----  Contact: Meka Gomes RN-ZANDER  Good morning. This is Meka Gomes RN with [facility] Team. I received a referral on the above patient. I spoke with the patient today on the telephone and she reports:  1) She needs an appointment with your office. 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     2) While attempting to reconcile the patient's medications with [facility], the following medications were reported as not being filled in quite some time and need to clarify if should be taking them:  tramadol (Ultram) 50mg, 2 tablets [id number] PRN pain  furosemide 20mg daily  Lidocaine (Lidoderm) 5% patches, daily and remove after 12 hours  ondansetron (Zofran) 4mg [id number] PRN Nausea    3) Pharmacist also stated that the patient needs prescription for lisinopril 2.5mg daily, if indeed, the patient needs to be continuing this medication.     Please advise.     Kindest Regards,  Meka Gomes RN-[facility]  [phone] or [phone]  "

## 2018-08-16 NOTE — TELEPHONE ENCOUNTER
Spoke with the patient and she made her follow up appointment for Aug 29th. She stated that she is severe pain and is requesting a refill on her pain medication. She got the last RX on 8/2. I questioned if she was out and she stated that she is because she had to take more because of the pain. She is currently taking Tylenol and this is not helping. She was advised that she may have to wait until she has her follow up and is aware of that possibility. Please advise.

## 2018-08-16 NOTE — PROGRESS NOTES
Clinical Reference Documents Added to Patient Instructions       Document    INJECTION, LUMBAR EPIDURAL: RECOVERY AT HOME (ENGLISH)    PAIN, COMPLEMENTARY CARE FOR (ENGLISH)    STROKE AND HIGH BLOOD PRESSURE, UNDERSTANDING THE LINK BETWEEN (ENGLISH)    STROKE, PREVENTING RECURRENT WITH A HEALTHIER LIFESTYLE (ENGLISH)    STROKE, PREVENTING RECURRENT: EATING HEALTHY (ENGLISH)    STROKE, SYMPTOMS (ENGLISH)

## 2018-08-16 NOTE — PROGRESS NOTES
"08/15/2018  Summary:  RN-DIANA contacted patient to follow up for OPCM. Patient transferred to this RN from previous nurse, Luis Escobar RN. Chart review completed prior to call. Spoke with patient who reports that she has not received the resource information mailed by previous nurse at enrollment on 7/27/18. Will re-mail information to patient today and review at next call. Patient reports that she had the MYLES with Dr. Carrion on 7/30/18. She reports that she is still having quite a bit of pain in her lower back and radiating to her left hip. She states that she had a stroke and has very little movement on her left side. She questions whether the pain going into her left hip has anything to do with the stroke. She states that is able to get around in her home with a wheelchair, but would like to be able to ambulate with a walker again. Patient states that the "nurse" told her to make an appointment with her PCP, Dr. Houston Sheppard, and let him evaluate her to see what kind of walker would be the best for her to get. She states that she has a follow up appointment with KACI Nogueira with Dr. Carrion on 9/9/18 and will talk to him about what the next step for pain relief would be. When I checked patient's scheduled upcoming appointments, I did not see any appointments scheduled for patient and also noted that 9/9/18 falls on a Sunday. I checked the past scheduled appointments and it appears that patient's appointment was actually scheduled on 8/9/18 at 11:30 and patient was a "No Show". She states that she must have wrote it down wrong and will have to reschedule. I will send an in-basket message to Pain Management to reach out to patient to re-schedule as patient is still having significant pain and needs a follow up from the MYLES injection. I will also send an in-basket message to PCP to reach out to patient to schedule an appointment with him and request order for appropriate walker for patient. " Patient states that she has to make the appointment on a day when her son is off of work and can take her. She states that her daughter lives with her, but has extremely poor vision, so cannot drive.  Patient declined offer to provide resource information for additional transportation options. Patient was unable to complete medication reconciliation with RN, but requested that I contact [facility] to verify her medications as she gets everything filled here. I contacted the Pharmacist at [facility] and was able to verify her prescription medications, but not the OTC meds. There were several PRN medications that patient has not filled in a while (tramadol 100mg PRN pain, furosemide 20mg daily, lidocaine 5% patch daily for 12 hours, ondansetron 4mg PRN nausea) and one medication that patient needs a new prescription for if PCP wants patient to continue taking (lisinopril 2.5mg daily). Will try to reach patient's son to see if he can verify OTC medications. We reviewed the Signs/Symptoms of Stroke using the FAST pneumonic:   · F is for face drooping - One side of the face is drooping or numb. When the person smiles, the smile is uneven.  · A is for arm weakness - One arm is weak or numb. When the person lifts both arms at the same time, one arm may drift downward.  · S is for speech difficulty - You may notice slurred speech or difficulty speaking. The person can't repeat a simple sentence correctly when asked.  · T is for time to dial 911 - If someone shows any of these symptoms, even if they go away, call 911 immediately. Make note of the time the symptoms first appeared    Will review educational information on Pain Management with patient at next call. Patient states that she is tired and is requesting to end call at this time. She is requesting that I call back in 2 weeks to give her time to receive and review the educational information that I re-mailed to her today. Will continue to follow. -  , RN-OPCM     Interventions:  - Re-mailed resource information on Stroke and Advanced Directive/POA.  - Re-mailed resource information on MYLES and Pain Management.  - Sent in-basket message to KACI Nogueira with Pain Management to reschedule MYLES F/U appointment that patient missed.   - Sent in-basket message to PCP, Dr. Houston Sheppard regarding scheduling an appointment, order for walker and medication discrepancy questions.   - Completed medication reconciliation of prescription medications with Pharmacist from [facility] at patient's request.   - Reviewed S/S of stroke using FAST pneumonic with patient. Stressed importance:  When you see these signs, you will know that you need to call 911 fast.      Plan:  - Follow up with Pain Management regarding rescheduling MYLES follow up appointment. - 8/16/18 Appt scheduled with KACI Nogueira for 8/29/18 1:30PM  - Follow up with PCP regarding scheduling an appointment, order for walker and medication discrepancy questions. - 8/16/18 PCP sent in RX for lisinopril, scheduling appt and will eval for walker at that time per MD response  - Complete OTC portion of Medication Reconciliation with son.   - Follow up on receipt of resource materials for Stroke, MYLES, Pain Management and Advance Directive/POA re-mailed to patient on 8/16/18.  - Assess for outcome of Left L5/S1 transforaminal epidural steroid injection on 7/30/18 with Dr. Darinel Carrion. - 8/15/18 Still having pain in lower back, radiating to left hip. Missed F/U appointment with PA on 8/9/18 - Needs to re-schedule. - 8/16/18 Appt scheduled with KACI Nogueira for 8/29/18 1:30PM  - Educate patient on signs and symptoms of Stroke and importance of seeking immediate medical attention if symptoms are present. - Done 8/15/18  - Assess for retention of FAST pneumonic (stroke symptoms).  - Educate on Pain Management and Alternative Therapies.

## 2018-08-16 NOTE — TELEPHONE ENCOUNTER
----- Message from Ailyn Mckenna RN sent at 8/16/2018 11:20 AM CDT -----  Contact: Meka Gomes RN-OPC  Good morning. This is Meka Gomes RN with [facility] Team. I  received a referral on the above patient. I spoke with the patient today on the telephone and she reported that she had an MYLES  f/u appointment scheduled with you on 9/9/18. When I checked the schedule, it appears that the appointment was actually scheduled for 8/9/18 and patient missed the appointment. She reports that she is having significant pain in her lower back radiating to her left hip. Can your office please reach out to the patient and get her scheduled for a follow up appointment? Patient relies on her son to provide transportation so will have to get an appointment when her son is off of work. Please advise.     Kind Regards,  Meka Gomes RN-[facility]  [phone] or [id number]

## 2018-08-16 NOTE — TELEPHONE ENCOUNTER
See message and advise. LOV 12/6/17 with numerous cancelled appointments to this date. Patient needs appointment to come in and address medication and walker. Please advise if you agree?

## 2018-08-16 NOTE — TELEPHONE ENCOUNTER
See message from Dr Sheppard below. I attempted to contact patient at both numbers and was unable to leave a voicemail on either line. Please advise patient.

## 2018-08-16 NOTE — TELEPHONE ENCOUNTER
I have told her at last visit that she is not safe for a walker, so I would not recommend that..  It is time for her to com in for an appointment at next available time. I did refill the lisinopril.

## 2018-08-25 ENCOUNTER — TELEPHONE (OUTPATIENT)
Dept: FAMILY MEDICINE | Facility: CLINIC | Age: 83
End: 2018-08-25

## 2018-08-27 RX ORDER — RABEPRAZOLE SODIUM 20 MG/1
TABLET, DELAYED RELEASE ORAL
Qty: 30 TABLET | Refills: 0 | Status: SHIPPED | OUTPATIENT
Start: 2018-08-27 | End: 2018-09-22 | Stop reason: SDUPTHER

## 2018-08-27 NOTE — TELEPHONE ENCOUNTER
Spoke to pt to schedule appt. Pt stated that she would call the clinic back to schedule appt once she finds out when her son can bring her.

## 2018-08-30 ENCOUNTER — OFFICE VISIT (OUTPATIENT)
Dept: CARDIOLOGY | Facility: CLINIC | Age: 83
End: 2018-08-30
Payer: MEDICARE

## 2018-08-30 ENCOUNTER — LAB VISIT (OUTPATIENT)
Dept: LAB | Facility: HOSPITAL | Age: 83
End: 2018-08-30
Attending: NURSE PRACTITIONER
Payer: MEDICARE

## 2018-08-30 ENCOUNTER — OFFICE VISIT (OUTPATIENT)
Dept: FAMILY MEDICINE | Facility: CLINIC | Age: 83
End: 2018-08-30
Payer: MEDICARE

## 2018-08-30 VITALS
HEART RATE: 75 BPM | RESPIRATION RATE: 20 BRPM | DIASTOLIC BLOOD PRESSURE: 62 MMHG | SYSTOLIC BLOOD PRESSURE: 108 MMHG | WEIGHT: 132.94 LBS | BODY MASS INDEX: 24.46 KG/M2 | TEMPERATURE: 98 F | OXYGEN SATURATION: 98 % | HEIGHT: 62 IN

## 2018-08-30 VITALS
WEIGHT: 132.94 LBS | BODY MASS INDEX: 24.46 KG/M2 | SYSTOLIC BLOOD PRESSURE: 120 MMHG | HEART RATE: 68 BPM | DIASTOLIC BLOOD PRESSURE: 60 MMHG | HEIGHT: 62 IN

## 2018-08-30 DIAGNOSIS — R40.20 LOSS OF CONSCIOUSNESS: ICD-10-CM

## 2018-08-30 DIAGNOSIS — I69.354 HEMIPARESIS AFFECTING LEFT SIDE AS LATE EFFECT OF CEREBROVASCULAR ACCIDENT: ICD-10-CM

## 2018-08-30 DIAGNOSIS — I25.10 CORONARY ARTERY DISEASE INVOLVING NATIVE CORONARY ARTERY OF NATIVE HEART WITHOUT ANGINA PECTORIS: ICD-10-CM

## 2018-08-30 DIAGNOSIS — I10 HYPERTENSION, UNSPECIFIED TYPE: ICD-10-CM

## 2018-08-30 DIAGNOSIS — R55 SYNCOPE AND COLLAPSE: ICD-10-CM

## 2018-08-30 DIAGNOSIS — R42 DIZZINESS: ICD-10-CM

## 2018-08-30 DIAGNOSIS — Z86.73 HISTORY OF CVA (CEREBROVASCULAR ACCIDENT): ICD-10-CM

## 2018-08-30 DIAGNOSIS — I21.4 NSTEMI (NON-ST ELEVATED MYOCARDIAL INFARCTION): ICD-10-CM

## 2018-08-30 DIAGNOSIS — R94.31 ABNORMAL EKG: ICD-10-CM

## 2018-08-30 DIAGNOSIS — R11.0 NAUSEA: ICD-10-CM

## 2018-08-30 DIAGNOSIS — R55 SYNCOPE, UNSPECIFIED SYNCOPE TYPE: ICD-10-CM

## 2018-08-30 DIAGNOSIS — R11.0 NAUSEA: Primary | ICD-10-CM

## 2018-08-30 LAB
ANION GAP SERPL CALC-SCNC: 9 MMOL/L
BASOPHILS # BLD AUTO: 0.08 K/UL
BASOPHILS NFR BLD: 1.6 %
BUN SERPL-MCNC: 17 MG/DL
CALCIUM SERPL-MCNC: 10.9 MG/DL
CHLORIDE SERPL-SCNC: 110 MMOL/L
CO2 SERPL-SCNC: 25 MMOL/L
CREAT SERPL-MCNC: 0.7 MG/DL
DIFFERENTIAL METHOD: ABNORMAL
EOSINOPHIL # BLD AUTO: 0 K/UL
EOSINOPHIL NFR BLD: 0.6 %
ERYTHROCYTE [DISTWIDTH] IN BLOOD BY AUTOMATED COUNT: 19.1 %
EST. GFR  (AFRICAN AMERICAN): >60 ML/MIN/1.73 M^2
EST. GFR  (NON AFRICAN AMERICAN): >60 ML/MIN/1.73 M^2
GLUCOSE SERPL-MCNC: 107 MG/DL
HCT VFR BLD AUTO: 36.5 %
HGB BLD-MCNC: 10.9 G/DL
IMM GRANULOCYTES # BLD AUTO: 0.01 K/UL
IMM GRANULOCYTES NFR BLD AUTO: 0.2 %
LYMPHOCYTES # BLD AUTO: 1.4 K/UL
LYMPHOCYTES NFR BLD: 28.1 %
MCH RBC QN AUTO: 26.4 PG
MCHC RBC AUTO-ENTMCNC: 29.9 G/DL
MCV RBC AUTO: 88 FL
MONOCYTES # BLD AUTO: 0.5 K/UL
MONOCYTES NFR BLD: 10.2 %
NEUTROPHILS # BLD AUTO: 2.9 K/UL
NEUTROPHILS NFR BLD: 59.3 %
NRBC BLD-RTO: 0 /100 WBC
PLATELET # BLD AUTO: 297 K/UL
PMV BLD AUTO: 10.4 FL
POTASSIUM SERPL-SCNC: 4.1 MMOL/L
RBC # BLD AUTO: 4.13 M/UL
SODIUM SERPL-SCNC: 144 MMOL/L
WBC # BLD AUTO: 4.88 K/UL

## 2018-08-30 PROCEDURE — 93005 ELECTROCARDIOGRAM TRACING: CPT | Mod: PBBFAC,PO | Performed by: INTERNAL MEDICINE

## 2018-08-30 PROCEDURE — 93010 ELECTROCARDIOGRAM REPORT: CPT | Mod: S$PBB,,, | Performed by: INTERNAL MEDICINE

## 2018-08-30 PROCEDURE — 99215 OFFICE O/P EST HI 40 MIN: CPT | Mod: PBBFAC,PO,25 | Performed by: NURSE PRACTITIONER

## 2018-08-30 PROCEDURE — 80048 BASIC METABOLIC PNL TOTAL CA: CPT

## 2018-08-30 PROCEDURE — 36415 COLL VENOUS BLD VENIPUNCTURE: CPT | Mod: PO

## 2018-08-30 PROCEDURE — 99214 OFFICE O/P EST MOD 30 MIN: CPT | Mod: S$PBB,,, | Performed by: NURSE PRACTITIONER

## 2018-08-30 PROCEDURE — 99999 PR PBB SHADOW E&M-EST. PATIENT-LVL V: CPT | Mod: PBBFAC,,, | Performed by: NURSE PRACTITIONER

## 2018-08-30 PROCEDURE — 85025 COMPLETE CBC W/AUTO DIFF WBC: CPT

## 2018-08-30 PROCEDURE — 99999 PR PBB SHADOW E&M-EST. PATIENT-LVL III: CPT | Mod: PBBFAC,,, | Performed by: INTERNAL MEDICINE

## 2018-08-30 PROCEDURE — 99213 OFFICE O/P EST LOW 20 MIN: CPT | Mod: PBBFAC,27,PO | Performed by: INTERNAL MEDICINE

## 2018-08-30 PROCEDURE — 99214 OFFICE O/P EST MOD 30 MIN: CPT | Mod: S$PBB,,, | Performed by: INTERNAL MEDICINE

## 2018-08-30 RX ORDER — DOCUSATE SODIUM 100 MG/1
100 CAPSULE, LIQUID FILLED ORAL 2 TIMES DAILY PRN
COMMUNITY

## 2018-08-30 RX ORDER — ONDANSETRON 8 MG/1
8 TABLET, ORALLY DISINTEGRATING ORAL EVERY 8 HOURS PRN
Qty: 6 TABLET | Refills: 0 | Status: SHIPPED | OUTPATIENT
Start: 2018-08-30 | End: 2019-01-11

## 2018-08-30 RX ORDER — CALCIUM CARBONATE/VITAMIN D3 600MG-5MCG
1 TABLET ORAL DAILY
COMMUNITY

## 2018-08-30 NOTE — PROGRESS NOTES
Subjective:    Patient ID:  Martha Peres is a 85 y.o. female who presents for follow-up of Coronary Artery Disease (REFERRED BY NP); Nausea; and Headache        Pt sent by NP for a syncopal spell earlier this week. Pt has had HA's has been nauseated and dizzy for a while and Monday she was helped to the BR by family and when she got up to move to toilet she passed out for a few seconds. She refused to go to the ER. Since then she has occasional dizziness has a chronic HA (for years) and nausea but no vomiting. No further syncope. She had a recent hospital stay for anemia and had transfusion. She denies chest pain or SOB. She denies any palpitations.        Review of Systems   Constitution: Positive for weakness. Negative for weight gain and weight loss.   HENT: Negative.    Eyes: Negative.    Cardiovascular: Negative for chest pain, claudication, cyanosis, dyspnea on exertion, irregular heartbeat, leg swelling, near-syncope, orthopnea (no PND), palpitations and syncope.   Respiratory: Negative for cough, hemoptysis, shortness of breath and snoring.    Endocrine: Negative.    Skin: Negative.    Musculoskeletal: Negative for joint pain, muscle cramps, muscle weakness and myalgias.   Gastrointestinal: Positive for nausea. Negative for diarrhea, hematemesis and vomiting.   Genitourinary: Negative.    Neurological: Positive for dizziness. Negative for focal weakness, light-headedness, loss of balance, numbness, paresthesias and seizures.   Psychiatric/Behavioral: Negative.         Objective:    Physical Exam   Constitutional: She is oriented to person, place, and time. She appears well-developed and well-nourished.   Eyes: Pupils are equal, round, and reactive to light.   Neck: Normal range of motion. No thyromegaly present.   Cardiovascular: Normal rate, regular rhythm, S1 normal, S2 normal, normal heart sounds, intact distal pulses and normal pulses.  No extrasystoles are present. PMI is not displaced. Exam reveals no  friction rub.   No murmur heard.  Pulmonary/Chest: Effort normal and breath sounds normal. She has no wheezes. She has no rales. She exhibits no tenderness.   Abdominal: Soft. Bowel sounds are normal. She exhibits no distension and no mass. There is no tenderness.   Musculoskeletal: Normal range of motion. She exhibits no edema.   Neurological: She is alert and oriented to person, place, and time.   L hemiparasis   Skin: Skin is warm and dry.   Vitals reviewed.      Test(s) Reviewed  I have reviewed the following in detail:  [] Stress test   [] Angiography   [x] Echocardiogram   [x] Labs   [x] Other:  ECG       Assessment:       1. Coronary artery disease involving native coronary artery of native heart without angina pectoris    2. NSTEMI (non-ST elevated myocardial infarction)    3. Hypertension, unspecified type    4. Dizziness    5. Syncope and collapse         Plan:       Pt with what sounds like vagal spell vs hypotensive event possibly due to anemia given recent hospitalization  Need to see labs when done  Repeat echo

## 2018-08-30 NOTE — PATIENT INSTRUCTIONS
Causes of Syncope  Syncope (fainting) has many causes. Sometimes it is not serious. In other cases, syncope is a sign of a heart problem. But treatment can help    When syncope is not serious  Your healthcare provider may call your problem vasovagal syncope, reflex syncope, or orthostatic hypotension. These types of syncope are generally not serious. They can be caused by:  · Strong feelings, such as anxiety or fear. A nerve signal may briefly change your heart rate and lower your blood pressure too much.  · Standing for too long. Standing may cause blood to pool in your legs. When this happens, your brain may not receive all the blood it needs.  · Standing up too quickly. Your blood pressure may not adjust fast enough to changes in posture and may drop too low. Certain medicines can also cause this problem. Examples of medicines that can cause a drop in blood pressure include diuretics, blood pressure medicines, and medicines for chest pain. Your pharmacist or healthcare provider can discuss these with you.  · Reaction to normal body functions. When you go to the bathroom, have gastrointestinal discomfort, nausea, or pain, your heart may have a natural reflex to slow down and lower blood pressure. This can result in syncope. This may also follow exercise, eating, laughter, weight lifting, or playing musical instruments like the trumpet or trombone.  When heart trouble causes syncope  A heart problem can decrease the amount of oxygen-rich blood that reaches the brain. Heart trouble can be serious and even life threatening if not treated:  · A slow heart rate. Electrical signals tell the chambers of the heart when to pump. But the signals may be slowed or blocked (heart block) as they travel on the hearts electrical pathways. This can be caused by aging, scarred heart tissue, or damage from heart disease. When the heart rate slows, not enough blood is pumped.  · A fast heart rate. Certain problems can make the  heart race. For instance, after a heart attack, also known as acute myocardial infarction, or AMI, abnormal electrical signals may be created. These signals can make the heart suddenly beat very fast. The heart pumps before the chambers can fill with blood. So less blood reaches the brain and other parts of the body. Illegal drugs, certain medicines, heart disease, or an inherited condition can also cause this.  · A heart valve problem. Blood travels through the chambers of the heart as it is pumped. Heart valves open and close to help move blood in the right direction. But a valve may not open or close fully, if its hardened or scarred. As a result, less blood is pumped through the heart to the brain and body. Most often, syncope occurs when a person's aortic valve is critically narrowed and he or she participates in  a strenuous activity.  · A heart muscle problem. Some people develop a thickened heart muscle that blocks blood flow out of the heart to the body. This is called hypertrophic cardiomyopathy. Being dehydrated and having hypertrophic cardiomyopathy can increase the risk for syncope.  Whatever the cause of syncope, it is important to be evaluated by your healthcare provider. You may need to be seen by a cardiologist, neurologist, or an ear, nose, and throat specialist. Do not drive, operate heavy machinery, or participate in activities in which you would be at risk for falls and injury if you have syncope and have not been evaluated.  Date Last Reviewed: 5/1/2016  © 7489-4657 [employer]. [address] [place] [address], [place] [address]. All rights reserved. This information is not intended as a substitute for professional medical care. Always follow your healthcare professional's instructions.

## 2018-08-30 NOTE — PROGRESS NOTES
"Subjective:       Patient ID: Martha Peres is a 85 y.o. female.    Chief Complaint: Nausea (passed out Monday)    Patient who is new to me presents with nausea (dry heaving). Patient was complaining that she all day Monday that she felt like she was 'going to pass out." The patient asked to use the toilet. Her daughter-in-law picked her up to put her on the toilet and patient passed out. Her daughter helped her into the chair. She lost consciousness for a few seconds. Called EMS and refused to go to the ED. Patient has headaches with it and is relieved by tylenol. Her last BM was yesterday, no abdominal pain with nausea.       Nausea   This is a new problem. Episode onset: on and off since monday. The problem occurs daily. The problem has been waxing and waning. Associated symptoms include coughing, headaches, nausea and weakness. Pertinent negatives include no abdominal pain, arthralgias, chest pain, chills, congestion, fatigue, fever, joint swelling, myalgias, numbness, rash, sore throat or vomiting. Associated symptoms comments: sitting up in chair feels weak. . Nothing aggravates the symptoms.     Review of Systems   Constitutional: Negative for chills, fatigue and fever.   HENT: Negative for congestion, sinus pressure, sinus pain, sneezing and sore throat.    Respiratory: Positive for cough. Negative for chest tightness, shortness of breath and wheezing.    Cardiovascular: Negative for chest pain, palpitations and leg swelling.   Gastrointestinal: Positive for nausea. Negative for abdominal distention, abdominal pain, constipation, diarrhea and vomiting.   Genitourinary: Negative for decreased urine volume, difficulty urinating, dysuria, frequency and urgency.   Musculoskeletal: Negative for arthralgias, gait problem, joint swelling and myalgias.   Skin: Negative for rash and wound.   Neurological: Positive for dizziness, weakness and headaches. Negative for light-headedness and numbness.       Objective:    "   Physical Exam   Constitutional: She is oriented to person, place, and time. She appears well-developed.   Frail elderly woman in a wheel chair   HENT:   Head: Normocephalic and atraumatic.   Right Ear: External ear normal.   Left Ear: External ear normal.   Nose: Nose normal.   Mouth/Throat: Oropharynx is clear and moist.   Eyes: Pupils are equal, round, and reactive to light.   Neck: Normal range of motion.   Cardiovascular: Normal rate, regular rhythm and intact distal pulses.   Pulmonary/Chest: Effort normal and breath sounds normal.   Abdominal: Soft. Bowel sounds are normal. There is no tenderness.   Musculoskeletal: Normal range of motion.   hemiparesis noted to RAMANA   Neurological: She is alert and oriented to person, place, and time. She is not disoriented.   Fine head tremor noted   Skin: Skin is warm and dry.   Nursing note and vitals reviewed.      Assessment:       1. Nausea    2. Loss of consciousness    3. Syncope, unspecified syncope type    4. Hypertension, unspecified type    5. History of CVA (cerebrovascular accident)    6. Hemiparesis affecting left side as late effect of cerebrovascular accident    7. Abnormal EKG        Plan:       Nausea  -     URINALYSIS  -     CBC auto differential; Future; Expected date: 08/30/2018  -     Basic metabolic panel; Future; Expected date: 08/30/2018  -     ondansetron (ZOFRAN-ODT) 8 MG TbDL; Take 1 tablet (8 mg total) by mouth every 8 (eight) hours as needed.  Dispense: 6 tablet; Refill: 0    Loss of consciousness  -     Ambulatory referral to Cardiology    Syncope, unspecified syncope type  -     IN OFFICE EKG 12-LEAD (to [place])  -     Ambulatory referral to Cardiology    Hypertension, unspecified type  Blood pressure low will encourage fluids.     History of CVA (cerebrovascular accident)    Hemiparesis affecting left side as late effect of cerebrovascular accident    Abnormal EKG  -     Ambulatory referral to Cardiology      Discussed with patient that if  symptoms worsen to go to the ED for evaluation. Will have patient follow up with cardiology for further workup regarding syncopal episode. Patient to increase fluids will follow up regarding lab results. Patient to follow up with PCP with any new or concerning symptoms.

## 2018-08-31 ENCOUNTER — OUTPATIENT CASE MANAGEMENT (OUTPATIENT)
Dept: ADMINISTRATIVE | Facility: OTHER | Age: 83
End: 2018-08-31

## 2018-08-31 ENCOUNTER — TELEPHONE (OUTPATIENT)
Dept: CARDIOLOGY | Facility: CLINIC | Age: 83
End: 2018-08-31

## 2018-08-31 NOTE — TELEPHONE ENCOUNTER
----- Message from Lottie Crystal sent at 8/31/2018  3:25 PM CDT -----  Contact: self   Patient want to know if you can put orders in for ultra sound, any questions please call back at [phone] (home)

## 2018-08-31 NOTE — TELEPHONE ENCOUNTER
----- Message from Nicole Sotelo sent at 8/31/2018  2:54 PM CDT -----  Contact: Pt  Name of Who is Calling: Martha      What is the request in detail: Patient is calling requesting to speak with a nurse      Can the clinic reply by [facility]: no      [facility]: .[phone] (home)

## 2018-08-31 NOTE — PROGRESS NOTES
Please call the patient and let her know that her labs are within an acceptable range. Her anemia is stable. Thanks.

## 2018-09-06 ENCOUNTER — TELEPHONE (OUTPATIENT)
Dept: GASTROENTEROLOGY | Facility: CLINIC | Age: 83
End: 2018-09-06

## 2018-09-06 NOTE — TELEPHONE ENCOUNTER
----- Message from Traci Martin sent at 9/6/2018 10:36 AM CDT -----  Contact: Self  Patient is returning your call to schedule her colonoscopy.  Call back at [phone] (home).  Thanks

## 2018-09-06 NOTE — TELEPHONE ENCOUNTER
Pt states had egd was normal. Is having dark stools. Offered to sched colon. States will discuss with family member and call back

## 2018-09-07 RX ORDER — GABAPENTIN 300 MG/1
300 CAPSULE ORAL 3 TIMES DAILY
Qty: 90 CAPSULE | Refills: 3 | Status: SHIPPED | OUTPATIENT
Start: 2018-09-07 | End: 2019-04-10 | Stop reason: SDUPTHER

## 2018-09-07 NOTE — TELEPHONE ENCOUNTER
----- Message from Meño Sepulveda sent at 9/7/2018 10:43 AM CDT -----  Type:  RX Refill Request    Who Called:  Patient  Refill or New Rx:  Refill  RX Name and Strength:  gabapentin (NEURONTIN) 300 MG capsule  How is the patient currently taking it? (ex. 1XDay):  3XDay  Is this a 30 day or 90 day RX:  90  Preferred Pharmacy with phone number:    [address] [address]  Phone: [phone] Fax: [phone]      Local or Mail Order:  Local  Ordering Provider:  Janes Aleman Call Back Number:  [phone]

## 2018-09-07 NOTE — PROGRESS NOTES
Summary:  (1st Attempt)  RN-CM attempted to contact patient/caregiver to follow up for OPCM. Left message requesting return call. Will attempt to contact patient at a later date. - CORBY Gomes RN-OPCM    Interventions:  - Scheduled patient for follow up call next week.    Plan:  - Follow up with Pain Management regarding rescheduling MYLES follow up appointment. - 8/16/18 Appt scheduled with KACI Nogueira for 8/29/18 1:30PM  - Follow up with PCP regarding scheduling an appointment, order for walker and medication discrepancy questions. - 8/16/18 PCP sent in RX for lisinopril, scheduling appt and will eval for walker at that time per MD response  - Complete OTC portion of Medication Reconciliation with son.   - Follow up on receipt of resource materials for Stroke, MYLES, Pain Management and Advance Directive/POA re-mailed to patient on 8/16/18.  - Assess for outcome of Left L5/S1 transforaminal epidural steroid injection on 7/30/18 with Dr. Darinel Carrion. - 8/15/18 Still having pain in lower back, radiating to left hip. Missed F/U appointment with PA on 8/9/18 - Needs to re-schedule. - 8/16/18 Appt scheduled with KACI Nogueira for 8/29/18 1:30PM  - Educate patient on signs and symptoms of Stroke and importance of seeking immediate medical attention if symptoms are present. - Done 8/15/18  - Assess for retention of FAST pneumonic (stroke symptoms).  - Educate on Pain Management and Alternative Therapies.        Clinical Reference Documents Added to Patient Instructions       Document    INJECTION, LUMBAR EPIDURAL: RECOVERY AT HOME (ENGLISH)    PAIN, COMPLEMENTARY CARE FOR (ENGLISH)    STROKE AND HIGH BLOOD PRESSURE, UNDERSTANDING THE LINK BETWEEN (ENGLISH)    STROKE, PREVENTING RECURRENT WITH A HEALTHIER LIFESTYLE (ENGLISH)    STROKE, PREVENTING RECURRENT: EATING HEALTHY (ENGLISH)    STROKE, SYMPTOMS (ENGLISH)

## 2018-09-10 ENCOUNTER — OUTPATIENT CASE MANAGEMENT (OUTPATIENT)
Dept: ADMINISTRATIVE | Facility: OTHER | Age: 83
End: 2018-09-10

## 2018-09-10 RX ORDER — HYDROCODONE BITARTRATE AND ACETAMINOPHEN 10; 325 MG/1; MG/1
1 TABLET ORAL EVERY 6 HOURS PRN
Qty: 60 TABLET | Refills: 0 | Status: SHIPPED | OUTPATIENT
Start: 2018-09-10 | End: 2018-09-26 | Stop reason: SDUPTHER

## 2018-09-10 NOTE — TELEPHONE ENCOUNTER
----- Message from Ailyn Mckenna RN sent at 9/10/2018 10:14 AM CDT -----  Contact: SIXTO Weir  Good morning. This is Meka Gomes. I am a Registered Nurse with [facility] Case Management Team. I received a referral on the above patient. I spoke with the patient today on the telephone and she is having quite a bit of pain. She canceled her last appointment with you on 8/29/18 as she was sick that day.  She has a lot of trouble with transportation and is requesting, if possible, to reschedule the appointment on the same day as another appointment at the [facility]. She has an appointment scheduled on 10/4/18 at 3:40pm with another MD. Is there any way you could scheduled patient that same day at about 2:00pm? Please advise.     Kindest Regards,  Meka Gomes RN-[facility]  [phone]

## 2018-09-10 NOTE — PROGRESS NOTES
09/10/2018  Summary:  RN-CM contacted patient to follow up for OPCM. Spoke with patient via telephone. Patient states that she is having a lot of back pain and reported that she missed her rescheduled follow up appointment with KACI Nogueira on 8/29/18 because she was sick. Patient has not followed up post MYLES performed on 7/30/18 by Dr. Carrion. Patient states that it did not help her very much. We briefly reviewed the information on Pain Management:   Ice reduces muscle pain and swelling. It helps most during the first 24 to 48 hours after an injury  · Wrap an ice pack or a bag of frozen peas in a thin towel. (Never place ice directly on your skin.)  · Place the ice where your back hurts the most.  · Dont ice for more than 20 minutes at a time.  · You can use ice several times a day.  Over-the-counter pain relievers can include acetaminophen and anti-inflammatory medicines, which includes aspirin or ibuprofen. They can help ease discomfort. Some also reduce swelling.  · Tell your healthcare provider about any medicines you are already taking.  · Take medicines only as directed.  After the first 48 hours, heat can relax sore muscles and improve blood flow.  · Try a warm bath or shower. Or use a heating pad set on low. To prevent a burn, keep a cloth between you and the heating pad.  · Dont use a heating pad for more than 15 minutes at a time. Never sleep on a heating pad.    Patient verbalized that she uses these modes, which help for short periods of time, but does not last long. We went over Alternative Therapies to help with pain:  1) Massage can increase circulation and relaxation. This can help relieve stress and pain. 2) Biofeedback uses instruments to measure the body's physiological activity, like heart rate and muscle activity. This information is used to help you learn to control certain functions, such as relaxing muscles and helping reduce pain. 3) Chiropractic adjusts the spine and  "joints. It may help reduce back, neck, or joint pain. Chiropractic may also use mild electrical stimulation, massage, heat, or ultrasound (sound waves). 4) Acupuncture uses thin needles to help treat pain. The treatment may release the bodys own painkillers. 5) Distraction helps you focus on something besides pain. Try reading a book, watching a movie, or talking with family. Or visit a local attraction. 6) Meditation helps you focus on words, objects, or ideas. Doing this can calm you and decrease stress. 7) Relaxation includes methods like listening to soothing music or relaxation tapes. You might try slow, deep breathing. Imagine a calm scene, like an ocean or mountain, as you breathe. Patient states that she is not interested in these as she cannot afford them and the "free ones" do "nothing to help my pain". Encouraged patient to try at least one alternative therapy like Relaxation or Distraction.     We reviewed the S/S of stroke and patient denies any current or recent signs or symptoms. We reviewed the FAST pneumonic and patient was not able to articulate independently. Referred patient to MICHAEL paperwork for review. Will continue to review at next all. We reviewed patient's upcoming scheduled appointments and encouraged patient to maintain all scheduled appointments for doctor visits, lab works and procedures. Patient stated that she was not aware of an appointment scheduled today at 3:15 for a Echocardiogram ordered by Dr. De Leon on 8/30/18. Patient states that she will have to check to see if she can get a ride and if she can't, she will cancel the test and re-schedule for another date. Will update status of appointment with Roshan Blankenship when I have a response. Will continue to follow. - CORBY Gomes RN-OPCM    Interventions:  - Sent in-basket message to KACI Nogueira to request f/u appointment on 10/4/18 when patient has to come to clinic for another appointment. - 9/10/18 Rec'd " message from Toby Blankenship that his staff will reach out to patient to reschedule.   - Reviewed Pain management techniques (non-invasive and alternative methods).  - Reviewed S/S of stroke.  - Reviewed FAST pneumonic.   - Reviewed patient's upcoming scheduled appointments and encouraged patient to maintain all scheduled appointments for doctor visits, lab works and procedures.   - Mailed patient a list of upcoming scheduled appointments.     Plan:  - Follow up with Pain Management regarding rescheduling MYLES follow up appointment. - 8/16/18 Appt scheduled with KACI Nogueira for 8/29/18 1:30PM  - Follow up with PCP regarding scheduling an appointment, order for walker and medication discrepancy questions. - 8/16/18 PCP sent in RX for lisinopril, scheduling appt and will eval for walker at that time per MD response  - Complete OTC portion of Medication Reconciliation with son. - 9/10/18 Son is not available to review OTC medications at this time.   - Follow up on receipt of resource materials for Stroke, MYLES, Pain Management and Advance Directive/POA re-mailed to patient on 8/16/18. - 9/10/18 Information received  - Assess for outcome of Left L5/S1 transforaminal epidural steroid injection on 7/30/18 with Dr. Darinel Carrion. - 8/15/18 Still having pain in lower back, radiating to left hip. Missed F/U appointment with PA on 8/9/18 - Needs to re-schedule. - 8/16/18 Appt scheduled with KAIC Nogueira for 8/29/18 1:30PM - 9/10/18 Patient canceled appt as she was sick.   - Educate patient on signs and symptoms of Stroke and importance of seeking immediate medical attention if symptoms are present. - Done 8/15/18, 9/10/18  - Assess for retention of FAST pneumonic (stroke symptoms). - 9/10/18 Unable to remember. Will continue to review.  - Assess for retention of pain management techniques, non-invasive and alternative methods.   - Assess for any additional needs.

## 2018-09-10 NOTE — TELEPHONE ENCOUNTER
----- Message from Elvis Wilder sent at 9/10/2018  8:29 AM CDT -----  Contact: patient  Type:  RX Refill Request    Who Called:  patient  Refill or New Rx:  refill  RX Name and Strength:  HYDROcodone-acetaminophen (NORCO)  mg per tablet 60 tablet   How is the patient currently taking it? (ex. 1XDay):    Is this a 30 day or 90 day RX:    Preferred Pharmacy with phone number:  [facility]  Local or Mail Order:  local  Ordering Provider:  Dr.Harrison Aleman Call Back Number:  [phone]  Additional Information:  Requesting a call back when script has been sent

## 2018-09-13 ENCOUNTER — TELEPHONE (OUTPATIENT)
Dept: PAIN MEDICINE | Facility: CLINIC | Age: 83
End: 2018-09-13

## 2018-09-13 NOTE — TELEPHONE ENCOUNTER
----- Message from KACI Rodriguez sent at 9/10/2018 10:26 AM CDT -----  Contact: SIXTO Weir      ----- Message -----  From: Ailyn Mckenna RN  Sent: 9/10/2018  10:14 AM  To: KACI Rodriguez, #    Good morning. This is Meka Gomes. I am a Registered Nurse with [facility]. I received a referral on the above patient. I spoke with the patient today on the telephone and she is having quite a bit of pain. She canceled her last appointment with you on 8/29/18 as she was sick that day.  She has a lot of trouble with transportation and is requesting, if possible, to reschedule the appointment on the same day as another appointment at the [facility]. She has an appointment scheduled on 10/4/18 at 3:40pm with another MD. Is there any way you could scheduled patient that same day at about 2:00pm? Please advise.     Kindest Regards,  Meka Gomes RN-ROSI  [phone]

## 2018-09-22 ENCOUNTER — TELEPHONE (OUTPATIENT)
Dept: FAMILY MEDICINE | Facility: CLINIC | Age: 83
End: 2018-09-22

## 2018-09-23 RX ORDER — RABEPRAZOLE SODIUM 20 MG/1
TABLET, DELAYED RELEASE ORAL
Qty: 30 TABLET | Refills: 0 | Status: SHIPPED | OUTPATIENT
Start: 2018-09-23 | End: 2018-10-25 | Stop reason: SDUPTHER

## 2018-09-26 ENCOUNTER — TELEPHONE (OUTPATIENT)
Dept: PAIN MEDICINE | Facility: CLINIC | Age: 83
End: 2018-09-26

## 2018-09-26 ENCOUNTER — OUTPATIENT CASE MANAGEMENT (OUTPATIENT)
Dept: ADMINISTRATIVE | Facility: OTHER | Age: 83
End: 2018-09-26

## 2018-09-26 RX ORDER — HYDROCODONE BITARTRATE AND ACETAMINOPHEN 10; 325 MG/1; MG/1
1 TABLET ORAL EVERY 6 HOURS PRN
Qty: 60 TABLET | Refills: 0 | Status: SHIPPED | OUTPATIENT
Start: 2018-09-26 | End: 2018-10-11 | Stop reason: SDUPTHER

## 2018-09-26 NOTE — PROGRESS NOTES
09/26/2018  Summary:  RNBRADEN contacted patient to follow up for OPCM. Spoke with Ms. Peres via telephone. She reports that she is not feeling well at this time. She states that she is experiencing severe back pain (8 out of 10 on the pain scale) from her lower back and into her left hip. Patient states that she has not been able to see Dr. Carrion or the PA, Roshan Blankenship, since the day she received an MYLES with Dr. Carrion on 7/30/18. Patient has been taking Norco 10 every 6 hours for pain, but currently does not have any pain medication. She reports experiencing severe pain, anxiety, nausea and loss of appetite at this time. Patient states that she doesn't know why she ran out of medication because she recently filled it at [facility]. I contacted [facility] and spoke with Martin (?). He checked patient's profile and confirmed that patient's [place] was picked up on 9/10/18, the quantity was 60 tablets. Patient is taking 4 Norco per day for pain, so would have run out yesterday. Patient has missed 2 follow up appointments with Pain Management, once due to no transportation and rescheduled, and once due to illness and rescheduled for 10/4/18. Patient states that if it gets much worse, she will have to go the ED. Sent urgent message to Dr. Darinel Carrion and AKCI Nogueira for recommendations for patient. I am concerned that patient may be having withdrawal symptoms due to not taking any medication for the last 24 hours. Advised patient that if symptoms worsened, she should go to the ED for evaluation; verbalized understanding. I have asked Dr. Carrion, KACI Nogueira and office staff to reach out to patient with their recommendations and to e-script medication if possible to patient's pharmacy until she can get in to see provider on 10/4/18. We reviewed the signs and symptoms of Stroke using the FAST pneumonic and patient verbalized understanding. She denies any current  symptoms of stroke. Reviewed non-invasive pain management techniques with patient: ice, medication and heat. Patient states that nothing is really helping her right now. Will follow up with Dr. Carrion's office regarding message sent today. Again advised patient to go to the ED if symptoms worsen or do not improve; verbalized understanding. Encouraged patient to contact this RN-CM or OOC after hours for any questions, needs or concerns; verbalized understanding. Will continue to follow. - CORBY Gomes, RN-OPCM    Interventions:  - Sent urgent message to Murali/Krzysztof/Office Staff regarding patient's pain level (8/10) and symptoms requesting reach out to patient with recommendations.  - Advised patient to go to ED if symptoms do not improve or worsen.  - Reviewed non-invasive pain management techniques with patient.  - Reviewed FAST pneumonic for stroke symptoms with patient.   - Reviewed and mailed list of upcoming scheduled appointments to patient's home.     Plan:  - Follow up with Dr. Carrion's office regarding patient's request for pain medication refill. - 9/26/10 RX called in as requested to [facility], patient updated by MD's office.  - Follow up with Pain Management regarding rescheduling MYLES follow up appointment. - 8/16/18 Appt scheduled with KACI Nogueira for 8/29/18 1:30PM, Rescheduled for 10/4/18  - Follow up with PCP regarding scheduling an appointment, order for walker and medication discrepancy questions. - 8/16/18 PCP sent in RX for lisinopril, scheduling appt and will eval for walker at that time per MD response  - Complete OTC portion of Medication Reconciliation with son. - 9/10/18, 9/26/18 Son is not available to review OTC medications at this time.   - Follow up on receipt of resource materials for Stroke, MYLES, Pain Management and Advance Directive/POA re-mailed to patient on 8/16/18. - 9/10/18 Information received  - Assess for outcome of Left L5/S1 transforaminal  epidural steroid injection on 7/30/18 with Dr. Darinel Carrion. - 8/15/18 Still having pain in lower back, radiating to left hip. Missed F/U appointment with PA on 8/9/18 - Needs to re-schedule. - 8/16/18 Appt scheduled with KACI Nogueira for 8/29/18 1:30PM - 9/10/18 Patient canceled appt as she was sick. - Appt rescheduled on 10/4/18  - Educate patient on signs and symptoms of Stroke and importance of seeking immediate medical attention if symptoms are present. - Done 8/15/18, 9/10/18, 9/26/18   - Assess for retention of FAST pneumonic (stroke symptoms). - 9/10/18 Unable to remember. - 9/26/18 Reviewed, no retention  - Assess for retention of pain management techniques, non-invasive and alternative methods. - 9/26/18 Reviewed, no retention  - Assess for any additional needs.

## 2018-09-26 NOTE — TELEPHONE ENCOUNTER
----- Message from Ailyn Mckenna RN sent at 9/26/2018  3:46 PM CDT -----  Contact: Meka Gomes RN-OPC  Good afternoon. This is Meka Gomes RN with [facility]  Team.  I spoke with the patient today on the telephone and she is reporting a pain level of 8 out of 10 in her lower back into the left hip. Patient states that she does not have anything for pain other than Tylenol. I checked with patient's pharmacy, [facility]. Patient picked up 60 tablets on 9/10/18 and is taking one tablet every 6 hours as directed. Patient has used up the 15 day supply. This patient is experiencing severe pain, anxiety, nausea  and loss of appetite at this time, all signs of withdrawal. She relies on her son for transportation and has had to miss her last 2 scheduled appointments, once for transportation and once for illness. We were able to reschedule her appointment with KACI Nogueira on 10/4/18 as patient already had transportation set up for an appointment the same day with Dr. Marrero. Patient had an MYLES with Dr. Carrion on 7/30/18 and reports that she has not gotten relief from that procedure. If possible, can you please send an e-script to [facility] for [place] for patient until she can get in to see KACI Nogueira on 10/4/18. Please advise. Please notify patient of your recommendations.    Kindest Regards,  Meka Gomes RN-ZANDER  [phone] or [phone]

## 2018-09-26 NOTE — TELEPHONE ENCOUNTER
----- Message from Ailyn Mckenna RN sent at 9/26/2018  3:46 PM CDT -----  Contact: Meka Gomes RN-OPC  Good afternoon. This is Meka Gomes RN with [facility]  Team.  I spoke with the patient today on the telephone and she is reporting a pain level of 8 out of 10 in her lower back into the left hip. Patient states that she does not have anything for pain other than Tylenol. I checked with patient's pharmacy, [facility]. Patient picked up 60 tablets on 9/10/18 and is taking one tablet every 6 hours as directed. Patient has used up the 15 day supply. This patient is experiencing severe pain, anxiety, nausea  and loss of appetite at this time, all signs of withdrawal. She relies on her son for transportation and has had to miss her last 2 scheduled appointments, once for transportation and once for illness. We were able to reschedule her appointment with KACI Nogueira on 10/4/18 as patient already had transportation set up for an appointment the same day with Dr. Marrero. Patient had an MYLES with Dr. Carrion on 7/30/18 and reports that she has not gotten relief from that procedure. If possible, can you please send an e-script to [facility] for [place] for patient until she can get in to see KACI Nogueira on 10/4/18. Please advise. Please notify patient of your recommendations.    Kindest Regards,  Meka Gomes RN-ZANDER  [phone] or [phone]

## 2018-10-04 ENCOUNTER — OFFICE VISIT (OUTPATIENT)
Dept: NEUROLOGY | Facility: CLINIC | Age: 83
End: 2018-10-04
Payer: MEDICARE

## 2018-10-04 ENCOUNTER — OFFICE VISIT (OUTPATIENT)
Dept: PAIN MEDICINE | Facility: CLINIC | Age: 83
End: 2018-10-04
Payer: MEDICARE

## 2018-10-04 VITALS — SYSTOLIC BLOOD PRESSURE: 130 MMHG | TEMPERATURE: 98 F | HEART RATE: 80 BPM | DIASTOLIC BLOOD PRESSURE: 74 MMHG

## 2018-10-04 VITALS
SYSTOLIC BLOOD PRESSURE: 145 MMHG | DIASTOLIC BLOOD PRESSURE: 66 MMHG | BODY MASS INDEX: 24.31 KG/M2 | HEART RATE: 73 BPM | RESPIRATION RATE: 20 BRPM | HEIGHT: 62 IN

## 2018-10-04 DIAGNOSIS — M47.816 LUMBAR SPONDYLOSIS: Primary | ICD-10-CM

## 2018-10-04 DIAGNOSIS — M51.36 DDD (DEGENERATIVE DISC DISEASE), LUMBAR: ICD-10-CM

## 2018-10-04 DIAGNOSIS — R26.89 IMBALANCE: ICD-10-CM

## 2018-10-04 DIAGNOSIS — I65.29 STENOSIS OF CAROTID ARTERY, UNSPECIFIED LATERALITY: ICD-10-CM

## 2018-10-04 DIAGNOSIS — G25.0 ESSENTIAL TREMOR: ICD-10-CM

## 2018-10-04 DIAGNOSIS — M54.16 LUMBAR RADICULOPATHY: ICD-10-CM

## 2018-10-04 DIAGNOSIS — G81.94 LEFT HEMIPARESIS: ICD-10-CM

## 2018-10-04 DIAGNOSIS — I67.2 CEREBRAL ATHEROSCLEROSIS: ICD-10-CM

## 2018-10-04 DIAGNOSIS — Z86.73 HISTORY OF STROKE: Primary | ICD-10-CM

## 2018-10-04 PROCEDURE — 99999 PR PBB SHADOW E&M-EST. PATIENT-LVL V: CPT | Mod: PBBFAC,,, | Performed by: PHYSICIAN ASSISTANT

## 2018-10-04 PROCEDURE — 99215 OFFICE O/P EST HI 40 MIN: CPT | Mod: S$PBB,,, | Performed by: PSYCHIATRY & NEUROLOGY

## 2018-10-04 PROCEDURE — 99215 OFFICE O/P EST HI 40 MIN: CPT | Mod: PBBFAC,PN | Performed by: PHYSICIAN ASSISTANT

## 2018-10-04 PROCEDURE — 99214 OFFICE O/P EST MOD 30 MIN: CPT | Mod: PBBFAC,27,PN | Performed by: PSYCHIATRY & NEUROLOGY

## 2018-10-04 PROCEDURE — 99999 PR PBB SHADOW E&M-EST. PATIENT-LVL IV: CPT | Mod: PBBFAC,,, | Performed by: PSYCHIATRY & NEUROLOGY

## 2018-10-04 PROCEDURE — 99214 OFFICE O/P EST MOD 30 MIN: CPT | Mod: S$PBB,,, | Performed by: PHYSICIAN ASSISTANT

## 2018-10-04 RX ORDER — SODIUM CHLORIDE, SODIUM LACTATE, POTASSIUM CHLORIDE, CALCIUM CHLORIDE 600; 310; 30; 20 MG/100ML; MG/100ML; MG/100ML; MG/100ML
INJECTION, SOLUTION INTRAVENOUS CONTINUOUS
Status: CANCELLED | OUTPATIENT
Start: 2018-10-19

## 2018-10-04 RX ORDER — DORZOLAMIDE HCL 20 MG/ML
SOLUTION/ DROPS OPHTHALMIC
Qty: 10 ML | Refills: 1 | OUTPATIENT
Start: 2018-10-04

## 2018-10-04 NOTE — PROGRESS NOTES
"Date of service:  10/4/2018    Chief complaint:  Stroke, tremor, imbalance    Interval history:  The patient is an 85 y.o. female seen previously for complaints of imbalance and essential tremor.  I last saw her in 2016.  Since she was last here, she reports no new issues.      She had previously suffered a stroke that produced a left hemiparesis.  She reports that she has not had much recovery from this.  She continues to require a wheelchair.  She did not see PM&R as previously discussed.      She has been taking Neurontin 300 mg TID for her essential tremor.  She feels that this has helped.  She is satisfied with her current degree of symptom control.  She denies side effects.    Any change to her previous issues with imbalance are difficult to gauge as she is now using a wheelchair.    History of present illness:  The patient is a 85 y.o. female referred for evaluation of imbalance. This issue began a couple of years ago and has gradually gotten worse over time.  The complaint is characterized as occasional "staggering" when walking with sudden falls.  She denies vertigo and symptoms of near syncope.  It is severe in intensity.  She notes no exacerbating factors.  She notes no clear relieving factors, though using a walker does make her less likely to actually fall.  She has noticed no associated symptoms.  She does, though, endorse some back pain.  The imbalance is present for a matter of seconds at a time according to the patient.   The patient's has imbalance on a daily basis.  She seems to be having falls every few weeks.     Also of note, the patient previously saw Dr. Shabazz for ET.  It appears she was last seen in 2/12.  The patient reports a longstanding, gradually progressive action/intention tremor.  Her father apparently had a similar tremor.  She gets some relief from propranolol.  She notes no side effects from this drug.      Past Medical History:   Diagnosis Date    Anticoagulant long-term use     " Arthritis     Benign essential tremor 2000    Bilateral    Breast cancer     left , no B/p or sticks to left    Cataract     ou done    Colon polyps     Coronary artery disease     2 stents     DDD (degenerative disc disease), lumbar     Encounter for blood transfusion     GERD (gastroesophageal reflux disease)     Glaucoma     left eye    Hemiparesis affecting left side as late effect of cerebrovascular accident     HLD (hyperlipidemia)     HTN (hypertension)     Hyperparathyroidism     Hypothyroidism     Mobility impaired     uses wheelchair    Neck pain     radiating to both shoulders and arms    Osteoporosis     PVD (peripheral vascular disease)     Stroke     August 2016       Past Surgical History:   Procedure Laterality Date    APPENDECTOMY      BLADDER SUSPENSION      BREAST LUMPECTOMY      no bp/iv left arm    CARDIAC SURGERY      COLONOSCOPY      CORONARY ANGIOPLASTY WITH STENT PLACEMENT      x 2    EGD (ESOPHAGOGASTRODUODENOSCOPY) N/A 7/2/2018    [facility]    Epidural steroid injection      Pain managment    ESOPHAGOGASTRODUODENOSCOPY N/A 7/2/2018    Procedure: EGD (ESOPHAGOGASTRODUODENOSCOPY);  Surgeon: Jassi Randolph MD;  Location: [facility];  Service: Endoscopy;  Laterality: N/A;    EYE SURGERY      bilat phaco with iol    INJECTION, STEROID, SPINE, CERVICAL, EPIDURAL N/A 1/20/2014    Per[facility]inel [facility]ion [facility] at [facility]    INJECTION, STEROID, SPINE, CERVICAL, EPIDURAL N/A 11/18/2013    Per[facility] at [facility]    INJECTION, STEROID, SPINE, CERVICAL, EPIDURAL N/A 8/27/2013    [facility] at [facility]    INJECTION, STEROID, SPINE, CERVICAL, EPIDURAL N/A 7/30/2013    Performed [facility] at [facility]    Injection,steroid,epidural,transforaminal approach L5/S1 Left 7/30/2018    Per[facility] at [facility]    INJECTION-STEROID-EPIDURAL-CAUDAL  N/A 1/2/2018    [facility] OR    INJECTION-STEROID-EPIDURAL-CAUDAL N/A 10/28/2016    [facility] at [facility]    INJECTION-STEROID-EPIDURAL-CERVICAL N/A 7/8/2016    [facility] at [facility]    INJECTION-STEROID-EPIDURAL-CERVICAL N/A 12/22/2015    [facility] at [facility]    INJECTION-STEROID-EPIDURAL-CERVICAL N/A 9/2/2015    [facility] at [facility]    INJECTION-STEROID-EPIDURAL-CERVICAL N/A 8/15/2014    [facility] at [facility]    INJECTION-STEROID-EPIDURAL-LUMBAR N/A 10/7/2015    Per[facility] by [facility] at [facility]    TONSILLECTOMY      TOTAL ABDOMINAL HYSTERECTOMY         Family History   Problem Relation Age of Onset    Essential Tremor Father     Stroke Father     Heart attack Father     Essential Tremor Sister     Stroke Sister     Essential Tremor Brother     Stroke Brother     Heart attack Brother     Stroke Mother     Heart attack Mother     Stroke Brother     Heart attack Brother     Stroke Brother     Heart attack Brother     Stroke Brother     Heart attack Brother     Stroke Brother     Heart attack Brother     Stroke Sister     Heart disease Sister     Stroke Sister     Heart attack Sister     Stroke Sister     Heart attack Sister     Essential Tremor Daughter     Essential Tremor Son     Anesthesia problems Neg Hx     Clotting disorder Neg Hx     Amblyopia Neg Hx     Blindness Neg Hx     Cancer Neg Hx     Cataracts Neg Hx     Diabetes Neg Hx     Glaucoma Neg Hx     Hypertension Neg Hx     Macular degeneration Neg Hx     Retinal detachment Neg Hx     Strabismus Neg Hx        Social history:  Social History     Socioeconomic History    Marital status:      Spouse name: Not on file    Number of children: Not on file    Years of education: Not on file    Highest education level: Not on file   Social  "Needs    Financial resource strain: Not on file    Food insecurity - worry: Not on file    Food insecurity - inability: Not on file    Transportation needs - medical: Not on file    Transportation needs - non-medical: Not on file   Occupational History    Not on file   Tobacco Use    Smoking status: Never Smoker    Smokeless tobacco: Never Used   Substance and Sexual Activity    Alcohol use: No    Drug use: No    Sexual activity: Not on file   Other Topics Concern    Not on file   Social History Narrative    Not on file        Review of Systems  General/Constitutional:  No unintentional weight loss, No change in appetite  Eyes/Vision:  + change in vision, No double vision  ENT:  No frequent nose bleeds, + ringing in the ears  Respiratory:  No cough, No wheezing  Cardiovascular:  No chest pain, No palpitations  Gastrointestinal:  No jaundice, No nausea/vomiting  Genitourinary:  No incontinence, No burning with urination  Hematologic/Lymphatic:  + easy bruising/bleeding, No night sweats  Neurological:  No numbness, + weakness  Endocrine:  No fatigue, No heat/cold intolerance  Allergy/Immunologic:  No fevers, No chills  Musculoskeletal:  + muscle pain, + joint pain   Psychiatric:  No thoughts of harming self/others, No depression  Integumentary:  No rashes, No sores that do not heal     Physical exam:  BP (!) 145/66   Pulse 73   Resp 20   Ht 5' 2" (1.575 m)   BMI 24.31 kg/m²    General: Well developed, well nourished. No acute distress.  HEENT: Atraumatic, normocephalic.  Neck: Supple, trachea midline.  Cardiovascular: Regular rate and rhythm.  Pulmonary: No increased work of breathing.  Abdomen/GI: No guarding.  Musculoskeletal: No obvious joint deformities, moves all extremities well.     Neurological exam:  Mental status: Awake and alert. Oriented x3. Speech fluent and appropriate. Recent and remote memory appear to be intact. Fund of knowledge normal.  Cranial nerves: Pupils equal round and " "reactive to light, extraocular movements intact, mild left facial weakness, facial sensation intact bilaterally, palate and tongue midline, hearing grossly intact bilaterally.  Motor: 5 out of 5 strength throughout the upper and lower extremities the right side, left hemiparesis. Normal bulk.  Some spasticity on the left.  Sensation: Intact to light touch and temperature bilaterally.  DTR: 1+ at the knees and biceps bilaterally.  Coordination: Limited cooperation with FNF testing.  Gait: Not assessed due to fall risk.    Data base:  Notes from her hospitalization were reviewed.  These discussed her stroke from August.    Labs (8/16):  FLP: HDL=26, LDL-47    EMG (7/15):  "IMPRESSION: Borderline amplitude of right tibial and peroneal motor amplitudes could be suggestive of a remote lumbosacral radiculopathy. The remainder of the study was normal. There is no significant electrophysiologic evidence to suggest a peripheral neuropathy."    MRI brain (8/16):  Stroke within territory of R MCA  I independently visualized and interpreted this study.      MRI brain (3/15):  "1. No acute intracranial abnormalities are appreciated. Specifically, no evidence of acute intracranial trauma.  2. Age-appropriate chronic microvascular ischemic changes within the periventricular white matter and age appropriate cerebral volume loss.  3. Incidentally observed degenerative change of the cervical spine."    MRA brain (8/16):  R MCA occlusion     Carotid U/S (8/16):  50-69% stenosis bilaterally     TTE (8/16):  No cardiac source of emboli    MRI L-spine (6/15):  "1. Multilevel degenerative change of the lumbar spine antegrade II anterolisthesis of L5 on S1 resulting in at least moderate left neuroforaminal stenosis at L5-S1. Please correlate clinically for symptoms referable to the left L5 nerve. No central canal stenosis at any lumbar level.  2. Remote L1 superior endplate compression fracture. No acute lumbar compression fracture.  3. " "Annular tear of the central portion of the intervertebral disc at L1-L2."    Assessment and plan:  The patient is a 85 y.o. female seen in follow up from the hospital for a stroke involving a portion of the territory of the right MCA.  We spoke at length about methods for mitigating risk of further strokes.  She is to continue her ASA and Plavix.  She will work with her PCP on topics such as BP control, lipid profile optimization, and glucose monitoring.  The patient has a history of carotid stenosis, so I think it is prudent to repeat a carotid U/S.  I would like for her to try some additional outpatient PT to see if she has some further symptomatic improvement.  Finally, she has not seen PM&R for recommendations related to specific therapies, DME, and Botox.  I do think this should happen and will make the referral.    Regarding the patient's essential tremor, she is taking Neurontin.  She is satisfied with her current degree of symptom control and indicates that she is tolerating it well.  We will continue it.    We will plan on seeing the patient back in a few months.    "

## 2018-10-05 NOTE — H&P (VIEW-ONLY)
CHIEF COMPLAINT/REASON FOR VISIT:  Back and left leg pain    HPI: The patient is an 85-year-old Female with a history of breast cancer, hypertension, hypothyroidism and complaints of bilateral posterior neck and shoulder pain, worse on the left.  She is status post left L5/S1 transforaminal epidural steroid injection on 07/30/2018 with 0% relief.  She complains of bilateral low back pain radiating to the left posterior thigh.  Her pain is constant, mildly improved with pain medication.  She continues to have left-sided weakness due to CVA, denies any changes to this.  She denies numbness, bladder or bowel incontinence.    Pain intervention history: She is only taking hydrocodone-acetaminophen 10/500 with mild relief. She tried Flexeril at nighttime without much relief. She undergone physical therapy but felt like it made her worse. She is now status post cervical epidural steroid injection on 3/2/11 and 5/18/11 now with greater than 95% relief on going. She is status post cervical MYLES on 7/30/13 and again on 8/27/13 with 75% relief.  She is status post C7-T1 cervical interlaminar epidural steroid injection on 11/18/13 with greater than 50% relief of her neck pain.  She is status post C7-T1 cervical interlaminar epidural steroid injection on 1/20/14 with 90% relief in her neck and 100% relief in her arms. She is status post C7-T1 cervical interlaminar epidural steroid injection on 8/15/14 with 50% relief of her pain.  She is status post C7-T1 cervical interlaminar epidural steroid injection on 9/2/15 with 100% relief.  She is status post L5/S1 interlaminar epidural steroid injection to the right on 10/7/15 with 100% relief of her low back and right leg pain.  She is status post C7-T1 cervical interlaminar epidural steroid injection on 12/22/15 with less than 50% relief.  She is status post C7-T1 cervical interlaminar epidural steroid injection on 7/8/16 with moderate relief.  She is status post caudal epidural  steroid injection on 10/28/16 with about 50% relief.  She is status post caudal epidural steroid injection on 18 with 0% relief.  She is status post left L5/S1 transforaminal epidural steroid injection on 2018 with 0% relief.     Review of systems: Patient reports neck pain and left shoulder pain.  Balance of review of systems is negative.    Medical, surgical, family and social history reviewed elsewhere in record.     Medications/Allergies: See med card    Vitals:    10/04/18 1425   BP: 130/74   Pulse: 80   Temp: 98.2 °F (36.8 °C)   TempSrc: Oral   PainSc:   8   PainLoc: Back       PHYSICAL EXAM:  Gen: A and O x3, pleasant, well-groomed  Skin: No rashes or obvious lesions  HEENT: PERRLA   CVS: Regular rate and rhythm, normal S1 and S2, no murmurs.  Resp: Clear to auscultation bilaterally, no wheezes or rales.  Abdomen: Soft, NT/ND, normal bowel sounds present.  Musculoskeletal:  Presents in wheelchair today, unable to move left arm, able to slightly move left leg     Neuro:  Lower extremities: 5/5 strength right side, 2/5 left side   Reflexes:  Patellar 0+, Achilles 0+.  Sensory: Intact and symmetrical to light touch and pinprick in C2-T1 dermatomes on the right, mild decrease sensation on the left. Intact and symmetrical to light touch and pinprick in L2-S1 dermatomes on the right, mild decrease sensation on the left.    Lumbar spine:  Lumbar spine: Unable to stand without assistance due to past CVA and left-sided weakness.  Catracho's test is deferred.  Straight leg raise causes left buttock and left leg pain.   Internal and external rotation of the hip causes no increased pain on either side.  Myofascial exam: Mild tenderness to palpation across the lumbar paraspinous muscles.      IMAGIN12 MRI C-spine  C2/3 there is no evidence of disk protrusion, canal or foraminal stenosis.  C3/4: There is annular disk bulge and small posterior canal ligamentous hypertrophy which combine to cause moderate  canal distortion. The cord does not appear contacted. This is not significant changed relative to the prior exam.  C4/5: There is a small central disk protrusion this combines with some mild posterior canal ligamentous hypertrophy to produce moderate canal stenosis. The cord does not appear contacted in the AP canal measures approximately 11 mm the foramina are intact.  C5/6: Moderate left and mild to moderate right-sided uncal for T. both induced neural foraminal narrowing is noted. There slight disk bulging with Vaughn mild AP canal distortion.  C6/7: There is a mild posterior canal at mid is hypertrophy and mild left greater than right uncovertebral induced neural foraminal narrowing. The central canal is only slightly distorted.  C7/T1: There is slight disk bulging without evidence of significant canal or foraminal stenosis.    1/13/14 MRI cervical spine  At the C2-C3 level, minimal disk bulge of one to 2 mm may be noted. No significant central canal stenosis, or foraminal narrowing, or detrimental change is noted  At the C3-C4 level, mild broad-based bulging is noted of approximately 2 mm, minimal central canal narrowing and neuroforaminal narrowing is noted. No convincing detrimental change is noted since the prior.  At the C4-C5 level, minimal broad-based bulging is noted centrally at one to 2 mm no convincing detrimental change since the prior exam. Mild central canal narrowing is noted. Mild right greater than left nerve foraminal narrowing is noted. No convincing the detrimental change is noted since the prior. Facet arthropathy is noted bilaterally.  At the C5-C6 level uncovertebral spurring is noted bilaterally that may relate to disk osteophyte and protrusion towards each nerve foramen of two to 3 mm. Facet arthropathy is noted bilaterally. Mild bilateral neuroforamina narrowing is noted. Minimal central canal narrowing is noted. No convincing detrimental change is noted.  At the C6-C7 level,  uncovertebral spurring appears to be present bilaterally. Broad-based disk osteophyte bulging may be noted to each neuroforamen of one to 2 mm. Mild bilateral nerve foraminal narrowing is noted. No significant central canal narrowing is noted. A similar appearance is noted on the prior  At the C7-T1 level, minimal disk bulge may be noted and most, no significant central canal stenosis or nerve foraminal stenosis is noted. No convincing detrimental changes noted.    6/15/15 MRI lumbar spine  T12-L1: No central canal or neuroforaminal stenosis. No disc protrusion or extrusion. There is ligamentum flavum thickening and facet arthropathy.  L1-L2: There is minor facet arthropathy. There is an annular tear of the central portion of the intervertebral disc. No central canal or neuroforaminal stenosis. No disc protrusion or extrusion.  L2-L3: There is ligamentum flavum thickening and facet arthropathy. No central canal or neuroforaminal stenosis. No disc protrusion or extrusion.  L3-L4: There is a broad-based bulge which extends into both neuroforamina. There is ligamentum flavum thickening and facet arthropathy. No central canal or neuroforaminal stenosis. No disc protrusion or extrusion.  L4-L5: There is bilateral facet arthropathy. No central canal or neuroforaminal stenosis. No disc protrusion or extrusion.  L5-S1: There is a grade II anterolisthesis of L5 on S1. There is severe bilateral facet arthropathy. There is ligamentum flavum thickening. There is at least moderate left neuroforaminal stenosis. No right neuroforaminal stenosis. No central canal stenosis.       ASSESSMENT:  The patient is an 85-year-old Female with a history of breast cancer, hypertension, hypothyroidism and complaints of bilateral posterior neck and shoulder pain, worse on the left.      1. Lumbar spondylosis  Vital signs    Place 18-22 [facility]     Verify informed consent    Notify physician     Notify physician     Notify physician  (specify)    Diet NPO    Case Request Operating Room: Block-nerve-medial branch-lumbar    Place in Outpatient    lactated ringers infusion   2. DDD (degenerative disc disease), lumbar     3. Lumbar radiculopathy         Plan:  1.  Since she has not had relief with a caudal MYLES or transforaminal, we discussed that her pain may be facet mediated and I will schedule her for diagnostic bilateral L3, 4 and 5 medial branch blocks.  If successful, we will proceed with radiofrequency ablation.  2.  She continues to take hydrocodone and her son will call for a prescription when necessary.  3.  Follow-up in 4 weeks postprocedure sooner as needed.    Greater than 50% of this 25 minute visit was spent on counseling patient.

## 2018-10-05 NOTE — PROGRESS NOTES
CHIEF COMPLAINT/REASON FOR VISIT:  Back and left leg pain    HPI: The patient is an 85-year-old Female with a history of breast cancer, hypertension, hypothyroidism and complaints of bilateral posterior neck and shoulder pain, worse on the left.  She is status post left L5/S1 transforaminal epidural steroid injection on 07/30/2018 with 0% relief.  She complains of bilateral low back pain radiating to the left posterior thigh.  Her pain is constant, mildly improved with pain medication.  She continues to have left-sided weakness due to CVA, denies any changes to this.  She denies numbness, bladder or bowel incontinence.    Pain intervention history: She is only taking hydrocodone-acetaminophen 10/500 with mild relief. She tried Flexeril at nighttime without much relief. She undergone physical therapy but felt like it made her worse. She is now status post cervical epidural steroid injection on 3/2/11 and 5/18/11 now with greater than 95% relief on going. She is status post cervical MYLES on 7/30/13 and again on 8/27/13 with 75% relief.  She is status post C7-T1 cervical interlaminar epidural steroid injection on 11/18/13 with greater than 50% relief of her neck pain.  She is status post C7-T1 cervical interlaminar epidural steroid injection on 1/20/14 with 90% relief in her neck and 100% relief in her arms. She is status post C7-T1 cervical interlaminar epidural steroid injection on 8/15/14 with 50% relief of her pain.  She is status post C7-T1 cervical interlaminar epidural steroid injection on 9/2/15 with 100% relief.  She is status post L5/S1 interlaminar epidural steroid injection to the right on 10/7/15 with 100% relief of her low back and right leg pain.  She is status post C7-T1 cervical interlaminar epidural steroid injection on 12/22/15 with less than 50% relief.  She is status post C7-T1 cervical interlaminar epidural steroid injection on 7/8/16 with moderate relief.  She is status post caudal epidural  steroid injection on 10/28/16 with about 50% relief.  She is status post caudal epidural steroid injection on 18 with 0% relief.  She is status post left L5/S1 transforaminal epidural steroid injection on 2018 with 0% relief.     Review of systems: Patient reports neck pain and left shoulder pain.  Balance of review of systems is negative.    Medical, surgical, family and social history reviewed elsewhere in record.     Medications/Allergies: See med card    Vitals:    10/04/18 1425   BP: 130/74   Pulse: 80   Temp: 98.2 °F (36.8 °C)   TempSrc: Oral   PainSc:   8   PainLoc: Back       PHYSICAL EXAM:  Gen: A and O x3, pleasant, well-groomed  Skin: No rashes or obvious lesions  HEENT: PERRLA   CVS: Regular rate and rhythm, normal S1 and S2, no murmurs.  Resp: Clear to auscultation bilaterally, no wheezes or rales.  Abdomen: Soft, NT/ND, normal bowel sounds present.  Musculoskeletal:  Presents in wheelchair today, unable to move left arm, able to slightly move left leg     Neuro:  Lower extremities: 5/5 strength right side, 2/5 left side   Reflexes:  Patellar 0+, Achilles 0+.  Sensory: Intact and symmetrical to light touch and pinprick in C2-T1 dermatomes on the right, mild decrease sensation on the left. Intact and symmetrical to light touch and pinprick in L2-S1 dermatomes on the right, mild decrease sensation on the left.    Lumbar spine:  Lumbar spine: Unable to stand without assistance due to past CVA and left-sided weakness.  Catracho's test is deferred.  Straight leg raise causes left buttock and left leg pain.   Internal and external rotation of the hip causes no increased pain on either side.  Myofascial exam: Mild tenderness to palpation across the lumbar paraspinous muscles.      IMAGIN12 MRI C-spine  C2/3 there is no evidence of disk protrusion, canal or foraminal stenosis.  C3/4: There is annular disk bulge and small posterior canal ligamentous hypertrophy which combine to cause moderate  canal distortion. The cord does not appear contacted. This is not significant changed relative to the prior exam.  C4/5: There is a small central disk protrusion this combines with some mild posterior canal ligamentous hypertrophy to produce moderate canal stenosis. The cord does not appear contacted in the AP canal measures approximately 11 mm the foramina are intact.  C5/6: Moderate left and mild to moderate right-sided uncal for T. both induced neural foraminal narrowing is noted. There slight disk bulging with Vaughn mild AP canal distortion.  C6/7: There is a mild posterior canal at mid is hypertrophy and mild left greater than right uncovertebral induced neural foraminal narrowing. The central canal is only slightly distorted.  C7/T1: There is slight disk bulging without evidence of significant canal or foraminal stenosis.    1/13/14 MRI cervical spine  At the C2-C3 level, minimal disk bulge of one to 2 mm may be noted. No significant central canal stenosis, or foraminal narrowing, or detrimental change is noted  At the C3-C4 level, mild broad-based bulging is noted of approximately 2 mm, minimal central canal narrowing and neuroforaminal narrowing is noted. No convincing detrimental change is noted since the prior.  At the C4-C5 level, minimal broad-based bulging is noted centrally at one to 2 mm no convincing detrimental change since the prior exam. Mild central canal narrowing is noted. Mild right greater than left nerve foraminal narrowing is noted. No convincing the detrimental change is noted since the prior. Facet arthropathy is noted bilaterally.  At the C5-C6 level uncovertebral spurring is noted bilaterally that may relate to disk osteophyte and protrusion towards each nerve foramen of two to 3 mm. Facet arthropathy is noted bilaterally. Mild bilateral neuroforamina narrowing is noted. Minimal central canal narrowing is noted. No convincing detrimental change is noted.  At the C6-C7 level,  uncovertebral spurring appears to be present bilaterally. Broad-based disk osteophyte bulging may be noted to each neuroforamen of one to 2 mm. Mild bilateral nerve foraminal narrowing is noted. No significant central canal narrowing is noted. A similar appearance is noted on the prior  At the C7-T1 level, minimal disk bulge may be noted and most, no significant central canal stenosis or nerve foraminal stenosis is noted. No convincing detrimental changes noted.    6/15/15 MRI lumbar spine  T12-L1: No central canal or neuroforaminal stenosis. No disc protrusion or extrusion. There is ligamentum flavum thickening and facet arthropathy.  L1-L2: There is minor facet arthropathy. There is an annular tear of the central portion of the intervertebral disc. No central canal or neuroforaminal stenosis. No disc protrusion or extrusion.  L2-L3: There is ligamentum flavum thickening and facet arthropathy. No central canal or neuroforaminal stenosis. No disc protrusion or extrusion.  L3-L4: There is a broad-based bulge which extends into both neuroforamina. There is ligamentum flavum thickening and facet arthropathy. No central canal or neuroforaminal stenosis. No disc protrusion or extrusion.  L4-L5: There is bilateral facet arthropathy. No central canal or neuroforaminal stenosis. No disc protrusion or extrusion.  L5-S1: There is a grade II anterolisthesis of L5 on S1. There is severe bilateral facet arthropathy. There is ligamentum flavum thickening. There is at least moderate left neuroforaminal stenosis. No right neuroforaminal stenosis. No central canal stenosis.       ASSESSMENT:  The patient is an 85-year-old Female with a history of breast cancer, hypertension, hypothyroidism and complaints of bilateral posterior neck and shoulder pain, worse on the left.      1. Lumbar spondylosis  Vital signs    Place 18-22 [facility]     Verify informed consent    Notify physician     Notify physician     Notify physician  (specify)    Diet NPO    Case Request Operating Room: Block-nerve-medial branch-lumbar    Place in Outpatient    lactated ringers infusion   2. DDD (degenerative disc disease), lumbar     3. Lumbar radiculopathy         Plan:  1.  Since she has not had relief with a caudal MYLES or transforaminal, we discussed that her pain may be facet mediated and I will schedule her for diagnostic bilateral L3, 4 and 5 medial branch blocks.  If successful, we will proceed with radiofrequency ablation.  2.  She continues to take hydrocodone and her son will call for a prescription when necessary.  3.  Follow-up in 4 weeks postprocedure sooner as needed.    Greater than 50% of this 25 minute visit was spent on counseling patient.

## 2018-10-11 ENCOUNTER — OUTPATIENT CASE MANAGEMENT (OUTPATIENT)
Dept: ADMINISTRATIVE | Facility: OTHER | Age: 83
End: 2018-10-11

## 2018-10-11 ENCOUNTER — TELEPHONE (OUTPATIENT)
Dept: PHYSICAL MEDICINE AND REHAB | Facility: CLINIC | Age: 83
End: 2018-10-11

## 2018-10-11 RX ORDER — HYDROCODONE BITARTRATE AND ACETAMINOPHEN 10; 325 MG/1; MG/1
1 TABLET ORAL EVERY 6 HOURS PRN
Qty: 60 TABLET | Refills: 0 | Status: SHIPPED | OUTPATIENT
Start: 2018-10-11 | End: 2018-10-23 | Stop reason: SDUPTHER

## 2018-10-11 NOTE — PROGRESS NOTES
10/11/2018  Summary:  Follow up with patient this am.  Patient reports that she is still having pain in back and both legs.  Reviewed with patient recent appointment with pain management doctor, and plan of care.  Patient reports scheduled for surgery - plan for diagnostic bilateral L3, 4 and 5 medial branch blocks, followed by radiofrequency ablation.  Procedure is scheduled for later this month.  Patient confirms her son will provide transportation to and from procedure and assist with needs post procedure.  Encouraged patient to have someone with her.  Patient denies any recent falls, encouraged fall prevention.  Reviewed with patient fall prevention education, and home safety education.  Encouraged medication compliance.  Patient without questions.  Anticipate a follow up call post procedure, and then close of case.  Patient is agreeable to this plan.  Will schedule follow up post procedure.  No new needs identified.     Interventions: reviewed scheduled procedure date, confirmed transportation to procedure, encouraged medication compliance, reviewed home safety/fall prevention    Plan: follow up post procedure for any needs, close case.      Todays OPCM Self-Management Care Plan was developed with the patients/caregivers input and was based on identified barriers from todays assessment.  Goals were written today with the patient/caregiver and the patient has agreed to work towards these goals to improve his/her overall well-being. Patient verbalized understanding of the care plan, goals, and all of today's instructions. Encouraged patient/caregiver to communicate with his/her physician and health care team about health conditions and the treatment plan.  Provided my contact information today and encouraged patient/caregiver to call me with any questions as needed.  NILDA Sullivan

## 2018-10-11 NOTE — TELEPHONE ENCOUNTER
----- Message from RT Jose Luis sent at 10/11/2018  8:13 AM CDT -----  Contact: pt    pt , requesting medication refill:Hydrocodone and would like a call back to discuss her level of pain, thanks.

## 2018-10-12 ENCOUNTER — TELEPHONE (OUTPATIENT)
Dept: FAMILY MEDICINE | Facility: CLINIC | Age: 83
End: 2018-10-12

## 2018-10-12 NOTE — TELEPHONE ENCOUNTER
----- Message from Stacia Zhang sent at 10/12/2018  1:06 PM CDT -----  Contact: self  Patient would like to reschedule her appointment to the following week. Please call patient at [phone]. Thanks!

## 2018-10-12 NOTE — TELEPHONE ENCOUNTER
Patient was notified that Dr. Menjivar does not treat CVA.  The patient cannot go to [facility] to see Dr. Ghotra.  What is the next step.  Please advise.

## 2018-10-18 DIAGNOSIS — M51.36 DDD (DEGENERATIVE DISC DISEASE), LUMBAR: Primary | ICD-10-CM

## 2018-10-19 ENCOUNTER — HOSPITAL ENCOUNTER (OUTPATIENT)
Dept: RADIOLOGY | Facility: HOSPITAL | Age: 83
Discharge: HOME | End: 2018-10-19
Attending: ANESTHESIOLOGY
Payer: MEDICARE

## 2018-10-19 ENCOUNTER — HOSPITAL ENCOUNTER (OUTPATIENT)
Facility: HOSPITAL | Age: 83
Discharge: HOME | End: 2018-10-19
Attending: ANESTHESIOLOGY | Admitting: ANESTHESIOLOGY
Payer: MEDICARE

## 2018-10-19 VITALS
HEART RATE: 72 BPM | TEMPERATURE: 97 F | SYSTOLIC BLOOD PRESSURE: 182 MMHG | OXYGEN SATURATION: 100 % | DIASTOLIC BLOOD PRESSURE: 78 MMHG | RESPIRATION RATE: 15 BRPM

## 2018-10-19 DIAGNOSIS — M51.36 DDD (DEGENERATIVE DISC DISEASE), LUMBAR: ICD-10-CM

## 2018-10-19 DIAGNOSIS — M47.816 LUMBAR SPONDYLOSIS: Primary | ICD-10-CM

## 2018-10-19 PROCEDURE — 64495 INJ PARAVERT F JNT L/S 3 LEV: CPT | Mod: 50,PO | Performed by: ANESTHESIOLOGY

## 2018-10-19 PROCEDURE — 76000 FLUOROSCOPY <1 HR PHYS/QHP: CPT | Mod: TC,PO

## 2018-10-19 PROCEDURE — 63600175 PHARM REV CODE 636 W HCPCS: Mod: PO | Performed by: ANESTHESIOLOGY

## 2018-10-19 PROCEDURE — 64494 INJ PARAVERT F JNT L/S 2 LEV: CPT | Mod: 50,,, | Performed by: ANESTHESIOLOGY

## 2018-10-19 PROCEDURE — 64494 INJ PARAVERT F JNT L/S 2 LEV: CPT | Mod: 50,PO | Performed by: ANESTHESIOLOGY

## 2018-10-19 PROCEDURE — S0020 INJECTION, BUPIVICAINE HYDRO: HCPCS | Mod: PO | Performed by: ANESTHESIOLOGY

## 2018-10-19 PROCEDURE — 25000003 PHARM REV CODE 250: Mod: PO | Performed by: ANESTHESIOLOGY

## 2018-10-19 PROCEDURE — 25500020 PHARM REV CODE 255: Mod: PO | Performed by: ANESTHESIOLOGY

## 2018-10-19 PROCEDURE — 64493 INJ PARAVERT F JNT L/S 1 LEV: CPT | Mod: 50,PO | Performed by: ANESTHESIOLOGY

## 2018-10-19 PROCEDURE — 64493 INJ PARAVERT F JNT L/S 1 LEV: CPT | Mod: 50,,, | Performed by: ANESTHESIOLOGY

## 2018-10-19 PROCEDURE — 99152 MOD SED SAME PHYS/QHP 5/>YRS: CPT | Mod: ,,, | Performed by: ANESTHESIOLOGY

## 2018-10-19 RX ORDER — LIDOCAINE HYDROCHLORIDE 10 MG/ML
INJECTION, SOLUTION EPIDURAL; INFILTRATION; INTRACAUDAL; PERINEURAL
Status: DISCONTINUED | OUTPATIENT
Start: 2018-10-19 | End: 2018-10-19 | Stop reason: HOSPADM

## 2018-10-19 RX ORDER — SODIUM CHLORIDE, SODIUM LACTATE, POTASSIUM CHLORIDE, CALCIUM CHLORIDE 600; 310; 30; 20 MG/100ML; MG/100ML; MG/100ML; MG/100ML
INJECTION, SOLUTION INTRAVENOUS CONTINUOUS
Status: DISCONTINUED | OUTPATIENT
Start: 2018-10-19 | End: 2018-10-19 | Stop reason: HOSPADM

## 2018-10-19 RX ORDER — MIDAZOLAM HYDROCHLORIDE 1 MG/ML
INJECTION INTRAMUSCULAR; INTRAVENOUS
Status: DISCONTINUED | OUTPATIENT
Start: 2018-10-19 | End: 2018-10-19 | Stop reason: HOSPADM

## 2018-10-19 RX ORDER — BUPIVACAINE HYDROCHLORIDE 5 MG/ML
INJECTION, SOLUTION EPIDURAL; INTRACAUDAL
Status: DISCONTINUED | OUTPATIENT
Start: 2018-10-19 | End: 2018-10-19 | Stop reason: HOSPADM

## 2018-10-19 RX ADMIN — SODIUM CHLORIDE, SODIUM LACTATE, POTASSIUM CHLORIDE, AND CALCIUM CHLORIDE: .6; .31; .03; .02 INJECTION, SOLUTION INTRAVENOUS at 12:10

## 2018-10-19 NOTE — PLAN OF CARE
all questions answered. Denies recent fever or illness. Pt states ready for procedure. Dr. Carrion notified of elevated blood pressure readings, no order given.

## 2018-10-19 NOTE — DISCHARGE SUMMARY
[facility]  Discharge Note  Short Stay    Admit Date: 10/19/2018    Discharge Date: 10/19/2018    Attending Physician: Darinel Carrion MD     Discharge Provider: Darinel Carrion    Diagnoses:  Active Hospital Problems    Diagnosis  POA    *Lumbar spondylosis [M47.816]  Yes      Resolved Hospital Problems   No resolved problems to display.       Discharged Condition: good    Final Diagnoses: Lumbar spondylosis [M47.816]    Disposition: Home or Self Care    Hospital Course: no complications, uneventful    Outcome of Hospitalization, Treatment, Procedure, or Surgery:  Patient was admitted for outpatient procedure. The patient underwent procedure without complications and are discharged home    Follow up/Patient Instructions:  Follow up as scheduled in Pain Management clinic in 3-4 weeks/Patient has received instructions and follow up date and time    Medications:  Continue previous medications    Discharge Procedure Orders   Call MD for:  temperature >100.4     Call MD for:  severe uncontrolled pain     Call MD for:  redness, tenderness, or signs of infection (pain, swelling, redness, odor or green/yellow discharge around incision site)     Call MD for:  severe persistent headache     No dressing needed         Discharge Procedure Orders (must include Diet, Follow-up, Activity):   Discharge Procedure Orders (must include Diet, Follow-up, Activity)   Call MD for:  temperature >100.4     Call MD for:  severe uncontrolled pain     Call MD for:  redness, tenderness, or signs of infection (pain, swelling, redness, odor or green/yellow discharge around incision site)     Call MD for:  severe persistent headache     No dressing needed

## 2018-10-19 NOTE — DISCHARGE INSTRUCTIONS
Recovery After Procedural Sedation (Adult)  You have been given medicine by vein to make you sleep during your surgery. This may have included both a pain medicine and sleeping medicine. Most of the effects have worn off. But you may still have some drowsiness for the next 6 to 8 hours.  Home care  Follow these guidelines when you get home:  · For the next 8 hours, you should be watched by a responsible adult. This person should make sure your condition is not getting worse.  · Don't drink any alcohol for the next 24 hours.  · Don't drive, operate dangerous machinery, or make important business or personal decisions during the next 24 hours.  Note: Your healthcare provider may tell you not to take any medicine by mouth for pain or sleep in the next 4 hours. These medicines may react with the medicines you were given in the hospital. This could cause a much stronger response than usual.  Follow-up care  Follow up with your healthcare provider if you are not alert and back to your usual level of activity within 12 hours.  When to seek medical advice  Call your healthcare provider right away if any of these occur:  · Drowsiness gets worse  · Weakness or dizziness gets worse  · Repeated vomiting  · You can't be awakened   Date Last Reviewed: 10/18/2016  © 7919-4886 [employer] [address] [place] [address], [place] [address]. All rights reserved. This information is not intended as a substitute for professional medical care. Always follow your healthcare professional's instructions.          Home care instructions  Apply ice pack to the injection site for 20 minutes periods for the first 24 hrs for soreness/discomfort at injection site DO NOT USE HEAT FOR 24 HOURS  Keep site clean and dry for 24 hours  Take care when USING YOUR WHEELCHAIR OR ANY OTHER ACTIVITY AFTER HAVING YOUR LUMBAR LOWER BACK NERVE ROOT BLOCK  SIT UP FOR A FEW HOURS AFTER YOU REACH HOME    Resume home medication as prescribed  today  Resume Aspirin, Plavix, or Coumadin the day after the procedure unless otherwise instructed.    SEE IMMEDIATE MEDICAL HELP FOR:  Severe increase in your usual pain or appearance of new pain  Prolonged or increasing weakness or numbness in the legs or arms  Drainage, redness, active bleeding, or increased swelling at the injection site  Temperature over 100.0 degrees F.  Headache that increases when your head is upright and decreases when you lie flat    CALL 911 OR GO DIRECTLY TO EMERGENCY DEPARTMENT FOR:  Shortness of breath, chest pain, or problems breathing

## 2018-10-19 NOTE — OP NOTE
PROCEDURE DATE: 10/19/2018    PROCEDURE:  Bilateral L3,4,5 medial branch nerve block     DIAGNOSIS:  Lumbar spondylosis    Post Op diagnosis: Same    PHYSICIAN: Darinel Carrion MD    MEDICATIONS INJECTED: 0.25% bupivicaine, 1ml at each level    LOCAL ANESTHETIC USED: Lidocaine 1%, 2ml at each level    SEDATION MEDICATIONS:2mg versed    ESTIMATED BLOOD LOSS:  none    COMPLICATIONS:  none    TECHNIQUE: A time out was taken to identify the patient, procedure and side of the procedure. The patient was placed in a prone position, then prepped and draped in the usual sterile fashion using ChloraPrep and sterile towels.  The levels were determined under fluoroscopic guidance and then marked.  Local anesthetic was given by raising a wheal at the skin over each site and then infiltrated approximately 2cm deeper.  A 25-gauge 3.5 inch needle was introduced to the anatomic location of the bilateral L3,4,5 medial branch nerves on the bilateral side.  Appropriate location and medication spread confirmed by injecting 0.5ml of Omnipaque. The above medication was then injected. The patient tolerated the procedure well.     The patient was monitored after the procedure. The patient will be contacted in the next few days to determine extent of relief.  Patient was given post procedure and discharge instructions to follow at home.  The patient was discharged in a stable condition.

## 2018-10-20 ENCOUNTER — NURSE TRIAGE (OUTPATIENT)
Dept: ADMINISTRATIVE | Facility: CLINIC | Age: 83
End: 2018-10-20

## 2018-10-20 NOTE — TELEPHONE ENCOUNTER
"pain from a shot that was suppose to block pain. Pt took tylenol at 0730 this am. Took arthritis tylenol also. Pt is in a wheelchair - Hx CVA.  Sister states that pt is in pain in chair, laying in bed hurts, getting on potty chair. Overall pain =10.     Reason for Disposition   [1] SEVERE back pain (e.g., excruciating) AND [2] sudden onset AND [3] age > 60    Answer Assessment - Initial Assessment Questions  1. ONSET: "When did the pain begin?"      Lower back, no blood in urine   2. LOCATION: "Where does it hurt?" (upper, mid or lower back)     Lower  3. SEVERITY: "How bad is the pain?"  (e.g., Scale 1-10; mild, moderate, or severe)    - MILD (1-3): doesn't interfere with normal activities     - MODERATE (4-7): interferes with normal activities or awakens from sleep     - SEVERE (8-10): excruciating pain, unable to do any normal activities      10 minimally   4. PATTERN: "Is the pain constant?" (e.g., yes, no; constant, intermittent)      Pain is worse on potty   Constant since last pm   5. RADIATION: "Does the pain shoot into your legs or elsewhere?"   no   6. CAUSE:  "What do you think is causing the back pain?"    flare up from numbing medicine   7. BACK OVERUSE:  "Any recent lifting of heavy objects, strenuous work or exercise?"    No , had CVA 2 weeks ago. No falls , able to fully empty bladder   8. MEDICATIONS: "What have you taken so far for the pain?" (e.g., nothing, acetaminophen, NSAIDS)     Tylenol   9. NEUROLOGIC SYMPTOMS: "Do you have any weakness, numbness, or problems with bowel/bladder control?"    No   10. OTHER SYMPTOMS: "Do you have any other symptoms?" (e.g., fever, abdominal pain, burning with urination, blood in urine)     afeb    Protocols used: ST BACK PAIN-A-  rec ED due to worsening pain - states that son will take her. Call back with questions.     "

## 2018-10-22 ENCOUNTER — TELEPHONE (OUTPATIENT)
Dept: SURGERY | Facility: HOSPITAL | Age: 83
End: 2018-10-22

## 2018-10-22 DIAGNOSIS — H40.1192 PRIMARY OPEN-ANGLE GLAUCOMA, MODERATE STAGE, UNSPECIFIED LATERALITY: Primary | ICD-10-CM

## 2018-10-22 RX ORDER — BRIMONIDINE TARTRATE 2 MG/ML
1 SOLUTION/ DROPS OPHTHALMIC 2 TIMES DAILY
Qty: 10 ML | Refills: 1 | Status: SHIPPED | OUTPATIENT
Start: 2018-10-22

## 2018-10-22 RX ORDER — DORZOLAMIDE HCL 20 MG/ML
1 SOLUTION/ DROPS OPHTHALMIC 2 TIMES DAILY
Qty: 10 ML | Refills: 1 | Status: SHIPPED | OUTPATIENT
Start: 2018-10-22

## 2018-10-22 RX ORDER — LATANOPROST 50 UG/ML
1 SOLUTION/ DROPS OPHTHALMIC NIGHTLY
Qty: 7.5 ML | Refills: 1 | Status: SHIPPED | OUTPATIENT
Start: 2018-10-22 | End: 2019-02-14

## 2018-10-22 NOTE — TELEPHONE ENCOUNTER
Attempted to call patient regarding block on Friday and triage message. No call at either number. Spoke with her son and he will have her call us back.

## 2018-10-22 NOTE — TELEPHONE ENCOUNTER
Called pt to reschedule her appt with Dr Murray. Pt not feeling well post procedure being done and wants to call back to reschedule. Pt needs refills on her eye drops also. Told her I would send request to Dr Mandel but he would probable only give 1 refill on each drop. Pt understands.

## 2018-10-22 NOTE — TELEPHONE ENCOUNTER
Patient called the [facility] center on Saturday, October 20 and left a message that she was in a lot of pain from her injections on Friday. She requested that someone from Dr. Carrion's office call her to discuss.

## 2018-10-23 ENCOUNTER — TELEPHONE (OUTPATIENT)
Dept: PAIN MEDICINE | Facility: CLINIC | Age: 83
End: 2018-10-23

## 2018-10-23 NOTE — TELEPHONE ENCOUNTER
----- Message from Elvis Wilder sent at 10/23/2018  3:15 PM CDT -----  Contact: patient  Type:  Patient Returning Call    Who Called:  patient  Who Left Message for Patient:  Rhonda  Does the patient know what this is regarding?:  Not sure  Best Call Back Number:  [phone]  Additional Information:  Requesting a call back

## 2018-10-23 NOTE — TELEPHONE ENCOUNTER
How many is she taking? If she is out, this would average almost 6 per day?Just want to make sure no one is taking her medication

## 2018-10-23 NOTE — TELEPHONE ENCOUNTER
Patient reports almost 100% pain relief on Friday after her block, she states that she sat up in her wheelchair all evening and she did not feel her normal pain. She was able to sleep well Friday after the block. Her normal pain returned on Saturday morning. Patient is ready to have the ablation and she will call us back when she checks with her son about available dates for her to have this.

## 2018-10-23 NOTE — TELEPHONE ENCOUNTER
----- Message from Margaret Herrera sent at 10/23/2018 10:23 AM CDT -----  Contact: self                                      attn:  Rhonda  Patient [phone] is returning call to Nurse Rhonda from yesterday 10 22 18 concerning her procedure this Friday 10 26 18/please call

## 2018-10-24 DIAGNOSIS — M47.816 LUMBAR SPONDYLOSIS: Primary | ICD-10-CM

## 2018-10-24 RX ORDER — SODIUM CHLORIDE, SODIUM LACTATE, POTASSIUM CHLORIDE, CALCIUM CHLORIDE 600; 310; 30; 20 MG/100ML; MG/100ML; MG/100ML; MG/100ML
INJECTION, SOLUTION INTRAVENOUS CONTINUOUS
Status: CANCELLED | OUTPATIENT
Start: 2018-11-09

## 2018-10-24 NOTE — TELEPHONE ENCOUNTER
Spoke to patient again to confirm date for her RFA and she states that she is not out of her medication. She has enough to last her through this weekend. She also states that sometimes she takes the medication 4 times a day when she is having more pain. She could not tell me how many she has left as she would need someone to get the medication for her and her sitter was not available.

## 2018-10-24 NOTE — TELEPHONE ENCOUNTER
Patient states she is currently out and she said that she is taking the medication 3 times a day. I also confirmed with the pharmacy that she picked this prescription up on 10/11/18.

## 2018-10-24 NOTE — TELEPHONE ENCOUNTER
----- Message from Cassia Jacinto sent at 10/24/2018  8:13 AM CDT -----  Asking to discuss the next procedure / call [phone] (home)

## 2018-10-26 RX ORDER — RABEPRAZOLE SODIUM 20 MG/1
TABLET, DELAYED RELEASE ORAL
Qty: 30 TABLET | Refills: 0 | Status: SHIPPED | OUTPATIENT
Start: 2018-10-26 | End: 2018-11-27 | Stop reason: SDUPTHER

## 2018-10-26 RX ORDER — HYDROCODONE BITARTRATE AND ACETAMINOPHEN 10; 325 MG/1; MG/1
1 TABLET ORAL EVERY 6 HOURS PRN
Qty: 40 TABLET | Refills: 0 | Status: SHIPPED | OUTPATIENT
Start: 2018-10-26 | End: 2018-11-05 | Stop reason: SDUPTHER

## 2018-10-26 NOTE — TELEPHONE ENCOUNTER
Spoke with patient. She stated she took her last pill this morning. She states she is having a lot of pain and does not want to be out over the weekend. Please advise. Thanks.

## 2018-10-30 ENCOUNTER — OUTPATIENT CASE MANAGEMENT (OUTPATIENT)
Dept: ADMINISTRATIVE | Facility: OTHER | Age: 83
End: 2018-10-30

## 2018-10-30 NOTE — PROGRESS NOTES
10/30/18  Summary:  RN-CM contacted patient to follow up for OPCM. Spoke with Ms. Peres via telephone. She reports that she received 100% relief from her back pain on the day that she received the Bilateral L3,4,5 medial branch nerve block, 10/19/18. She reports that the next day, the pain was back and she called the doctor. Patient states that because she got relief from the block, the doctor told her that she could go forward with the ablation and scheduled it for 11/9/18. Patient was very happy with the results of the nerve block and is optimistic that the ablation will giver her more lasting relief from her pain. Patient states that she has tried the OTC medication, ice and heat in the past, which helps very little. She was able to correctly articulate the 3 non-invasive pain relief measures. Patient was also able to correctly articulate 2 pain relief measures (Nerve Block, Ablation). Patient is requesting a call back from RN-CM after the scheduled ablation on 11/9/18. As this procedure is scheduled on a Friday, we discussed having the follow up call in 2 weeks. Patient is in agreement. Patient states that her son, Andrés, will be accompanying her for her procedure and will be there to assist with her care if needed after the procedure. Patient expressed concern regarding whether she would still be able to take any pain medication after the ablation if she did not get 100% pain relief from the ablation. I explained to patient that she would need to discuss her pain level with her pain management doctor and together they would determine if continued pain medication was needed after the ablation; verbalized understanding. Patient denies any recent falls, encouraged continued fall prevention. Reviewed fall prevention and home safety education with patient. Encouraged continued edication compliance. Reviewed stoke symptoms with patient utilizing the FAST pneumonic and patient was able to correctly articulate 2 S/S  (one side weak, speech problems). Patient denies any recent S/S of stroke. Will schedule patient for follow up call post ablation. If no additional needs at next encounter, will plan to close case at that time. - CORBY Gomes, RN-OPCM    Interventions:  - Assessed for retention of 2 interventions to decrease pain (Nerve Block, Ablation).  - Reviewed alternative methods of pain relief with patient.   - Assessed for retention and efficacy of 3 noninvasive pain relief measures to help manage the pain: ice, medication, heat.  - Assessed for retention of the signs and symptoms of Stroke and the importance of calling 911 immediately when symptoms are present (FAST).  - Reviewed upcoming scheduled appointments.  - Encouraged compliance with Physician follow-ups.  - Encouraged continued Medication Compliance.     Plan:  - Follow up with Dr. Carrion's office regarding patient's request for pain medication refill. - 9/26/10 RX called in as requested to [facility], patient updated by MD's office.  - Follow up with Pain Management regarding rescheduling [facility] follow up appointment. - 8/16/18 Appt scheduled with KACI Nogueira for 8/29/18 1:30PM, Rescheduled for 10/4/18  - Follow up with PCP regarding scheduling an appointment, order for walker and medication discrepancy questions. - 8/16/18 PCP sent in RX for lisinopril, scheduling appt and will eval for walker at that time per MD response  - Complete OTC portion of Medication Reconciliation with son. - 9/10/18, 9/26/18 Son is not available to review OTC medications at this time.   - Follow up on receipt of resource materials for Stroke, MYLES, Pain Management and Advance Directive/POA re-mailed to patient on 8/16/18. - 9/10/18 Information received  - Assess for outcome of Left L5/S1 transforaminal epidural steroid injection on 7/30/18 with Dr. Darinel Carrion. - 8/15/18 Still having pain in lower back, radiating to left hip. Missed F/U appointment with PA on 8/9/18  - Needs to re-schedule. - 8/16/18 Appt scheduled with KACI Nogueira for 8/29/18 1:30PM - 9/10/18 Patient canceled appt as she was sick. - Appt rescheduled on 10/4/18  - Educate patient on signs and symptoms of Stroke and importance of seeking immediate medical attention if symptoms are present. - Done 8/15/18, 9/10/18, 9/26/18, 10/30/18   - Assess for retention of FAST pneumonic (stroke symptoms). - 9/10/18 Unable to remember. - 9/26/18 Reviewed, no retention; 10/30/18 (one side weak, speech problems)  - Assess for retention of pain management techniques, non-invasive and alternative methods. - 9/26/18 Reviewed, no retention; 10/30/18 (medication, ice and heat)  - Assess for any additional needs.     Todays OPCM Self-Management Care Plan was developed with the patients/caregivers input and was based on identified barriers from todays assessment.  Goals were reviewed today with the patient and the patient has agreed to work towards these goals to improve her overall well-being. Patient verbalized understanding of the care plan, goals, and all of today's instructions. Encouraged patient to communicate with her physician and health care team about health conditions and the treatment plan.  Provided my contact information today and encouraged patient/caregiver to call me with any questions as needed.

## 2018-11-02 ENCOUNTER — TELEPHONE (OUTPATIENT)
Dept: FAMILY MEDICINE | Facility: CLINIC | Age: 83
End: 2018-11-02

## 2018-11-02 DIAGNOSIS — I63.511 CEREBRAL INFARCTION INVOLVING RIGHT MIDDLE CEREBRAL ARTERY: ICD-10-CM

## 2018-11-02 RX ORDER — CLOPIDOGREL BISULFATE 75 MG/1
TABLET ORAL
Qty: 30 TABLET | Refills: 11 | Status: ON HOLD | OUTPATIENT
Start: 2018-11-02 | End: 2019-01-13 | Stop reason: HOSPADM

## 2018-11-02 NOTE — TELEPHONE ENCOUNTER
Spoke to patient. Advised she needs to scheduled an appt with Dr. Sheppard. Patient verbalized understanding. She states she will callback to schedule after she find out when her son will be able to bring her.

## 2018-11-05 RX ORDER — HYDROCODONE BITARTRATE AND ACETAMINOPHEN 10; 325 MG/1; MG/1
1 TABLET ORAL EVERY 6 HOURS PRN
Qty: 40 TABLET | Refills: 0 | Status: SHIPPED | OUTPATIENT
Start: 2018-11-05 | End: 2018-11-15 | Stop reason: SDUPTHER

## 2018-11-05 NOTE — TELEPHONE ENCOUNTER
----- Message from Savannah Bowen sent at 11/5/2018  9:57 AM CST -----  Patient needs to speak to someone regarding her procedure Friday.  Please call back to [phone].    Thank you

## 2018-11-05 NOTE — TELEPHONE ENCOUNTER
Already addressed see other message. Patient given an estimated time of arrival for her procedure.

## 2018-11-05 NOTE — TELEPHONE ENCOUNTER
----- Message from Ray Bruner sent at 11/5/2018  8:24 AM CST -----  Contact: Patient  Type: Needs Medical Advice    Who Called:  Patient  Best Call Back Number: [phone]   Additional Information: Patient would like a call back. No information given other than in regards to the 11/9 appointment. Thanks!

## 2018-11-07 ENCOUNTER — TELEPHONE (OUTPATIENT)
Dept: PAIN MEDICINE | Facility: CLINIC | Age: 83
End: 2018-11-07

## 2018-11-07 NOTE — TELEPHONE ENCOUNTER
----- Message from Dory Gagnon sent at 11/7/2018  9:18 AM CST -----  Contact: Patient  Patient is calling to try to find out what time she needs to arrive for her procedure on Friday so that she can let her son know.  Please call to discuss.  Call Back#[phone]  Thanks

## 2018-11-08 ENCOUNTER — TELEPHONE (OUTPATIENT)
Dept: PAIN MEDICINE | Facility: CLINIC | Age: 83
End: 2018-11-08

## 2018-11-08 NOTE — TELEPHONE ENCOUNTER
----- Message from Cassia Jacinto sent at 11/7/2018  3:42 PM CST -----  Please call pt at [phone]  Asking to discuss her procedure for Friday ... Her son is bringing her and needs more information

## 2018-11-09 ENCOUNTER — HOSPITAL ENCOUNTER (OUTPATIENT)
Facility: HOSPITAL | Age: 83
Discharge: HOME | End: 2018-11-09
Attending: ANESTHESIOLOGY | Admitting: ANESTHESIOLOGY
Payer: MEDICARE

## 2018-11-09 ENCOUNTER — HOSPITAL ENCOUNTER (OUTPATIENT)
Dept: RADIOLOGY | Facility: HOSPITAL | Age: 83
Discharge: HOME | End: 2018-11-09
Attending: ANESTHESIOLOGY
Payer: MEDICARE

## 2018-11-09 VITALS
HEIGHT: 63 IN | RESPIRATION RATE: 16 BRPM | TEMPERATURE: 98 F | SYSTOLIC BLOOD PRESSURE: 170 MMHG | BODY MASS INDEX: 21.26 KG/M2 | DIASTOLIC BLOOD PRESSURE: 76 MMHG | HEART RATE: 70 BPM | OXYGEN SATURATION: 99 % | WEIGHT: 120 LBS

## 2018-11-09 DIAGNOSIS — M51.36 DDD (DEGENERATIVE DISC DISEASE), LUMBAR: ICD-10-CM

## 2018-11-09 DIAGNOSIS — M47.816 LUMBAR SPONDYLOSIS: Primary | ICD-10-CM

## 2018-11-09 PROCEDURE — 99152 MOD SED SAME PHYS/QHP 5/>YRS: CPT | Mod: ,,, | Performed by: ANESTHESIOLOGY

## 2018-11-09 PROCEDURE — 64635 DESTROY LUMB/SAC FACET JNT: CPT | Mod: 50,,, | Performed by: ANESTHESIOLOGY

## 2018-11-09 PROCEDURE — 25000003 PHARM REV CODE 250: Mod: PO | Performed by: ANESTHESIOLOGY

## 2018-11-09 PROCEDURE — A4216 STERILE WATER/SALINE, 10 ML: HCPCS | Mod: PO | Performed by: ANESTHESIOLOGY

## 2018-11-09 PROCEDURE — 64635 DESTROY LUMB/SAC FACET JNT: CPT | Mod: 50,PO | Performed by: ANESTHESIOLOGY

## 2018-11-09 PROCEDURE — 64636 DESTROY L/S FACET JNT ADDL: CPT | Mod: 50,PO | Performed by: ANESTHESIOLOGY

## 2018-11-09 PROCEDURE — 76000 FLUOROSCOPY <1 HR PHYS/QHP: CPT | Mod: TC,PO

## 2018-11-09 PROCEDURE — 63600175 PHARM REV CODE 636 W HCPCS: Mod: PO | Performed by: ANESTHESIOLOGY

## 2018-11-09 PROCEDURE — 64636 DESTROY L/S FACET JNT ADDL: CPT | Mod: 50,,, | Performed by: ANESTHESIOLOGY

## 2018-11-09 RX ORDER — FENTANYL CITRATE 50 UG/ML
INJECTION, SOLUTION INTRAMUSCULAR; INTRAVENOUS
Status: DISCONTINUED | OUTPATIENT
Start: 2018-11-09 | End: 2018-11-09 | Stop reason: HOSPADM

## 2018-11-09 RX ORDER — MIDAZOLAM HYDROCHLORIDE 2 MG/2ML
INJECTION, SOLUTION INTRAMUSCULAR; INTRAVENOUS
Status: DISCONTINUED | OUTPATIENT
Start: 2018-11-09 | End: 2018-11-09 | Stop reason: HOSPADM

## 2018-11-09 RX ORDER — SODIUM CHLORIDE 9 MG/ML
INJECTION, SOLUTION INTRAMUSCULAR; INTRAVENOUS; SUBCUTANEOUS
Status: DISCONTINUED | OUTPATIENT
Start: 2018-11-09 | End: 2018-11-09 | Stop reason: HOSPADM

## 2018-11-09 RX ORDER — LIDOCAINE HYDROCHLORIDE 20 MG/ML
INJECTION, SOLUTION EPIDURAL; INFILTRATION; INTRACAUDAL; PERINEURAL
Status: DISCONTINUED | OUTPATIENT
Start: 2018-11-09 | End: 2018-11-09 | Stop reason: HOSPADM

## 2018-11-09 RX ORDER — METHYLPREDNISOLONE ACETATE 40 MG/ML
INJECTION, SUSPENSION INTRA-ARTICULAR; INTRALESIONAL; INTRAMUSCULAR; SOFT TISSUE
Status: DISCONTINUED | OUTPATIENT
Start: 2018-11-09 | End: 2018-11-09 | Stop reason: HOSPADM

## 2018-11-09 RX ORDER — SODIUM CHLORIDE, SODIUM LACTATE, POTASSIUM CHLORIDE, CALCIUM CHLORIDE 600; 310; 30; 20 MG/100ML; MG/100ML; MG/100ML; MG/100ML
INJECTION, SOLUTION INTRAVENOUS CONTINUOUS
Status: DISCONTINUED | OUTPATIENT
Start: 2018-11-09 | End: 2018-11-09 | Stop reason: HOSPADM

## 2018-11-09 RX ORDER — LIDOCAINE HYDROCHLORIDE 10 MG/ML
INJECTION, SOLUTION EPIDURAL; INFILTRATION; INTRACAUDAL; PERINEURAL
Status: DISCONTINUED | OUTPATIENT
Start: 2018-11-09 | End: 2018-11-09 | Stop reason: HOSPADM

## 2018-11-09 NOTE — OP NOTE
PROCEDURE DATE: 11/9/2018    PROCEDURE:  Radiofrequency ablation of the bilateral L3,4,5 medial branch nerves on the bilateral-side utilizing fluoroscopy    DIAGNOSIS:  Lumbar spondylosis    Post op Diagnosis: Same    PHYSICIAN: Darinel Carrion MD    MEDICATIONS INJECTED:  From a mixture of 4ml of 2% lidocaine and 40mg of methylprednisone, 1ml of this solution was injected at each level.    LOCAL ANESTHETIC USED: Lidocaine 1%, 3 ml given at each site.    SEDATION MEDICATIONS: 1mg versed, 25mcg fentanyl    ESTIMATED BLOOD LOSS:  nonen    COMPLICATIONS:  none    TECHNIQUE:  A time out was taken to identify patient and procedure side prior to starting the procedure. Laying in a prone position, the patient was prepped and draped in the usual sterile fashion using ChloraPrep and sterile towels.  The levels were determined under fluoroscopic guidance and then marked.  Local anesthetic was given by raising a wheal at the skin over each site and then infiltrated approximately 2cm deeper.  A 20-gauge  100 mm [employer] RF needle was introduced to the anatomic location of the right and then left L3,4,5 medial branch nerves.  Motor stimulation up to 2 Volts at each level confirmed no motor nerve involvement.  Impedance was less than 800 ohms at each level. The above noted medication was then injected slowly.  Ablation was performed per level utilizing [place] radiofrequency generator 80°C for 90 seconds. The patient tolerated the procedure well.     The patient was monitored after the procedure.  Patient was given post procedure and discharge instructions to follow at home.  The patient was discharged in a stable condition

## 2018-11-09 NOTE — H&P
CC: Back pain    HPI: The patient is a 86yo woman with a history of lumbar spondylosis here for bilateral L3,4,5 RFA. There are no major changes in history and physical from 10/4/18.    Past Medical History:   Diagnosis Date    Anticoagulant long-term use     Arthritis     Benign essential tremor 2000    Bilateral    Breast cancer     left , no B/p or sticks to left    Cataract     ou done    Colon polyps     Coronary artery disease     2 stents     DDD (degenerative disc disease), lumbar     Encounter for blood transfusion     GERD (gastroesophageal reflux disease)     Glaucoma     left eye    Hemiparesis affecting left side as late effect of cerebrovascular accident     HLD (hyperlipidemia)     HTN (hypertension)     Hyperparathyroidism     Hypothyroidism     Mobility impaired     uses wheelchair    Neck pain     radiating to both shoulders and arms    Osteoporosis     PVD (peripheral vascular disease)     Stroke     August 2016       Past Surgical History:   Procedure Laterality Date    APPENDECTOMY      BLADDER SUSPENSION      Block-nerve-medial branch-lumbar L3, L4, L5 Bilateral 10/19/2018    Performed by [facility]inel [facility] at [facility] OR    BREAST LUMPECTOMY      no bp/iv left arm    CARDIAC SURGERY      COLONOSCOPY      CORONARY ANGIOPLASTY WITH STENT PLACEMENT      x 2    EGD (ESOPHAGOGASTRODUODENOSCOPY) N/A 7/2/2018    Performed by Jassi Randolph MD at [facility]    Epidural steroid injection      Pain managment    ESOPHAGOGASTRODUODENOSCOPY N/A 7/2/2018    Procedure: EGD (ESOPHAGOGASTRODUODENOSCOPY);  Surgeon: Jassi Randolph MD;  Location: [facility];  Service: Endoscopy;  Laterality: N/A;    EYE SURGERY      bilat phaco with iol    INJECTION OF ANESTHETIC AGENT AROUND MEDIAL BRANCH NERVES INNERVATING LUMBAR FACET JOINT Bilateral 10/19/2018    Procedure: Block-nerve-medial branch-lumbar L3, L4, L5;  Surgeon: Darinel Carrion MD;  Location: [facility] OR;  Service: Pain  Management;  Laterality: Bilateral;    INJECTION, STEROID, SPINE, CERVICAL, EPIDURAL N/A 1/20/2014    [facility]    INJECTION, STEROID, SPINE, CERVICAL, EPIDURAL N/A 11/18/2013    Per[facility] by [facility] at [facility] OR    INJECTION, STEROID, SPINE, CERVICAL, EPIDURAL N/A 8/27/2013    [facility] at [facility] OR    INJECTION, STEROID, SPINE, CERVICAL, EPIDURAL N/A 7/30/2013    Performed by [facility] at [facility] OR    Injection,steroid,epidural,transforaminal approach L5/S1 Left 7/30/2018    [facility] at [facility]    INJECTION-STEROID-EPIDURAL-CAUDAL N/A 1/2/2018    Performed by [facility] at [facility] OR    INJECTION-STEROID-EPIDURAL-CAUDAL N/A 10/28/2016    [facility] at [facility]    INJECTION-STEROID-EPIDURAL-CERVICAL N/A 7/8/2016    Performed by [facility] at [facility]    INJECTION-STEROID-EPIDURAL-CERVICAL N/A 12/22/2015    Performed by [facility] at [facility] OR    INJECTION-STEROID-EPIDURAL-CERVICAL N/A 9/2/2015    [facility] by [facility] at [facility]    INJECTION-STEROID-EPIDURAL-CERVICAL N/A 8/15/2014    [facility] by [facility] at [facility]    INJECTION-STEROID-EPIDURAL-LUMBAR N/A 10/7/2015    [facility] by [facility] at [facility] OR    TONSILLECTOMY      TOTAL ABDOMINAL HYSTERECTOMY         Family History   Problem Relation Age of Onset    Essential Tremor Father     Stroke Father     Heart attack Father     Essential Tremor Sister     Stroke Sister     Essential Tremor Brother     Stroke Brother     Heart attack Brother     Stroke Mother     Heart attack Mother     Stroke Brother     Heart attack Brother     Stroke Brother     Heart attack Brother     Stroke Brother     Heart attack Brother     Stroke Brother     Heart attack Brother     Stroke Sister     Heart disease Sister     Stroke Sister     Heart  "attack Sister     Stroke Sister     Heart attack Sister     Essential Tremor Daughter     Essential Tremor Son     Anesthesia problems Neg Hx     Clotting disorder Neg Hx     Amblyopia Neg Hx     Blindness Neg Hx     Cancer Neg Hx     Cataracts Neg Hx     Diabetes Neg Hx     Glaucoma Neg Hx     Hypertension Neg Hx     Macular degeneration Neg Hx     Retinal detachment Neg Hx     Strabismus Neg Hx        Social History     Socioeconomic History    Marital status:      Spouse name: None    Number of children: None    Years of education: None    Highest education level: None   Social Needs    Financial resource strain: None    Food insecurity - worry: None    Food insecurity - inability: None    Transportation needs - medical: None    Transportation needs - non-medical: None   Occupational History    None   Tobacco Use    Smoking status: Never Smoker    Smokeless tobacco: Never Used   Substance and Sexual Activity    Alcohol use: No    Drug use: No    Sexual activity: None   Other Topics Concern    None   Social History Narrative    None       Current Facility-Administered Medications   Medication Dose Route Frequency Provider Last Rate Last Dose    lactated ringers infusion   Intravenous Continuous Darinel Carrion MD           Review of patient's allergies indicates:   Allergen Reactions    No known drug allergies        Vitals:    11/09/18 1132 11/09/18 1149   BP:  (!) 151/66   Pulse:  (!) 56   Resp:  16   Temp:  97 °F (36.1 °C)   TempSrc:  Tympanic   SpO2:  100%   Weight: 54.4 kg (120 lb)    Height: 5' 3" (1.6 m)        REVIEW OF SYSTEMS:     GENERAL: No weight loss, malaise or fevers.  HEENT:  No recent changes in vision or hearing  NECK: Negative for lumps, no difficulty with swallowing.  RESPIRATORY: Negative for cough, wheezing or shortness of breath, patient denies any recent URI.  CARDIOVASCULAR: Negative for chest pain, leg swelling or palpitations.  GI: Negative " for abdominal discomfort, blood in stools or black stools or change in bowel habits.  MUSCULOSKELETAL: See HPI.  SKIN: Negative for lesions, rash, and itching.  PSYCH: No suicidal or homicidal ideations, no current mood disturbances.  HEMATOLOGY/LYMPHOLOGY: Negative for prolonged bleeding, bruising easily or swollen nodes. Patient is not currently taking any anti-coagulants  ENDO: No history of diabetes or thyroid dysfunction  NEURO: No history of syncope, paralysis, seizures or tremors.All other reviewed and negative other than HPI.    Physical exam:  Gen: A and O x3, pleasant, well-groomed  Skin: No rashes or obvious lesions  HEENT: PERRLA, no obvious deformities on ears or in canals. No thyroid masses, trachea midline, no palpable lymph nodes in neck, axilla.  CVS: Regular rate and rhythm, normal S1 and S2, no murmurs.  Resp: Clear to auscultation bilaterally.  Abdomen: Soft, NT/ND, normal bowel sounds present.  Musculoskeletal/Neuro: Moving all extremities    Assessment:  Lumbar spondylosis  -     Case Request Operating Room: RADIOFREQUENCY ABLATION, NERVE, SPINAL, LUMBAR, MEDIAL BRANCH, L3, L4, L5  -     Place in Outpatient; Standing  -     Diet NPO; Standing  -     lactated ringers infusion  -     Notify physician ; Standing  -     Notify physician ; Standing  -     Notify physician (specify); Standing  -     Place 18-22 [facility] IV ; Standing  -     Verify informed consent; Standing  -     Vital signs; Standing

## 2018-11-09 NOTE — DISCHARGE INSTRUCTIONS
Recovery After Procedural Sedation (Adult)  You have been given medicine by vein to make you sleep during your surgery. This may have included both a pain medicine and sleeping medicine. Most of the effects have worn off. But you may still have some drowsiness for the next 6 to 8 hours.  Home care  Follow these guidelines when you get home:  · For the next 8 hours, you should be watched by a responsible adult. This person should make sure your condition is not getting worse.  · Don't drink any alcohol for the next 24 hours.  · Don't drive, operate dangerous machinery, or make important business or personal decisions during the next 24 hours.  Note: Your healthcare provider may tell you not to take any medicine by mouth for pain or sleep in the next 4 hours. These medicines may react with the medicines you were given in the hospital. This could cause a much stronger response than usual.  Follow-up care  Follow up with your healthcare provider if you are not alert and back to your usual level of activity within 12 hours.  When to seek medical advice  Call your healthcare provider right away if any of these occur:  · Drowsiness gets worse  · Weakness or dizziness gets worse  · Repeated vomiting  · You can't be awakened   Date Last Reviewed: 10/18/2016  © 3209-8920 [employer]. [address] [place] [address], [place] [address]. All rights reserved. This information is not intended as a substitute for professional medical care. Always follow your healthcare professional's instructions.    Home care instructions  Apply ice pack to the injection site for 20 minutes periods for the first 24 hrs for soreness/discomfort at injection site DO NOT USE HEAT FOR 24 HOURS  Keep site clean and dry for 24 hours, remove bandaid when desired  Do not drive until tomorrow  Take care when walking after a lumbar injection  Avoid strenuous activities for 2 days  Make take 2 weeks to feel the full effects   Resume home medication as  prescribed today  Resume Aspirin, Plavix, or Coumadin the day after the procedure unless otherwise instructed.    SEE IMMEDIATE MEDICAL HELP FOR:  Severe increase in your usual pain or appearance of new pain  Prolonged or increasing weakness or numbness in the legs or arms  Drainage, redness, active bleeding, or increased swelling at the injection site  Temperature over 100.0 degrees F.  Headache that increases when your head is upright and decreases when you lie flat    CALL 911 OR GO DIRECTLY TO EMERGENCY DEPARTMENT FOR:  Shortness of breath, chest pain, or problems breathing

## 2018-11-09 NOTE — DISCHARGE SUMMARY
[facility]  Discharge Note  Short Stay    Admit Date: 11/9/2018    Discharge Date: 11/9/2018    Attending Physician: Darinel Carrion MD     Discharge Provider: Darinel Carrion    Diagnoses:  Active Hospital Problems    Diagnosis  POA    *Lumbar spondylosis [M47.816]  Yes      Resolved Hospital Problems   No resolved problems to display.       Discharged Condition: good    Final Diagnoses: Lumbar spondylosis [M47.816]    Disposition: Home or Self Care    Hospital Course: no complications, uneventful    Outcome of Hospitalization, Treatment, Procedure, or Surgery:  Patient was admitted for outpatient procedure. The patient underwent procedure without complications and are discharged home    Follow up/Patient Instructions:  Follow up as scheduled in Pain Management clinic in 3-4 weeks/Patient has received instructions and follow up date and time    Medications:  Continue previous medications    Discharge Procedure Orders   Call MD for:  temperature >100.4     Call MD for:  severe uncontrolled pain     Call MD for:  redness, tenderness, or signs of infection (pain, swelling, redness, odor or green/yellow discharge around incision site)     Call MD for:  severe persistent headache     No dressing needed         Discharge Procedure Orders (must include Diet, Follow-up, Activity):   Discharge Procedure Orders (must include Diet, Follow-up, Activity)   Call MD for:  temperature >100.4     Call MD for:  severe uncontrolled pain     Call MD for:  redness, tenderness, or signs of infection (pain, swelling, redness, odor or green/yellow discharge around incision site)     Call MD for:  severe persistent headache     No dressing needed      f

## 2018-11-11 NOTE — TELEPHONE ENCOUNTER
Bedside and Verbal shift change report given to Neo Marroquin RN (oncoming nurse) by Naif Stokes RN (offgoing nurse). Report included the following information SBAR, Kardex, ED Summary, [facility] and Recent Results. She needs to figure out who is taking her medication because she should definitely not be out if she is taking this only 3 times daily. I cannot prescribe more medication if someone in her house is stealing it.

## 2018-11-15 RX ORDER — HYDROCODONE BITARTRATE AND ACETAMINOPHEN 10; 325 MG/1; MG/1
1 TABLET ORAL EVERY 6 HOURS PRN
Qty: 40 TABLET | Refills: 0 | Status: SHIPPED | OUTPATIENT
Start: 2018-11-15 | End: 2018-11-25

## 2018-11-15 NOTE — TELEPHONE ENCOUNTER
reviewed, consistent.  Will refill #40 tabs, will need to schedule f/u in office with Dr. Carrion or Toby.

## 2018-11-15 NOTE — TELEPHONE ENCOUNTER
----- Message from Cristy Bacon sent at 11/15/2018  8:29 AM CST -----  Type:  RX Refill Request    Who Called:  Patient  RX Name and Strength:  HYDROcodone-acetaminophen (NORCO)  mg per tablet  Preferred Pharmacy with phone number:  [facility] at [phone] (Phone)  Best Call Back Number:  [phone]  Additional Information:  Please call patient back to advise.

## 2018-11-16 NOTE — TELEPHONE ENCOUNTER
Patient informed her medication was sent to the pharmacy. She will call in the future to schedule.

## 2018-11-20 ENCOUNTER — OUTPATIENT CASE MANAGEMENT (OUTPATIENT)
Dept: ADMINISTRATIVE | Facility: OTHER | Age: 83
End: 2018-11-20

## 2018-11-20 NOTE — PROGRESS NOTES
11/20/2018  Summary:  RNBRADEN contacted patient to follow up for OPCM. Spoke with patient via telephone. She reports that she is doing pretty well. She states that she still has some pain since the ablation, but it is much better and is well controlled with the pain medication that she is taking. We reviewed both invasive and non-invasive methods of pain relief and patient was able to correctly articulate Nerve Block and Ablation for invasive methods; ice, heat and medication for non-invasive methods. Patient states she utilized all 3 of the non-invasive methods as needed. She states that she still has to make an appointment to follow up with Pain Management, PA, Roshan Blankenship as well as her PCP, Dr. Houston Sheppard. I offered to make the appointments for her, but patient declined, stating that she has to find out when her son can bring her. She states that he works in [place] and he is the one who brings her to all of her appointments. She is trying to get both appointments on the same day and wants to wait until after Thanksgiving to make them. Encouraged patient not to wait too long to try to get the appointments; verbalized understanding. We reviewed the signs and symptoms of stroke using the FAST pneumonic. Patient was able to correctly articulate 2 signs and symptoms: one side weak, speech problems  She denies any signs and symptoms of stroke.  Patient denied any other needs at this time. We discussed case closure at this time as all nursing identified, patient-centered goals have been met. Patient is in agreement with case closure. Patient expressed appreciation for assistance provided and for calling and checking on her. Encouraged patient to reach out to OPCM if any needs arise in the future that we could assist her with. Patient verbalized understanding. Carrol Gomes RN-OPCM    Interventions:  - Reviewed both invasive and non-invasive methods of pain relief measures with patient.   - Encouraged  compliance with Physician follow-ups.  - Empowered patient to discuss treatment plan with Physician/care team.   - Encouraged Exercise as tolerated.   - Encouraged Medication Compliance.   - Reviewed signs and symptoms of Stroke with patient utilizing the FAST pneumonic.   - Encouraged compliance with scheduled laboratory testing and procedure.   - Encouraged Dietary Compliance (Low Sodium).   - Confirmed that patient had contact information for OPCM and [facility] 24/7 Care Line.

## 2018-11-27 RX ORDER — RABEPRAZOLE SODIUM 20 MG/1
TABLET, DELAYED RELEASE ORAL
Qty: 30 TABLET | Refills: 0 | Status: SHIPPED | OUTPATIENT
Start: 2018-11-27 | End: 2019-01-02 | Stop reason: SDUPTHER

## 2018-11-29 ENCOUNTER — TELEPHONE (OUTPATIENT)
Dept: PAIN MEDICINE | Facility: CLINIC | Age: 83
End: 2018-11-29

## 2018-11-29 RX ORDER — HYDROCODONE BITARTRATE AND ACETAMINOPHEN 10; 325 MG/1; MG/1
1 TABLET ORAL EVERY 6 HOURS PRN
Qty: 40 TABLET | Refills: 0 | Status: SHIPPED | OUTPATIENT
Start: 2018-11-29 | End: 2018-12-10 | Stop reason: SDUPTHER

## 2018-11-29 NOTE — TELEPHONE ENCOUNTER
----- Message from Huy Benoit sent at 11/29/2018  8:08 AM CST -----  Type:  RX Refill Request    Who Called:  Patient  Refill or New Rx:  Refill  RX Name and Strength:  Hydrocodone  Preferred Pharmacy with phone number:    [facility] [address] [address]  [id number] [place] [address]  Phone: [phone] Fax: [phone]  Local or Mail Order:  Local  Ordering Provider:  Same  Best Call Back Number:  [phone] (home)

## 2018-12-10 ENCOUNTER — TELEPHONE (OUTPATIENT)
Dept: FAMILY MEDICINE | Facility: CLINIC | Age: 83
End: 2018-12-10

## 2018-12-10 RX ORDER — HYDROCODONE BITARTRATE AND ACETAMINOPHEN 10; 325 MG/1; MG/1
1 TABLET ORAL EVERY 6 HOURS PRN
Qty: 40 TABLET | Refills: 0 | Status: ON HOLD | OUTPATIENT
Start: 2018-12-10 | End: 2018-12-22 | Stop reason: HOSPADM

## 2018-12-10 NOTE — TELEPHONE ENCOUNTER
Patient states have been having some Urine incontinence/frequency please advise pt states all the time

## 2018-12-10 NOTE — TELEPHONE ENCOUNTER
----- Message from Elvis Wilder sent at 12/10/2018  8:52 AM CST -----  Contact: patient  Type: Needs Medical Advice    Who Called:  Patient  Symptoms (please be specific):    How long has patient had these symptoms:   Pharmacy name and phone #:    Best Call Back Number: [phone]  Additional Information: requesting a call back regarding kidney issues

## 2018-12-10 NOTE — TELEPHONE ENCOUNTER
----- Message from Elvis Wilder sent at 12/10/2018  8:49 AM CST -----  Contact: patient  Type:  RX Refill Request    Who Called:  Patient  Refill or New Rx:  refill  RX Name and Strength:  HYDROcodone-acetaminophen (NORCO)  mg per tablet  How is the patient currently taking it? (ex. 1XDay):    Is this a 30 day or 90 day RX:    Preferred Pharmacy with phone number:  [facility]  Local or Mail Order:  local  Ordering Provider:  Dr.Harrison Aleman Call Back Number:  [phone]  Additional Information:  Requesting a call back when script has been sent

## 2018-12-17 PROBLEM — I95.9 HYPOTENSION: Status: ACTIVE | Noted: 2018-12-17

## 2018-12-17 PROBLEM — N30.00 ACUTE CYSTITIS WITHOUT HEMATURIA: Status: ACTIVE | Noted: 2018-12-17

## 2018-12-18 PROBLEM — I48.0 PAROXYSMAL ATRIAL FIBRILLATION: Status: ACTIVE | Noted: 2018-12-18

## 2018-12-22 PROBLEM — I95.9 HYPOTENSION: Status: RESOLVED | Noted: 2018-12-17 | Resolved: 2018-12-22

## 2018-12-22 PROBLEM — N30.00 ACUTE CYSTITIS WITHOUT HEMATURIA: Status: RESOLVED | Noted: 2018-12-17 | Resolved: 2018-12-22

## 2018-12-26 RX ORDER — HYDROCODONE BITARTRATE AND ACETAMINOPHEN 5; 325 MG/1; MG/1
1 TABLET ORAL EVERY 6 HOURS PRN
Qty: 20 TABLET | Refills: 0 | Status: CANCELLED | OUTPATIENT
Start: 2018-12-26

## 2018-12-26 RX ORDER — HYDROCODONE BITARTRATE AND ACETAMINOPHEN 10; 325 MG/1; MG/1
1 TABLET ORAL EVERY 6 HOURS PRN
Qty: 40 TABLET | Refills: 0 | Status: SHIPPED | OUTPATIENT
Start: 2018-12-26 | End: 2019-01-04 | Stop reason: SDUPTHER

## 2018-12-26 NOTE — TELEPHONE ENCOUNTER
----- Message from RT Jose Luis sent at 12/26/2018  8:35 AM CST -----  Contact: pt    pt , Rx refill: Hydrocodone, thanks.

## 2018-12-26 NOTE — TELEPHONE ENCOUNTER
----- Message from Ugo Miller sent at 12/26/2018  1:42 PM CST -----  Who Called:pt  Refill or New Rx:   refill  RX Name and Strength:  HYDROcodone-acetaminophen (NORCO) 5-325 mg per tablet   [place]    [facility] [place] [address] [address] [address]  Phone: [phone] Fax: [phone]  Local or Mail Order:  local  Ordering Provider:  same  Best Call Back Number:  [phone]  Additional Information: please call pt when completed to advise.please leave pt a detailed message if there is no answer.

## 2018-12-31 ENCOUNTER — TELEPHONE (OUTPATIENT)
Dept: FAMILY MEDICINE | Facility: CLINIC | Age: 83
End: 2018-12-31

## 2018-12-31 NOTE — TELEPHONE ENCOUNTER
Pt was discharged 12-22-18 from [facility]. Pt dx with UTI /  hypotension . Pt says that her meds have changed . Pt scherd for hosp f/u on 1-7-19 ( that is when pt has transportation) . Will discuss meds at that appt .--lp

## 2018-12-31 NOTE — TELEPHONE ENCOUNTER
----- Message from Margaret Herrera sent at 12/31/2018  1:22 PM CST -----  Contact: self  Patient [phone] is calling to discuss a medication that she was on while in the hospital and now the hospital is wanting her to stop the medication/please call patient to discuss/also patient is saying that she needs a two weeks hospital followup app[place] - for next week/please advise

## 2019-01-01 ENCOUNTER — TELEPHONE (OUTPATIENT)
Dept: ADMINISTRATIVE | Facility: CLINIC | Age: 84
End: 2019-01-01

## 2019-01-01 NOTE — TELEPHONE ENCOUNTER
Home Health SOC 12/23/2018 to 02/20/2019 with [facility], Dr Vito Perkins. SN, PT, OT, HHA services.

## 2019-01-02 ENCOUNTER — TELEPHONE (OUTPATIENT)
Dept: FAMILY MEDICINE | Facility: CLINIC | Age: 84
End: 2019-01-02

## 2019-01-03 RX ORDER — RABEPRAZOLE SODIUM 20 MG/1
TABLET, DELAYED RELEASE ORAL
Qty: 30 TABLET | Refills: 11 | Status: SHIPPED | OUTPATIENT
Start: 2019-01-03

## 2019-01-03 NOTE — TELEPHONE ENCOUNTER
----- Message from Dory Gagnon sent at 1/3/2019 10:54 AM CST -----  Contact: April with [facility]  The patient has had over this week two incidents of having blood in her stool.  Call Back#[phone]  Thanks

## 2019-01-03 NOTE — TELEPHONE ENCOUNTER
April w/ HH calling stating pt has had 2 episodes of blood in her stool vitals wnl patient c/o weakness but pt also is doing PT please advise.

## 2019-01-04 RX ORDER — HYDROCODONE BITARTRATE AND ACETAMINOPHEN 10; 325 MG/1; MG/1
1 TABLET ORAL EVERY 6 HOURS PRN
Qty: 60 TABLET | Refills: 0 | Status: SHIPPED | OUTPATIENT
Start: 2019-01-04 | End: 2019-01-18 | Stop reason: SDUPTHER

## 2019-01-04 NOTE — TELEPHONE ENCOUNTER
----- Message from Darcy Flynn sent at 1/4/2019  8:51 AM CST -----  Contact: Patient  Type:  RX Refill Request    Who Called:  Patient  Refill or New Rx:  refill  RX Name and Strength:  Darinel Carrion MD  How is the patient currently taking it? (ex. 1XDay):  4x day  Is this a 30 day or 90 day RX:  10 day  Preferred Pharmacy with phone number:    [address] [address]  Phone: [phone] Fax: [phone]     Local or Mail Order:  local  Ordering Provider:  Dr. Carrion  Best Call Back Number:  [phone] (home)    Additional Information:  devora

## 2019-01-07 ENCOUNTER — TELEPHONE (OUTPATIENT)
Dept: PAIN MEDICINE | Facility: CLINIC | Age: 84
End: 2019-01-07

## 2019-01-07 NOTE — TELEPHONE ENCOUNTER
----- Message from Traci Martin sent at 1/7/2019  8:52 AM CST -----  Contact: Self  The patient has another appt on Monday 1/14 at 11 AM and her appt with Roshan is on 1/9.  She does not have a ride on 1/9 so she is asking if you can change that appt to Monday 1/14 after 12 noon.  I couldn't reschedule it until 1/23, so please call back at [phone] (home).  Thanks

## 2019-01-11 PROBLEM — K92.2 ACUTE LOWER GI BLEEDING: Status: ACTIVE | Noted: 2019-01-11

## 2019-01-14 RX ORDER — LEVOTHYROXINE SODIUM 50 UG/1
TABLET ORAL
Qty: 30 TABLET | Refills: 11 | Status: SHIPPED | OUTPATIENT
Start: 2019-01-14

## 2019-01-18 RX ORDER — HYDROCODONE BITARTRATE AND ACETAMINOPHEN 10; 325 MG/1; MG/1
1 TABLET ORAL EVERY 6 HOURS PRN
Qty: 60 TABLET | Refills: 0 | Status: SHIPPED | OUTPATIENT
Start: 2019-01-18 | End: 2019-02-01 | Stop reason: SDUPTHER

## 2019-01-18 NOTE — TELEPHONE ENCOUNTER
----- Message from Megan James sent at 1/18/2019  8:50 AM CST -----  Contact: pt  Pt states needing a refill on her HYDROcodone-acetaminophen (NORCO)  mg per tablet.[phone] (home)         .  [facility] [place] [address] [address] [address]  Phone: [phone] Fax: [phone]

## 2019-01-18 NOTE — TELEPHONE ENCOUNTER
reviewed.  Patient had f/u that was cancelled on 1/14/19.  Will refill hydrocodone 10/325mg po q6h prn #60 tabs.  Sent to [place] pharmacy. Please have the patient schedule a follow up with Dr. Carrion or Toby in the next two weeks prior to another refill.  thanks

## 2019-01-24 ENCOUNTER — TELEPHONE (OUTPATIENT)
Dept: NEUROLOGY | Facility: CLINIC | Age: 84
End: 2019-01-24

## 2019-01-24 NOTE — TELEPHONE ENCOUNTER
----- Message from Misty Escobar sent at 1/24/2019 10:50 AM CST -----  Type: Needs Medical Advice    Who Called: Patient    Best Call Back Number: [phone] (home)     Additional Information: Would like to see if she can reschedule her appt with Dr. Gaines for 2/1/19, due to transportation

## 2019-01-24 NOTE — TELEPHONE ENCOUNTER
Pt unable to come to scheduled appt date. Pt advised of next appt date. Pt to call back in 2 weeks when schedule opens in April to reschedule appt to first available. Pt gave teach back.

## 2019-02-01 ENCOUNTER — LAB VISIT (OUTPATIENT)
Dept: LAB | Facility: HOSPITAL | Age: 84
End: 2019-02-01
Attending: INTERNAL MEDICINE
Payer: MEDICARE

## 2019-02-01 ENCOUNTER — OFFICE VISIT (OUTPATIENT)
Dept: FAMILY MEDICINE | Facility: CLINIC | Age: 84
End: 2019-02-01
Payer: MEDICARE

## 2019-02-01 ENCOUNTER — OFFICE VISIT (OUTPATIENT)
Dept: CARDIOLOGY | Facility: CLINIC | Age: 84
End: 2019-02-01
Payer: MEDICARE

## 2019-02-01 VITALS
HEIGHT: 62 IN | SYSTOLIC BLOOD PRESSURE: 98 MMHG | HEART RATE: 50 BPM | BODY MASS INDEX: 20.27 KG/M2 | DIASTOLIC BLOOD PRESSURE: 50 MMHG

## 2019-02-01 VITALS
BODY MASS INDEX: 19.32 KG/M2 | SYSTOLIC BLOOD PRESSURE: 105 MMHG | DIASTOLIC BLOOD PRESSURE: 45 MMHG | HEIGHT: 62 IN | HEART RATE: 47 BPM | WEIGHT: 105 LBS

## 2019-02-01 DIAGNOSIS — I65.23 BILATERAL CAROTID ARTERY STENOSIS: ICD-10-CM

## 2019-02-01 DIAGNOSIS — K92.2 ACUTE LOWER GI BLEEDING: ICD-10-CM

## 2019-02-01 DIAGNOSIS — Z95.5 STATUS POST CORONARY ARTERY STENT PLACEMENT: ICD-10-CM

## 2019-02-01 DIAGNOSIS — I50.32 CHRONIC DIASTOLIC HEART FAILURE: ICD-10-CM

## 2019-02-01 DIAGNOSIS — E21.3 HYPERPARATHYROIDISM: ICD-10-CM

## 2019-02-01 DIAGNOSIS — E03.4 HYPOTHYROIDISM DUE TO ACQUIRED ATROPHY OF THYROID: ICD-10-CM

## 2019-02-01 DIAGNOSIS — I48.0 PAROXYSMAL ATRIAL FIBRILLATION: ICD-10-CM

## 2019-02-01 DIAGNOSIS — I25.10 CORONARY ARTERY DISEASE INVOLVING NATIVE CORONARY ARTERY OF NATIVE HEART WITHOUT ANGINA PECTORIS: ICD-10-CM

## 2019-02-01 DIAGNOSIS — G81.94 LEFT HEMIPARESIS: ICD-10-CM

## 2019-02-01 DIAGNOSIS — Z00.00 ENCOUNTER FOR PREVENTIVE HEALTH EXAMINATION: Primary | ICD-10-CM

## 2019-02-01 DIAGNOSIS — E55.9 VITAMIN D DEFICIENCY DISEASE: ICD-10-CM

## 2019-02-01 DIAGNOSIS — I10 HYPERTENSION, UNSPECIFIED TYPE: ICD-10-CM

## 2019-02-01 DIAGNOSIS — I73.9 PVD (PERIPHERAL VASCULAR DISEASE): ICD-10-CM

## 2019-02-01 LAB
ALBUMIN SERPL BCP-MCNC: 3.2 G/DL
ALP SERPL-CCNC: 85 U/L
ALT SERPL W/O P-5'-P-CCNC: 10 U/L
ANION GAP SERPL CALC-SCNC: 5 MMOL/L
AST SERPL-CCNC: 17 U/L
BASOPHILS # BLD AUTO: 0.09 K/UL
BASOPHILS NFR BLD: 1.4 %
BILIRUB SERPL-MCNC: 0.2 MG/DL
BUN SERPL-MCNC: 22 MG/DL
CALCIUM SERPL-MCNC: 10 MG/DL
CHLORIDE SERPL-SCNC: 114 MMOL/L
CO2 SERPL-SCNC: 23 MMOL/L
CREAT SERPL-MCNC: 0.7 MG/DL
DIFFERENTIAL METHOD: ABNORMAL
EOSINOPHIL # BLD AUTO: 0.1 K/UL
EOSINOPHIL NFR BLD: 1.4 %
ERYTHROCYTE [DISTWIDTH] IN BLOOD BY AUTOMATED COUNT: 17.9 %
EST. GFR  (AFRICAN AMERICAN): >60 ML/MIN/1.73 M^2
EST. GFR  (NON AFRICAN AMERICAN): >60 ML/MIN/1.73 M^2
GLUCOSE SERPL-MCNC: 95 MG/DL
HCT VFR BLD AUTO: 26.3 %
HGB BLD-MCNC: 7.1 G/DL
IMM GRANULOCYTES # BLD AUTO: 0.02 K/UL
IMM GRANULOCYTES NFR BLD AUTO: 0.3 %
LYMPHOCYTES # BLD AUTO: 1 K/UL
LYMPHOCYTES NFR BLD: 15.6 %
MCH RBC QN AUTO: 27.4 PG
MCHC RBC AUTO-ENTMCNC: 27 G/DL
MCV RBC AUTO: 102 FL
MONOCYTES # BLD AUTO: 0.4 K/UL
MONOCYTES NFR BLD: 6.4 %
NEUTROPHILS # BLD AUTO: 4.9 K/UL
NEUTROPHILS NFR BLD: 74.9 %
NRBC BLD-RTO: 0 /100 WBC
PLATELET # BLD AUTO: 449 K/UL
PMV BLD AUTO: 10.4 FL
POTASSIUM SERPL-SCNC: 3.9 MMOL/L
PROT SERPL-MCNC: 5.9 G/DL
RBC # BLD AUTO: 2.59 M/UL
SODIUM SERPL-SCNC: 142 MMOL/L
WBC # BLD AUTO: 6.53 K/UL

## 2019-02-01 PROCEDURE — 99999 PR PBB SHADOW E&M-EST. PATIENT-LVL III: CPT | Mod: PBBFAC,,, | Performed by: INTERNAL MEDICINE

## 2019-02-01 PROCEDURE — G0439 PR MEDICARE ANNUAL WELLNESS SUBSEQUENT VISIT: ICD-10-PCS | Mod: S$GLB,,, | Performed by: NURSE PRACTITIONER

## 2019-02-01 PROCEDURE — 99999 PR PBB SHADOW E&M-EST. PATIENT-LVL IV: ICD-10-PCS | Mod: PBBFAC,,, | Performed by: NURSE PRACTITIONER

## 2019-02-01 PROCEDURE — 99214 PR OFFICE/OUTPT VISIT, EST, LEVL IV, 30-39 MIN: ICD-10-PCS | Mod: S$PBB,,, | Performed by: INTERNAL MEDICINE

## 2019-02-01 PROCEDURE — 99999 PR PBB SHADOW E&M-EST. PATIENT-LVL IV: CPT | Mod: PBBFAC,,, | Performed by: NURSE PRACTITIONER

## 2019-02-01 PROCEDURE — 99213 OFFICE O/P EST LOW 20 MIN: CPT | Mod: PBBFAC,27,PO | Performed by: INTERNAL MEDICINE

## 2019-02-01 PROCEDURE — 99999 PR PBB SHADOW E&M-EST. PATIENT-LVL III: ICD-10-PCS | Mod: PBBFAC,,, | Performed by: INTERNAL MEDICINE

## 2019-02-01 PROCEDURE — 99214 OFFICE O/P EST MOD 30 MIN: CPT | Mod: PBBFAC,PO | Performed by: NURSE PRACTITIONER

## 2019-02-01 PROCEDURE — 85025 COMPLETE CBC W/AUTO DIFF WBC: CPT

## 2019-02-01 PROCEDURE — 80053 COMPREHEN METABOLIC PANEL: CPT

## 2019-02-01 PROCEDURE — G0439 PPPS, SUBSEQ VISIT: HCPCS | Mod: S$GLB,,, | Performed by: NURSE PRACTITIONER

## 2019-02-01 PROCEDURE — 36415 COLL VENOUS BLD VENIPUNCTURE: CPT | Mod: PO

## 2019-02-01 PROCEDURE — 99214 OFFICE O/P EST MOD 30 MIN: CPT | Mod: S$PBB,,, | Performed by: INTERNAL MEDICINE

## 2019-02-01 RX ORDER — HYDROCODONE BITARTRATE AND ACETAMINOPHEN 10; 325 MG/1; MG/1
1 TABLET ORAL EVERY 6 HOURS PRN
Qty: 60 TABLET | Refills: 0 | Status: SHIPPED | OUTPATIENT
Start: 2019-02-01 | End: 2019-02-14

## 2019-02-01 NOTE — PATIENT INSTRUCTIONS
Counseling and Referral of Other Preventative  (Italic type indicates deductible and co-insurance are waived)    Patient Name: Martha Peres  Today's Date: 2/1/2019    Health Maintenance       Date Due Completion Date    Zoster Vaccine 11/14/1992 ---    Lipid Panel 06/30/2023 6/30/2018    TETANUS VACCINE 07/26/2027 7/26/2017 (Declined)    Override on 7/26/2017: Declined        No orders of the defined types were placed in this encounter.    The following information is provided to all patients.  This information is to help you find resources for any of the problems found today that may be affecting your health:                Living healthy guide: www.[facility].[place].[facility]      Understanding Diabetes: www.diabetes.org      Eating healthy: www.cdc.gov/healthyweight      [facility] home safety checklist: www.cdc.gov/steadi/patient.html      Agency on Aging: www.goea.[place].[facility]      Alcoholics anonymous (AA): www.aa.org      Physical Activity: www.citlali.nih.gov/xi8wukn      Tobacco use: www.quitwithusla.org

## 2019-02-01 NOTE — PROGRESS NOTES
Subjective:    Patient ID:  Martha Peres is a 86 y.o. female who presents for evaluation of Hospital Follow Up; Dizziness; and Hypotension      Pt here for hospital f/u. She has had a few hospitalizations since last visit in August. In December she was in for a bladder infection and was in atrial fibrillation. She was started on eliquis and amiodarone. She was readmitted in January with a lower GI bleed from AVM. The plavix was stopped and the eliquis restarted. She says she has been doing ok but has bouts of hypotension and very slow HR. No SOB, No chest pain or fast heart rhythms. Reports no more rectal bleeding.        Review of Systems   Constitution: Negative for weight gain and weight loss.   HENT: Negative.    Eyes: Negative.    Cardiovascular: Negative for chest pain, claudication, cyanosis, dyspnea on exertion, irregular heartbeat, leg swelling, near-syncope, orthopnea (no PND), palpitations and syncope.   Respiratory: Negative for cough, hemoptysis, shortness of breath and snoring.    Endocrine: Negative.    Skin: Negative.    Musculoskeletal: Negative for joint pain, muscle cramps, muscle weakness and myalgias.   Gastrointestinal: Negative for diarrhea, hematemesis, nausea and vomiting.   Genitourinary: Negative.    Neurological: Negative for dizziness, focal weakness, light-headedness, loss of balance, numbness, paresthesias and seizures.   Psychiatric/Behavioral: Negative.         Objective:    Physical Exam   Constitutional: She is oriented to person, place, and time. She appears well-developed and well-nourished.   Eyes: Pupils are equal, round, and reactive to light.   Neck: Normal range of motion. No thyromegaly present.   Cardiovascular: Normal rate, regular rhythm, S1 normal, S2 normal, normal heart sounds, intact distal pulses and normal pulses.  No extrasystoles are present. PMI is not displaced. Exam reveals no friction rub.   No murmur heard.  Pulmonary/Chest: Effort normal and breath sounds  normal. She has no wheezes. She has no rales. She exhibits no tenderness.   Abdominal: Soft. Bowel sounds are normal. She exhibits no distension and no mass. There is no tenderness.   Musculoskeletal: Normal range of motion. She exhibits no edema.   Neurological: She is alert and oriented to person, place, and time.   Skin: Skin is warm and dry.   Vitals reviewed.      Test(s) Reviewed  I have reviewed the following in detail:  [] Stress test   [] Angiography   [x] Echocardiogram   [] Labs   [x] Other:  Hospital records       Assessment:       1. Coronary artery disease involving native coronary artery of native heart without angina pectoris    2. Status post coronary artery stent placement - coated stent LCX, RCA  07/26/2011    3. Paroxysmal atrial fibrillation    4. Acute lower GI bleeding         Plan:       CBC, CMP  Reduce the eliquis to 2.5 mg bid  Reduce the lopressor to 12.5 mg bid  Hold lisinopril for SBP <100  F/u 1 month

## 2019-02-01 NOTE — PROGRESS NOTES
"Martha Peres presented for a  Medicare AWV and comprehensive Health Risk Assessment today. The following components were reviewed and updated:    · Medical history  · Family History  · Social history  · Allergies and Current Medications  · Health Risk Assessment  · Health Maintenance  · Care Team     ** See Completed Assessments for Annual Wellness Visit within the encounter summary.**     The following assessments were completed:  · Living Situation  · CAGE  · Depression Screening  · Timed Get Up and Go  · Whisper Test  · Cognitive Function Screening- unable to fully assess due to tremors (followed by neurology)  · Nutrition Screening  · ADL Screening  · PAQ Screening    Vitals:    02/01/19 0912   BP: (!) 98/50   Pulse: (!) 50   Height: 5' 2" (1.575 m)     Body mass index is 20.27 kg/m².  Physical Exam   Constitutional: She is oriented to person, place, and time. She appears well-nourished.   Cardiovascular: Normal rate, regular rhythm, normal heart sounds and intact distal pulses.   Pulmonary/Chest: Effort normal and breath sounds normal.   Neurological: She is alert and oriented to person, place, and time.   Skin: Skin is warm and dry.   Vitals reviewed.        Diagnoses and health risks identified today and associated recommendations/orders:    1. Encounter for preventive health examination  Reviewed and discussed health maintenance.   Declined shingles vaccine today    2. Hyperparathyroidism  Continue PCP follow up    3. Chronic diastolic heart failure  Continue PCP and cardiology (Dr. De Leon) follow up    4. Left hemiparesis  Stable- continue current treatment and follow up routinely with PCP     5. Hypothyroidism due to acquired atrophy of thyroid  Stable- continue current treatment and follow up routinely with PCP     6. Vitamin D deficiency disease  Stable- continue current treatment and follow up routinely with PCP     7. Paroxysmal atrial fibrillation  Stable- continue current treatment and follow up " routinely with PCP and cardiology (Dr. De Leon) follow up     8. Bilateral carotid artery stenosis  Stable- continue current treatment and follow up routinely with PCP and cardiology (Dr. De Leon) follow up     9. Hypertension, unspecified type  Stable- continue current treatment and follow up routinely with PCP and cardiology (Dr. De Leon) follow up     10. PVD (peripheral vascular disease)  Stable- continue current treatment and follow up routinely with PCP and cardiology (Dr. De Leon) follow up     Provided [place] with a 5-10 year written screening schedule and personal prevention plan. Recommendations were developed using the USPSTF age appropriate recommendations. Education, counseling, and referrals were provided as needed. After Visit Summary printed and given to patient which includes a list of additional screenings\tests needed.    Taylor Barron NP

## 2019-02-01 NOTE — TELEPHONE ENCOUNTER
----- Message from Stefany Hill sent at 2/1/2019  7:30 AM CST -----  Contact: Patient  Type:  RX Refill Request    Who Called:  Patient  Refill or New Rx:  Refill  RX Name and Strength:  HYDROcodone-acetaminophen (NORCO)  mg per tablet  How is the patient currently taking it? (ex. 1XDay):  Take 1 tablet by mouth every 6 (six) hours as needed. - Oral  Is this a 30 day or 90 day RX:  60 tablet  Preferred Pharmacy with phone number:    [facility]- [address] [address]  Phone: [phone] Fax: [phone]  Local or Mail Order:  local  Ordering Provider:  Dr Darinel Carrion  Best Call Back Number:  [phone]  Additional Information:  Calling to request a refill. Please advise

## 2019-02-04 ENCOUNTER — TELEPHONE (OUTPATIENT)
Dept: FAMILY MEDICINE | Facility: CLINIC | Age: 84
End: 2019-02-04

## 2019-02-04 ENCOUNTER — TELEPHONE (OUTPATIENT)
Dept: CARDIOLOGY | Facility: CLINIC | Age: 84
End: 2019-02-04

## 2019-02-04 NOTE — TELEPHONE ENCOUNTER
----- Message from Luiza Ashton sent at 2/4/2019  3:07 PM CST -----  Contact: Dulce Maria alfonso/ [facility]  ph#[phone]  Dulce Maria alfonso/ [facility]#[phone]  Patient having mental status changes   nurse will make visit on 2/5/19, can she get UA and culture   Fax#[phone]

## 2019-02-04 NOTE — TELEPHONE ENCOUNTER
See note from 02/01/2019  I called Pt with lab results. She had recent admission with GI bleed. She was seen in clinic last week for f/u and BP was low and by her report was running well below 100 systolic.   We reduced her meds, in particular, the eliquis was reduced to 2.5 mg bid, lopressor to 12.5 mg bid    As she was looking a bit pale we ordered a CBC    WBC 3.90 - 12.70 K/uL 6.53    RBC 4.00 - 5.40 M/uL 2.59 Abnormally low     Hemoglobin 12.0 - 16.0 g/dL 7.1 Abnormally low     Hematocrit 37.0 - 48.5 % 26.3 Abnormally low      Her H/H is much lower than when she was discharged.  She told me she is weak and her BP is still very low.  I advised her she probably needs to go to the ER for further GI evaluation, possible admission for transfusion and that she should stop the eliquis until it is determined she is does not have an active bleed.  She was wanting to to wait for OP GI visit but I advised her that was not a safe idea.  She indicated she understood.

## 2019-02-06 PROBLEM — I48.0 PAF (PAROXYSMAL ATRIAL FIBRILLATION): Status: ACTIVE | Noted: 2019-02-06

## 2019-02-08 ENCOUNTER — TELEPHONE (OUTPATIENT)
Dept: GASTROENTEROLOGY | Facility: CLINIC | Age: 84
End: 2019-02-08

## 2019-02-08 NOTE — TELEPHONE ENCOUNTER
----- Message from Margaret Herrera sent at 2/8/2019 11:15 AM CST -----  Contact: zita Mcfadden [phone] with [facility] is calling to ask when patient can resume Aspirin and Elaquis?/please advise

## 2019-02-08 NOTE — TELEPHONE ENCOUNTER
----- Message from Luz Marina Jacinto sent at 2/8/2019 12:59 PM CST -----  Contact: Lesa with [address]    Calling in regards to still  waiting on a call back for when can pt resume her Eliquis and aspirin and please advise [phone] and fax # [phone]

## 2019-02-12 ENCOUNTER — TELEPHONE (OUTPATIENT)
Dept: FAMILY MEDICINE | Facility: CLINIC | Age: 84
End: 2019-02-12

## 2019-02-12 NOTE — TELEPHONE ENCOUNTER
----- Message from Cristy Bacon sent at 2/12/2019  2:02 PM CST -----  Type: Needs Medical Advice    Who Called: Brooke with [facility] Best Call Back Number: [phone]  Additional Information: Stated resumed care for patient, she is requesting therapy evaluation and in home aid/please call back to advise.

## 2019-02-12 NOTE — TELEPHONE ENCOUNTER
Phoned Brooke in regards to message. Please advise as they have resumed care and are requesting per pt for therapy eval and a home aid. Thank you. CLC

## 2019-02-12 NOTE — TELEPHONE ENCOUNTER
Phoned Brooke with [facility] and gave verbal for therapy eval and home aid. Brooke voiced understanding for the verbal orders. CLC

## 2019-02-14 ENCOUNTER — OFFICE VISIT (OUTPATIENT)
Dept: FAMILY MEDICINE | Facility: CLINIC | Age: 84
End: 2019-02-14
Payer: MEDICARE

## 2019-02-14 VITALS
BODY MASS INDEX: 22.35 KG/M2 | OXYGEN SATURATION: 98 % | DIASTOLIC BLOOD PRESSURE: 84 MMHG | SYSTOLIC BLOOD PRESSURE: 128 MMHG | RESPIRATION RATE: 18 BRPM | HEIGHT: 61 IN | WEIGHT: 118.38 LBS | HEART RATE: 93 BPM

## 2019-02-14 DIAGNOSIS — D64.9 ANEMIA, UNSPECIFIED TYPE: ICD-10-CM

## 2019-02-14 DIAGNOSIS — K92.2 ACUTE LOWER GI BLEEDING: Primary | ICD-10-CM

## 2019-02-14 DIAGNOSIS — I25.10 CORONARY ARTERY DISEASE INVOLVING NATIVE CORONARY ARTERY OF NATIVE HEART WITHOUT ANGINA PECTORIS: ICD-10-CM

## 2019-02-14 DIAGNOSIS — G25.0 ESSENTIAL TREMOR: ICD-10-CM

## 2019-02-14 DIAGNOSIS — R25.1 TREMOR: ICD-10-CM

## 2019-02-14 DIAGNOSIS — I48.0 PAROXYSMAL ATRIAL FIBRILLATION: ICD-10-CM

## 2019-02-14 DIAGNOSIS — Z86.73 HISTORY OF CVA (CEREBROVASCULAR ACCIDENT): ICD-10-CM

## 2019-02-14 PROCEDURE — 99999 PR PBB SHADOW E&M-EST. PATIENT-LVL V: CPT | Mod: PBBFAC,,, | Performed by: FAMILY MEDICINE

## 2019-02-14 PROCEDURE — 99214 PR OFFICE/OUTPT VISIT, EST, LEVL IV, 30-39 MIN: ICD-10-PCS | Mod: S$PBB,,, | Performed by: FAMILY MEDICINE

## 2019-02-14 PROCEDURE — 99214 OFFICE O/P EST MOD 30 MIN: CPT | Mod: S$PBB,,, | Performed by: FAMILY MEDICINE

## 2019-02-14 PROCEDURE — 99215 OFFICE O/P EST HI 40 MIN: CPT | Mod: PBBFAC,PO | Performed by: FAMILY MEDICINE

## 2019-02-14 PROCEDURE — 99999 PR PBB SHADOW E&M-EST. PATIENT-LVL V: ICD-10-PCS | Mod: PBBFAC,,, | Performed by: FAMILY MEDICINE

## 2019-02-14 NOTE — PROGRESS NOTES
[facility] Nursing Home Visit    Subjective:       Patient ID: Martha Peres is a 86 y.o. female.    Here for  f/u on chronic health issues and recent hospitalization     HPI:     Admission Date: 2/5/2019  Hospital Length of Stay: 3 days  Discharge Date and Time:  02/09/2019 9:21 AM  Attending Physician: CORBY Bravo, *   Discharging Provider: MELBA Bravo MD  Primary Care Provider: Houston Sheppard MD  HPI:   Patient is an 86 years old lady with paroxysmal AFib on Eliquis/amiodarone, history of CVA with left-sided residual weakness since 2016, history of breast cancer status post radiation.CAD with stents years ago with recent stress negative for reversible ischemia with echo showing diastolic dysfunction maintained on aspirin.  Has had recent multiple admissions for symptomatic anemia with lower GI bleed, scopes initially they did not identify source of blood loss, endoscopy done 01/30/2019 suggested colonic AVM which was cauterized by Dr. Del Toro -patient was discharged home with a hemoglobin stable at 9.9-restarted aspirin and Eliquis.  Had a follow-up appointment with her cardiologist 3 days ago had lab workup done for fatigue and weakness and was called last night with a hemoglobin of 7 and recommended patient to go to the emergency room for evaluation.  On arrival here patient was hypotensive bradycardic, lab workup with hemoglobin of 7.7 microcytic with stool occult is being negative patient is typed and crossmatched and to receive blood transfusion delayed secondary to antibodies.  Patient stopped taking aspirin and Eliquis 2 days ago.  Hospital Medicine as to evaluate and admit patient history obtained from reviewing records and talking to patient.  Denies any nausea vomiting no abdominal pain and bowel movements are regular with no melena/BRBPR .  Patient denies chest pains or shortness of breath physical activity is limited because of weakness.  Procedure(s) (LRB):  COLONOSCOPY  (N/A)    Hospital Course:   Patient admitted with symptomatic anemia (while on ASA/eliquis) and GI bleed.  Patient underwent 2U PRBC transfusion and EGD. No source of bleeding found on EGD.  Colonoscopy reported diverticulosis, int hemorrhoids, but no real active bleed was noted. Patient was subsequently stepped down from the ICU. Initially some cardiac medications were adjusted due to low-normal BP while ongoing bleeding, however, BP normalized and BB/reg dose of amiodarone was resumed.  Pt was able to tolerate ordered diet, and no further bleeding episodes were noted.  Per GI instructions, ASA/eliquis was resumed on 02/09.  She was noted to have subclinical hypothyroidism.  PT was consulted, and it was recommended Home with .  Pt in addition, pt lives with her daughter, and son lives next door.  Pt was noted to be back her baseline functional status.  She will be discharge home on stable condition with .  She will be referred to [facility].     C/o some shakiness since stopping inderall. This was stopped due to low blood pressure.  Here today with sitter.  She has a 24 hour sitter.  Using wheelchair with assistance for ambulation  [facility] in place at home.        Hypertension   Pertinent negatives include no chest pain, headaches, neck pain, palpitations or shortness of breath.       Past Medical History:   Diagnosis Date    Anticoagulant long-term use     Arthritis     Benign essential tremor 2000    Bilateral    Breast cancer     left , no B/p or sticks to left    Cataract     ou done    Colon polyps     Coronary artery disease     2 stents     DDD (degenerative disc disease), lumbar     Encounter for blood transfusion     GERD (gastroesophageal reflux disease)     Glaucoma     left eye    Hemiparesis affecting left side as late effect of cerebrovascular accident     HLD (hyperlipidemia)     HTN (hypertension)     Hyperparathyroidism     Hypothyroidism     Mobility impaired     uses wheelchair     Neck pain     radiating to both shoulders and arms    Osteoporosis     PVD (peripheral vascular disease)     Stroke     August 2016       Past Surgical History:   Procedure Laterality Date    APPENDECTOMY      BLADDER SUSPENSION      Block-nerve-medial branch-lumbar L3, L4, L5 Bilateral 10/19/2018    [facility]    BREAST LUMPECTOMY      no bp/iv left arm    CARDIAC SURGERY      COLONOSCOPY      COLONOSCOPY N/A 2/7/2019    [facility]    COLONOSCOPY N/A 1/12/2019    [facility]    CORONARY ANGIOPLASTY WITH STENT PLACEMENT      x 2    EGD (ESOPHAGOGASTRODUODENOSCOPY) N/A 2/6/2019    [facility]    EGD (ESOPHAGOGASTRODUODENOSCOPY) N/A 7/2/2018    [facility]i Randolph [facility] at [facility]    Epidural steroid injection      Pain managment    EYE SURGERY      bilat phaco with iol    INJECTION, STEROID, SPINE, CERVICAL, EPIDURAL N/A 1/20/2014    [facility] OR    INJECTION, STEROID, SPINE, CERVICAL, EPIDURAL N/A 11/18/2013    [facility]    INJECTION, STEROID, SPINE, CERVICAL, EPIDURAL N/A 8/27/2013    [facility]    INJECTION, STEROID, SPINE, CERVICAL, EPIDURAL N/A 7/30/2013    [facility]    Injection,steroid,epidural,transforaminal approach L5/S1 Left 7/30/2018    [facility] at [facility] OR    INJECTION-STEROID-EPIDURAL-CAUDAL N/A 1/2/2018    [facility] OR    INJECTION-STEROID-EPIDURAL-CAUDAL N/A 10/28/2016    [facility] at [facility]    INJECTION-STEROID-EPIDURAL-CERVICAL N/A 7/8/2016    [facility] at [facility] OR    INJECTION-STEROID-EPIDURAL-CERVICAL N/A 12/22/2015    [facility] at  [facility]    INJECTION-STEROID-EPIDURAL-CERVICAL N/A 9/2/2015    [facility] at [facility] OR    INJECTION-STEROID-EPIDURAL-CERVICAL N/A 8/15/2014    [facility] at [facility] OR    INJECTION-STEROID-EPIDURAL-LUMBAR N/A 10/7/2015    [facility] at [facility] OR    RADIOFREQUENCY ABLATION, NERVE, SPINAL, LUMBAR, MEDIAL BRANCH, L3, L4, L5 Bilateral 11/9/2018    [facility] at [facility] OR    TONSILLECTOMY      TOTAL ABDOMINAL HYSTERECTOMY         Review of patient's allergies indicates:   Allergen Reactions    No known drug allergies        Social History     Socioeconomic History    Marital status:      Spouse name: Not on file    Number of children: Not on file    Years of education: Not on file    Highest education level: Not on file   Social Needs    Financial resource strain: Not on file    Food insecurity - worry: Not on file    Food insecurity - inability: Not on file    Transportation needs - medical: Not on file    Transportation needs - non-medical: Not on file   Occupational History    Not on file   Tobacco Use    Smoking status: Never Smoker    Smokeless tobacco: Never Used   Substance and Sexual Activity    Alcohol use: No    Drug use: No    Sexual activity: Not on file   Other Topics Concern    Not on file   Social History Narrative    Not on file       Current Outpatient Medications on File Prior to Visit   Medication Sig Dispense Refill    amiodarone (PACERONE) 200 MG Tab Take 1 tablet (200 mg total) by mouth 2 (two) times daily. 60 tablet 1    apixaban (ELIQUIS) 2.5 mg Tab Take 1 tablet (2.5 mg total) by mouth 2 (two) times daily. 60 tablet 1    aspirin (ECOTRIN) 81 MG EC tablet Take 1 tablet (81 mg total) by mouth every Mon, Wed, Fri.  0    atorvastatin (LIPITOR) 10 MG tablet TAKE ONE TABLET BY MOUTH EVERY evening 30 tablet 11    brimonidine 0.2% (ALPHAGAN) 0.2 % Drop Place 1 drop into both eyes  2 (two) times daily. 10 mL 1    calcium-vitamin D tablet 600 mg-200 units Take 1 tablet by mouth once daily.       docusate sodium (COLACE) 100 MG capsule Take 100 mg by mouth 2 (two) times daily as needed for Constipation.      dorzolamide (TRUSOPT) 2 % ophthalmic solution Place 1 drop into both eyes 2 (two) times daily. 10 mL 1    furosemide (LASIX) 20 MG tablet Take 1 tablet (20 mg total) by mouth once daily. 30 tablet 2    gabapentin (NEURONTIN) 300 MG capsule Take 1 capsule (300 mg total) by mouth 3 (three) times daily. 90 capsule 3    latanoprost 0.005 % ophthalmic solution Place 1 drop into both eyes every evening. 7.5 mL 1    levothyroxine (SYNTHROID) 50 MCG tablet TAKE ONE TABLET BY MOUTH BEFORE breakfast 30 tablet 11    lisinopril (PRINIVIL,ZESTRIL) 2.5 MG tablet Take 1 tablet (2.5 mg total) by mouth once daily. 30 tablet 11    metoprolol tartrate (LOPRESSOR) 25 MG tablet Take 1 tablet (25 mg total) by mouth 2 (two) times daily. 60 tablet 1    multivitamin-minerals-lutein Tab Take 1 tablet by mouth once daily.      RABEprazole (ACIPHEX) 20 mg tablet TAKE ONE TABLET BY MOUTH EVERY MORNING 30 tablet 11     No current facility-administered medications on file prior to visit.        Family History   Problem Relation Age of Onset    Essential Tremor Father     Stroke Father     Heart attack Father     Essential Tremor Sister     Stroke Sister     Essential Tremor Brother     Stroke Brother     Heart attack Brother     Stroke Mother     Heart attack Mother     Stroke Brother     Heart attack Brother     Stroke Brother     Heart attack Brother     Stroke Brother     Heart attack Brother     Stroke Brother     Heart attack Brother     Stroke Sister     Heart disease Sister     Stroke Sister     Heart attack Sister     Stroke Sister     Heart attack Sister     Essential Tremor Daughter     Essential Tremor Son     Anesthesia problems Neg Hx     Clotting disorder Neg Hx      "Amblyopia Neg Hx     Blindness Neg Hx     Cancer Neg Hx     Cataracts Neg Hx     Diabetes Neg Hx     Glaucoma Neg Hx     Hypertension Neg Hx     Macular degeneration Neg Hx     Retinal detachment Neg Hx     Strabismus Neg Hx        Review of Systems   Constitutional: Negative for appetite change, chills, fever and unexpected weight change.   HENT: Negative for sore throat and trouble swallowing.    Eyes: Negative for pain and visual disturbance.   Respiratory: Negative for cough, shortness of breath and wheezing.    Cardiovascular: Negative for chest pain and palpitations.   Gastrointestinal: Negative for abdominal pain, blood in stool, diarrhea, nausea and vomiting.   Genitourinary: Negative for difficulty urinating, dysuria and hematuria.   Musculoskeletal: Positive for arthralgias, back pain and gait problem. Negative for neck pain.   Skin: Negative for rash and wound.   Neurological: Positive for weakness (left arm and leg). Negative for dizziness, numbness and headaches.   Hematological: Negative for adenopathy.   Psychiatric/Behavioral: Negative for dysphoric mood.       Objective:      /84   Pulse 93   Resp 18   Ht 5' 1" (1.549 m)   Wt 53.7 kg (118 lb 6.2 oz)   SpO2 98%   BMI 22.37 kg/m²     Physical Exam   Constitutional: She is oriented to person, place, and time. She appears well-developed and well-nourished.   HENT:   Head: Normocephalic.   Mouth/Throat: Oropharynx is clear and moist. No oropharyngeal exudate or posterior oropharyngeal erythema.   Eyes: Conjunctivae and EOM are normal. Pupils are equal, round, and reactive to light.   Neck: Normal range of motion. Neck supple. No thyromegaly present.   Cardiovascular: Normal rate, regular rhythm, S1 normal, S2 normal, normal heart sounds and intact distal pulses. Exam reveals no gallop and no friction rub.   No murmur heard.  Pulmonary/Chest: Effort normal and breath sounds normal. She has no wheezes. She has no rales.   Abdominal: " Normal appearance.   Musculoskeletal:        Right lower leg: She exhibits no edema.        Left lower leg: She exhibits no edema.   Lymphadenopathy:     She has no cervical adenopathy.   Neurological: She is alert and oriented to person, place, and time. She displays tremor. No cranial nerve deficit. Coordination and gait abnormal.   Left arm and leg wekaness   Skin: Skin is warm and intact. No rash noted.   Psychiatric: She has a normal mood and affect.       Results for orders placed or performed during the hospital encounter of 02/05/19   CBC auto differential   Result Value Ref Range    WBC 5.50 3.90 - 12.70 K/uL    RBC 2.71 (L) 4.00 - 5.40 M/uL    Hemoglobin 7.6 (L) 12.0 - 16.0 g/dL    Hematocrit 25.9 (L) 37.0 - 48.5 %    MCV 96 82 - 98 fL    MCH 28.0 27.0 - 31.0 pg    MCHC 29.3 (L) 32.0 - 36.0 g/dL    RDW 17.3 (H) 11.5 - 14.5 %    Platelets 355 (H) 150 - 350 K/uL    MPV 10.5 9.2 - 12.9 fL    Gran # (ANC) 4.2 1.8 - 7.7 K/uL    Lymph # 0.7 (L) 1.0 - 4.8 K/uL    Mono # 0.4 0.3 - 1.0 K/uL    Eos # 0.1 0.0 - 0.5 K/uL    Baso # 0.04 0.00 - 0.20 K/uL    nRBC 1 (A) 0 /100 WBC    Gran% 76.5 (H) 38.0 - 73.0 %    Lymph% 13.3 (L) 18.0 - 48.0 %    Mono% 7.3 4.0 - 15.0 %    Eosinophil% 2.2 0.0 - 8.0 %    Basophil% 0.7 0.0 - 1.9 %    Differential Method Automated    Comprehensive metabolic panel (CMP)   Result Value Ref Range    Sodium 143 136 - 145 mmol/L    Potassium 3.7 3.5 - 5.1 mmol/L    Chloride 112 (H) 95 - 110 mmol/L    CO2 21 (L) 22 - 31 mmol/L    Glucose 86 70 - 110 mg/dL    BUN, Bld 26 (H) 7 - 18 mg/dL    Creatinine 0.73 0.50 - 1.40 mg/dL    Calcium 10.3 (H) 8.4 - 10.2 mg/dL    Total Protein 6.6 6.0 - 8.4 g/dL    Albumin 3.8 3.5 - 5.2 g/dL    Total Bilirubin 0.4 0.2 - 1.3 mg/dL    Alkaline Phosphatase 106 38 - 145 U/L    AST 25 14 - 36 U/L    ALT 20 10 - 44 U/L    Anion Gap 10 8 - 16 mmol/L    eGFR if African American >60 >60 mL/min/1.73 m^2    eGFR if non African American >60 >60 mL/min/1.73 m^2   Occult blood x  1, stool   Result Value Ref Range    Occult Blood Negative Negative   Troponin I   Result Value Ref Range    Troponin I <0.012 0.012 - 0.034 ng/mL   CBC auto differential   Result Value Ref Range    WBC 4.65 3.90 - 12.70 K/uL    RBC 2.39 (L) 4.00 - 5.40 M/uL    Hemoglobin 6.5 (L) 12.0 - 16.0 g/dL    Hematocrit 22.6 (L) 37.0 - 48.5 %    MCV 95 82 - 98 fL    MCH 27.2 27.0 - 31.0 pg    MCHC 28.8 (L) 32.0 - 36.0 g/dL    RDW 17.1 (H) 11.5 - 14.5 %    Platelets 320 150 - 350 K/uL    MPV 10.4 9.2 - 12.9 fL    Gran # (ANC) 3.2 1.8 - 7.7 K/uL    Lymph # 0.9 (L) 1.0 - 4.8 K/uL    Mono # 0.4 0.3 - 1.0 K/uL    Eos # 0.1 0.0 - 0.5 K/uL    Baso # 0.04 0.00 - 0.20 K/uL    nRBC 0 0 /100 WBC    Gran% 69.6 38.0 - 73.0 %    Lymph% 18.3 18.0 - 48.0 %    Mono% 8.2 4.0 - 15.0 %    Eosinophil% 3.0 0.0 - 8.0 %    Basophil% 0.9 0.0 - 1.9 %    Aniso Slight     Poly Occasional     Hypo Moderate     Differential Method Automated    Basic metabolic panel   Result Value Ref Range    Sodium 140 136 - 145 mmol/L    Potassium 3.4 (L) 3.5 - 5.1 mmol/L    Chloride 111 (H) 95 - 110 mmol/L    CO2 21 (L) 22 - 31 mmol/L    Glucose 68 (L) 70 - 110 mg/dL    BUN, Bld 23 (H) 7 - 18 mg/dL    Creatinine 0.59 0.50 - 1.40 mg/dL    Calcium 9.6 8.4 - 10.2 mg/dL    Anion Gap 8 8 - 16 mmol/L    eGFR if African American >60 >60 mL/min/1.73 m^2    eGFR if non African American >60 >60 mL/min/1.73 m^2   TSH   Result Value Ref Range    TSH 6.630 (H) 0.400 - 4.000 uIU/mL   Troponin I   Result Value Ref Range    Troponin I <0.012 0.012 - 0.034 ng/mL   Reticulocytes   Result Value Ref Range    Retic 3.9 (H) 0.5 - 2.5 %   Iron and TIBC   Result Value Ref Range    Iron 24 (L) 30 - 160 ug/dL    TIBC 310 265 - 497 ug/dL    Iron Saturation 8 (L) 20 - 50 %   Ferritin   Result Value Ref Range    Ferritin 32 12 - 264 ng/mL   Troponin I   Result Value Ref Range    Troponin I <0.012 0.012 - 0.034 ng/mL   Hemoglobin   Result Value Ref Range    Hemoglobin 7.1 (L) 12.0 - 16.0 g/dL    Hematocrit   Result Value Ref Range    Hematocrit 25.2 (L) 37.0 - 48.5 %   Hemoglobin   Result Value Ref Range    Hemoglobin 11.5 (L) 12.0 - 16.0 g/dL   Hematocrit   Result Value Ref Range    Hematocrit 37.4 37.0 - 48.5 %   CBC auto differential   Result Value Ref Range    WBC 6.53 3.90 - 12.70 K/uL    RBC 3.72 (L) 4.00 - 5.40 M/uL    Hemoglobin 10.4 (L) 12.0 - 16.0 g/dL    Hematocrit 33.0 (L) 37.0 - 48.5 %    MCV 89 82 - 98 fL    MCH 28.0 27.0 - 31.0 pg    MCHC 31.5 (L) 32.0 - 36.0 g/dL    RDW 17.1 (H) 11.5 - 14.5 %    Platelets 239 150 - 350 K/uL    MPV 10.5 9.2 - 12.9 fL    Gran # (ANC) 5.7 1.8 - 7.7 K/uL    Lymph # 0.4 (L) 1.0 - 4.8 K/uL    Mono # 0.4 0.3 - 1.0 K/uL    Eos # 0.0 0.0 - 0.5 K/uL    Baso # 0.04 0.00 - 0.20 K/uL    nRBC 1 (A) 0 /100 WBC    Gran% 87.4 (H) 38.0 - 73.0 %    Lymph% 5.7 (L) 18.0 - 48.0 %    Mono% 6.1 4.0 - 15.0 %    Eosinophil% 0.2 0.0 - 8.0 %    Basophil% 0.6 0.0 - 1.9 %    Aniso Slight     Poik Slight     Poly Occasional     Hypo Moderate     Ovalocytes Occasional     Large/Giant Platelets Present     Differential Method Automated    Comprehensive metabolic panel   Result Value Ref Range    Sodium 142 136 - 145 mmol/L    Potassium 4.2 3.5 - 5.1 mmol/L    Chloride 112 (H) 95 - 110 mmol/L    CO2 20 (L) 22 - 31 mmol/L    Glucose 53 (L) 70 - 110 mg/dL    BUN, Bld 14 7 - 18 mg/dL    Creatinine 0.48 (L) 0.50 - 1.40 mg/dL    Calcium 9.9 8.4 - 10.2 mg/dL    Total Protein 5.9 (L) 6.0 - 8.4 g/dL    Albumin 3.3 (L) 3.5 - 5.2 g/dL    Total Bilirubin 0.8 0.2 - 1.3 mg/dL    Alkaline Phosphatase 104 38 - 145 U/L    AST 26 14 - 36 U/L    ALT 25 10 - 44 U/L    Anion Gap 10 8 - 16 mmol/L    eGFR if African American >60 >60 mL/min/1.73 m^2    eGFR if non African American >60 >60 mL/min/1.73 m^2   Hemoglobin   Result Value Ref Range    Hemoglobin 11.6 (L) 12.0 - 16.0 g/dL   Hematocrit   Result Value Ref Range    Hematocrit 37.4 37.0 - 48.5 %   Hematocrit   Result Value Ref Range    Hematocrit 33.8 (L)  37.0 - 48.5 %   Hematocrit   Result Value Ref Range    Hematocrit 38.0 37.0 - 48.5 %   T4, free   Result Value Ref Range    Free T4 2.13 0.78 - 2.19 ng/dL   CBC auto differential   Result Value Ref Range    WBC 5.93 3.90 - 12.70 K/uL    RBC 4.30 4.00 - 5.40 M/uL    Hemoglobin 12.1 12.0 - 16.0 g/dL    Hematocrit 37.1 37.0 - 48.5 %    MCV 86 82 - 98 fL    MCH 28.1 27.0 - 31.0 pg    MCHC 32.6 32.0 - 36.0 g/dL    RDW 16.8 (H) 11.5 - 14.5 %    Platelets 289 150 - 350 K/uL    MPV 10.2 9.2 - 12.9 fL    Gran # (ANC) 4.6 1.8 - 7.7 K/uL    Lymph # 0.7 (L) 1.0 - 4.8 K/uL    Mono # 0.5 0.3 - 1.0 K/uL    Eos # 0.1 0.0 - 0.5 K/uL    Baso # 0.04 0.00 - 0.20 K/uL    nRBC 1 (A) 0 /100 WBC    Gran% 77.1 (H) 38.0 - 73.0 %    Lymph% 11.3 (L) 18.0 - 48.0 %    Mono% 8.9 4.0 - 15.0 %    Eosinophil% 2.0 0.0 - 8.0 %    Basophil% 0.7 0.0 - 1.9 %    Differential Method Automated    Type & Screen   Result Value Ref Range    Group & Rh A POS     Indirect Mathew GEL POS    Antibody identification   Result Value Ref Range    Antibody Identification POS    POCT glucose   Result Value Ref Range    POCT Glucose 49 (LL) 70 - 110 mg/dL   POCT glucose   Result Value Ref Range    POCT Glucose 46 (LL) 70 - 110 mg/dL   POCT glucose   Result Value Ref Range    POCT Glucose 94 70 - 110 mg/dL   POCT glucose   Result Value Ref Range    POCT Glucose 96 70 - 110 mg/dL   POCT glucose   Result Value Ref Range    POCT Glucose 126 (H) 70 - 110 mg/dL   POCT glucose   Result Value Ref Range    POCT Glucose 68 (L) 70 - 110 mg/dL   POCT glucose   Result Value Ref Range    POCT Glucose 82 70 - 110 mg/dL   POCT glucose   Result Value Ref Range    POCT Glucose 91 70 - 110 mg/dL   POCT glucose   Result Value Ref Range    POCT Glucose 194 (H) 70 - 110 mg/dL   POCT glucose   Result Value Ref Range    POCT Glucose 72 70 - 110 mg/dL   POCT glucose   Result Value Ref Range    POCT Glucose 114 (H) 70 - 110 mg/dL   POCT glucose   Result Value Ref Range    POCT Glucose 66 (L) 70 -  110 mg/dL   POCT glucose   Result Value Ref Range    POCT Glucose 132 (H) 70 - 110 mg/dL   POCT glucose   Result Value Ref Range    POCT Glucose 142 (H) 70 - 110 mg/dL   Prepare RBC   Result Value Ref Range    UNIT NUMBER [id number]     PRODUCT CODE X2884O45     DISPENSE STATUS TRANSFUSED     CODING SYSTEM JMLK046     Unit Blood Type Code 6200     Unit Blood Type A POS     Unit Expiration [id number]     UNIT NUMBER [id number]     PRODUCT CODE P1724Q65     DISPENSE STATUS TRANSFUSED     CODING SYSTEM QXMY738     Unit Blood Type Code 6200     Unit Blood Type A POS     Unit Expiration [id number]      *Note: Due to a large number of results and/or encounters for the requested time period, some results have not been displayed. A complete set of results can be found in Results Review.     Hospital records reviewed    Assessment:       1. Acute lower GI bleeding    2. Tremor    3. Anemia, unspecified type    4. Coronary artery disease involving native coronary artery of native heart without angina pectoris    5. Essential tremor    6. History of CVA (cerebrovascular accident)    7. Paroxysmal atrial fibrillation        Plan:       Acute lower GI bleeding  -     CBC auto differential; Future; Expected date: 03/01/2019    Tremor  -     Ambulatory referral to Neurology    Anemia, unspecified type  -     CBC auto differential; Future; Expected date: 03/01/2019    Coronary artery disease involving native coronary artery of native heart without angina pectoris    Essential tremor    History of CVA (cerebrovascular accident)    Paroxysmal atrial fibrillation        Overall stable  Continue present meds  F/u with cardiology as planned; CBC on 3/1  Continue other present medications and specialty follow-up  F/u 3 months

## 2019-02-18 ENCOUNTER — TELEPHONE (OUTPATIENT)
Dept: PAIN MEDICINE | Facility: CLINIC | Age: 84
End: 2019-02-18

## 2019-02-18 ENCOUNTER — TELEPHONE (OUTPATIENT)
Dept: FAMILY MEDICINE | Facility: CLINIC | Age: 84
End: 2019-02-18

## 2019-02-18 RX ORDER — HYDROCODONE BITARTRATE AND ACETAMINOPHEN 10; 325 MG/1; MG/1
1 TABLET ORAL 2 TIMES DAILY PRN
Qty: 60 TABLET | Refills: 0 | Status: ON HOLD | OUTPATIENT
Start: 2019-02-18 | End: 2019-03-19 | Stop reason: HOSPADM

## 2019-02-18 NOTE — TELEPHONE ENCOUNTER
----- Message from Jose Lewis sent at 2/18/2019  1:39 PM CST -----  Contact: Brooke/Physical Therapist with [facility]  Brooke called in regarding the attached patient.  Brooke stated that when caregiver was transporting patient to car on Friday 2/15/19.  Patient denied falling but patient felt pain in left  shoulder.  Patient was brought to ER at [facility] about 5pm on 2/15/19 & shoulder was x-rayed & it was discovered that it as broken .      Brooke stated patient does have a scheduled appt with Dr. Lewis for look at shoulder.  Brooke's call back number is [phone]

## 2019-02-18 NOTE — TELEPHONE ENCOUNTER
----- Message from Traci Coughlinlobo sent at 2/18/2019  8:06 AM CST -----  Contact: Self  Type:  RX Refill Request    Who Called:  patient  Refill or New Rx: refill  RX Name and Strength:  HYDROcodone-acetaminophen (NORCO)  mg per tablet  How is the patient currently taking it? (ex. 1XDay):  4XDay  Is this a 30 day or 90 day RX:  30  Preferred Pharmacy with phone number:    [address] [address]  Phone: [phone] Fax: [phone]  Local or Mail Order: local  Ordering Provider:  Dr Murali Aleman Call Back Number:  [phone] (home)   Additional Information: Patient had her left arm broken last Friday, went to [facility] ER 2/15, and she is having to take a few more than normal

## 2019-02-20 ENCOUNTER — DOCUMENTATION ONLY (OUTPATIENT)
Dept: FAMILY MEDICINE | Facility: CLINIC | Age: 84
End: 2019-02-20

## 2019-02-20 NOTE — PROGRESS NOTES
PA initiated for Rabeprazole 20mg tablets.  CMM: [id number] - [id number]  [facility]  --------------------------------------------------------  APPROVED  Valid: 2/21/19 - 2/20/2020

## 2019-03-01 ENCOUNTER — TELEPHONE (OUTPATIENT)
Dept: PAIN MEDICINE | Facility: CLINIC | Age: 84
End: 2019-03-01

## 2019-03-01 RX ORDER — HYDROCODONE BITARTRATE AND ACETAMINOPHEN 10; 325 MG/1; MG/1
1 TABLET ORAL EVERY 6 HOURS PRN
Qty: 60 TABLET | Refills: 0 | Status: SHIPPED | OUTPATIENT
Start: 2019-03-01 | End: 2019-04-01 | Stop reason: SDUPTHER

## 2019-03-01 NOTE — TELEPHONE ENCOUNTER
Patient states that she filled the last prescription for hydrocodone that we sent her but she is taking the pain medication 4 times a day since she broke her arm. Please advise. She needs a refill

## 2019-03-01 NOTE — TELEPHONE ENCOUNTER
----- Message from Margaret Herrera sent at 3/1/2019  7:36 AM CST -----  Contact: self  Patient [phone] is calling/requesting a refill on Hydrocodone 10/325mg/please advise patient when sent to pharmacy    [facility] [id number] [place] [id number]  Phone: [phone] Fax: [phone]

## 2019-03-07 RX ORDER — AMIODARONE HYDROCHLORIDE 200 MG/1
TABLET ORAL
Qty: 60 TABLET | Refills: 1 | Status: SHIPPED | OUTPATIENT
Start: 2019-03-07

## 2019-03-12 ENCOUNTER — TELEPHONE (OUTPATIENT)
Dept: FAMILY MEDICINE | Facility: CLINIC | Age: 84
End: 2019-03-12

## 2019-03-12 DIAGNOSIS — R53.1 WEAKNESS: Primary | ICD-10-CM

## 2019-03-12 NOTE — TELEPHONE ENCOUNTER
Spoke to  nurse. Patient's daughter was instructed bring patient to the ER due to increased weakness, blood in stool, and increased fatigue. She does not want to take her to the ER because she does not want to put her at risk for the flu. She would like to know if Dr. Sheppard can a CBC to check her blood counts. Please advise.

## 2019-03-12 NOTE — TELEPHONE ENCOUNTER
----- Message from Cassia Jacinto sent at 3/12/2019  3:30 PM CDT -----  Pt's daughter called [facility]  / she has increased fatigue  / weakness  / blood in stool / advised to go to er / but doesn't want to / please call nurse Gema  [phone] ... Daughter is requesting to have labs drawn by the nurse going out tomorrow

## 2019-03-13 ENCOUNTER — TELEPHONE (OUTPATIENT)
Dept: CARDIOLOGY | Facility: CLINIC | Age: 84
End: 2019-03-13

## 2019-03-13 ENCOUNTER — LAB VISIT (OUTPATIENT)
Dept: LAB | Facility: HOSPITAL | Age: 84
End: 2019-03-13
Attending: FAMILY MEDICINE
Payer: MEDICARE

## 2019-03-13 DIAGNOSIS — I50.32 CHRONIC DIASTOLIC HEART FAILURE: Primary | ICD-10-CM

## 2019-03-13 LAB
ALBUMIN SERPL BCP-MCNC: 4.7 G/DL
ALP SERPL-CCNC: 163 U/L
ALT SERPL W/O P-5'-P-CCNC: 23 U/L
ANION GAP SERPL CALC-SCNC: 15 MMOL/L
AST SERPL-CCNC: 28 U/L
BILIRUB SERPL-MCNC: 0.8 MG/DL
BUN SERPL-MCNC: 17 MG/DL
CALCIUM SERPL-MCNC: 11.5 MG/DL
CHLORIDE SERPL-SCNC: 108 MMOL/L
CO2 SERPL-SCNC: 23 MMOL/L
CREAT SERPL-MCNC: 0.9 MG/DL
ERYTHROCYTE [DISTWIDTH] IN BLOOD BY AUTOMATED COUNT: 20.2 %
EST. GFR  (AFRICAN AMERICAN): >60 ML/MIN/1.73 M^2
EST. GFR  (NON AFRICAN AMERICAN): 58 ML/MIN/1.73 M^2
GLUCOSE SERPL-MCNC: 88 MG/DL
HCT VFR BLD AUTO: 47.3 %
HGB BLD-MCNC: 14.3 G/DL
MCH RBC QN AUTO: 27 PG
MCHC RBC AUTO-ENTMCNC: 30.2 G/DL
MCV RBC AUTO: 89 FL
PLATELET # BLD AUTO: 291 K/UL
PMV BLD AUTO: 11.3 FL
POTASSIUM SERPL-SCNC: 3.6 MMOL/L
PROT SERPL-MCNC: 8.3 G/DL
RBC # BLD AUTO: 5.29 M/UL
SODIUM SERPL-SCNC: 146 MMOL/L
WBC # BLD AUTO: 6.68 K/UL

## 2019-03-13 PROCEDURE — 85027 COMPLETE CBC AUTOMATED: CPT | Mod: PO

## 2019-03-13 PROCEDURE — 80053 COMPREHEN METABOLIC PANEL: CPT | Mod: PO

## 2019-03-13 NOTE — TELEPHONE ENCOUNTER
Called patient left message  Spoke with home health nurse patient is not taking her lasix.  She has 3+plus edema BLE with extreme weakness noted. Patient's daughter does not give her lasix because it's hard to get up and use the restroom. Advised to contact pcp.

## 2019-03-13 NOTE — TELEPHONE ENCOUNTER
----- Message from Dory Gagnon sent at 3/13/2019  2:40 PM CDT -----  Contact: Shira with [facility]  The patient has 3 plus pitting edema to bilateral lower extremities and not taking her Lasix.   Call Back#[phone]  Thanks

## 2019-03-16 PROBLEM — K62.5 RECTAL BLEEDING: Status: ACTIVE | Noted: 2019-03-16

## 2019-03-16 PROBLEM — T68.XXXA HYPOTHERMIA: Status: ACTIVE | Noted: 2019-03-16

## 2019-03-16 PROBLEM — K92.2 GIB (GASTROINTESTINAL BLEEDING): Status: ACTIVE | Noted: 2019-03-16

## 2019-03-20 ENCOUNTER — TELEPHONE (OUTPATIENT)
Dept: FAMILY MEDICINE | Facility: CLINIC | Age: 84
End: 2019-03-20

## 2019-03-20 NOTE — TELEPHONE ENCOUNTER
----- Message from Sahra Stovall sent at 3/20/2019 11:19 AM CDT -----    Huy  With  [facility] calling  About    Reschedule  For  Tomorrow    With  Pt  At  Caregiver  Request // call [phone]

## 2019-03-20 NOTE — TELEPHONE ENCOUNTER
Spoke to Huy. She says patient was not feeling well today so she wanted to reschedule her home health visit for today for tomorrow. HUNTER.

## 2019-03-21 ENCOUNTER — TELEPHONE (OUTPATIENT)
Dept: FAMILY MEDICINE | Facility: CLINIC | Age: 84
End: 2019-03-21

## 2019-03-21 DIAGNOSIS — I69.354 HEMIPARESIS AFFECTING LEFT SIDE AS LATE EFFECT OF CEREBROVASCULAR ACCIDENT: ICD-10-CM

## 2019-03-21 DIAGNOSIS — Z86.73 HISTORY OF CVA (CEREBROVASCULAR ACCIDENT): ICD-10-CM

## 2019-03-21 DIAGNOSIS — M54.16 LUMBAR RADICULOPATHY: ICD-10-CM

## 2019-03-21 DIAGNOSIS — I25.10 CORONARY ARTERY DISEASE INVOLVING NATIVE CORONARY ARTERY OF NATIVE HEART WITHOUT ANGINA PECTORIS: ICD-10-CM

## 2019-03-21 DIAGNOSIS — G25.0 ESSENTIAL TREMOR: Primary | ICD-10-CM

## 2019-03-21 DIAGNOSIS — E03.4 HYPOTHYROIDISM DUE TO ACQUIRED ATROPHY OF THYROID: ICD-10-CM

## 2019-03-21 DIAGNOSIS — I48.0 PAROXYSMAL ATRIAL FIBRILLATION: ICD-10-CM

## 2019-03-21 DIAGNOSIS — D64.9 SYMPTOMATIC ANEMIA: ICD-10-CM

## 2019-03-21 RX ORDER — ONDANSETRON 4 MG/1
4 TABLET, FILM COATED ORAL EVERY 8 HOURS PRN
Qty: 15 TABLET | Refills: 0 | Status: SHIPPED | OUTPATIENT
Start: 2019-03-21

## 2019-03-21 NOTE — TELEPHONE ENCOUNTER
----- Message from Lisa Wahl sent at 3/21/2019  9:30 AM CDT -----  Type: Needs Medical Advice    Who Called:  Self   Symptoms (please be specific):  Nauseated   How long has patient had these symptoms:  Pharmacy name and phone #:  [facility]  Best Call Back Number: [phone]  Additional Information: Patient is nauseated every morning,requesting new rx phenergan.

## 2019-03-21 NOTE — TELEPHONE ENCOUNTER
Pt states that she is feeling nauseous and is requesting a new rx for phenergan. Pharm verified.Please advise.

## 2019-03-22 ENCOUNTER — OUTPATIENT CASE MANAGEMENT (OUTPATIENT)
Dept: ADMINISTRATIVE | Facility: OTHER | Age: 84
End: 2019-03-22

## 2019-03-22 ENCOUNTER — TELEPHONE (OUTPATIENT)
Dept: FAMILY MEDICINE | Facility: CLINIC | Age: 84
End: 2019-03-22

## 2019-03-22 NOTE — TELEPHONE ENCOUNTER
Spoke to Huy, patient's home health nurse. She says patient's daughter says that patient cannot tolerate Zofran it over sedates her and she would like to get a prescription of Phenergan. She says patient is still nauseous but no vomiting and she is eating well. Spoke to patient's daughter to schedule appt for hospital follow-up. Patient's daughter was very reluctant scheduling appt because patient's son bring her to appt. Assured patient's daughter that we can work around his schedule if he needs to change appt. She verbalized understanding. Put patient on schedule to see Dr. Sheppard on 3/29.

## 2019-03-22 NOTE — TELEPHONE ENCOUNTER
she needs an appt for eval of continued nausea   She was just in the hospital for a GI bleed - she needs to be seen for FU especially with nausea   Make an appt with dr Sheppard [facility]  Earlier than 5-14 that she already has booked   Is she taking her aciphex  I sent her zofran , phenergan is sedating for her age

## 2019-03-22 NOTE — PROGRESS NOTES
03/22/19-Called and spoke to daughter, Neda who spoke to the patient and she declines [facility] at this time. Read note from SHERRY Vazquez on 03/21/19 to daughter who told the patient.  Encouraged patient/daughter to call clinic since they are trying to reach her, Verbalized understanding. Will mail my contact information and [facility] brochure to patient.

## 2019-03-22 NOTE — TELEPHONE ENCOUNTER
----- Message from Huy Benoit sent at 3/22/2019  3:31 PM CDT -----  Type: Needs Medical Advice    Who Called:  Huy/[facility]  Best Call Back Number: [phone]    Additional Information: Zofran over sedates her - Phenergan would be preferred - please call to advise.

## 2019-03-23 NOTE — TELEPHONE ENCOUNTER
Discussed case with Dr Sheppard   Needs to be seen for hospital follow up  Scheduled for next week  She will need to use zofran for nausea as this should not cause nausea

## 2019-03-26 RX ORDER — METOPROLOL TARTRATE 25 MG/1
TABLET, FILM COATED ORAL
Qty: 60 TABLET | Refills: 5 | Status: SHIPPED | OUTPATIENT
Start: 2019-03-26

## 2019-04-01 RX ORDER — HYDROCODONE BITARTRATE AND ACETAMINOPHEN 10; 325 MG/1; MG/1
1 TABLET ORAL EVERY 6 HOURS PRN
Qty: 60 TABLET | Refills: 0 | Status: SHIPPED | OUTPATIENT
Start: 2019-04-01 | End: 2019-04-15 | Stop reason: SDUPTHER

## 2019-04-10 NOTE — TELEPHONE ENCOUNTER
Please review medications  - recent admission with GI bleed - on asa and eliquis - seems that Plavix was stopped on last admission  Was supposed to FU for fU but was not able to come

## 2019-04-11 RX ORDER — GABAPENTIN 300 MG/1
300 CAPSULE ORAL 3 TIMES DAILY
Qty: 90 CAPSULE | Refills: 3 | Status: SHIPPED | OUTPATIENT
Start: 2019-04-11

## 2019-04-11 RX ORDER — APIXABAN 5 MG/1
TABLET, FILM COATED ORAL
Qty: 60 TABLET | Refills: 5 | Status: ON HOLD | OUTPATIENT
Start: 2019-04-11 | End: 2019-04-15 | Stop reason: SDUPTHER

## 2019-04-12 PROBLEM — I50.9 ACUTE ON CHRONIC CONGESTIVE HEART FAILURE: Status: ACTIVE | Noted: 2019-04-12

## 2019-04-13 PROBLEM — E44.1 MILD MALNUTRITION: Status: ACTIVE | Noted: 2019-04-13

## 2019-04-15 RX ORDER — HYDROCODONE BITARTRATE AND ACETAMINOPHEN 10; 325 MG/1; MG/1
1 TABLET ORAL EVERY 6 HOURS PRN
Qty: 120 TABLET | Refills: 0 | Status: SHIPPED | OUTPATIENT
Start: 2019-04-15

## 2019-04-15 NOTE — TELEPHONE ENCOUNTER
----- Message from Aby Dan sent at 4/15/2019  3:16 PM CDT -----  Contact: pt  Type:  RX Refill Request    Who Called: pt   Refill or New Rx:refill  RX Name and Strength:hydocodone 10/325  How is the patient currently taking it? (ex. 1XDay):3 or 4XDay  Is this a 30 day or 90 day RX:30  Preferred Pharmacy with phone number:[facility] [address] [address] [address]  Phone: [phone] Fax: [phone]  Local or Mail Order:local  Ordering Provider:Dr Carrion  Would the patient rather a call back or a response via [facility]? Call back  Best Call Back Number:[phone]  Additional Information: .    Thank you

## 2019-04-15 NOTE — TELEPHONE ENCOUNTER
----- Message from Lottie Crystal sent at 4/15/2019 12:04 PM CDT -----  Contact: self   Patient need a refill on Hydrocodone  mg 30 day supply please send to [facility], any questions please call back at [phone] (home)     [facility]- [address] [address]  Phone: [phone] Fax: [phone]

## 2019-04-17 ENCOUNTER — TELEPHONE (OUTPATIENT)
Dept: FAMILY MEDICINE | Facility: CLINIC | Age: 84
End: 2019-04-17

## 2019-04-17 NOTE — TELEPHONE ENCOUNTER
----- Message from Elvis Wilder sent at 4/17/2019  1:54 PM CDT -----  Contact: Berna  Type: Needs Medical Advice    Who Called:  Berna with [facility] [place] [facility]  Symptoms (please be specific):    How long has patient had these symptoms:    Pharmacy name and phone #:    Best Call Back Number: [phone]  Additional Information: called regarding recertifying patient's status,goal and possible hospice

## 2019-04-17 NOTE — TELEPHONE ENCOUNTER
Spoke to Berna from [facility]. She says patient just got out of the hospital. She went back to see the patient for re-certification but she says at this point they do not have any attainable goals for patient. She says patient is not doing anything anymore. She has been asleep since yesterday. Family does not like to discuss end of life care. She says she will put the patient on their schedule for a couple of weeks and see about palliative care and a  to see if there is anything they can help with.   Patient's blood pressure was 94/56 today. She says while patient was in the hospital her blood pressure stayed in the 90s over 50s. She wants to know if you wanted her to discontinue the metoprolol. Please advise.

## 2019-04-18 NOTE — TELEPHONE ENCOUNTER
Phoned Berna with HH in regards to messages. Berna voiced understanding for instructions per Dr Sheppard. CLC

## 2019-04-22 ENCOUNTER — TELEPHONE (OUTPATIENT)
Dept: FAMILY MEDICINE | Facility: CLINIC | Age: 84
End: 2019-04-22

## 2019-04-22 NOTE — TELEPHONE ENCOUNTER
----- Message from Luiza Ashton sent at 2019  9:36 AM CDT -----  Contact: Shannan WONG [place]  [place] ph#[phone]  Shannan WONG [place]  [place] ph#[phone]  See death certificate in [facility]

## 2019-05-01 ENCOUNTER — PATIENT OUTREACH (OUTPATIENT)
Dept: ADMINISTRATIVE | Facility: HOSPITAL | Age: 84
End: 2019-05-01

## 2019-10-07 NOTE — TELEPHONE ENCOUNTER
Please tell her to try taking 2 tablets at once to see if it helps since she was just given a prescription for this.  If it is helping I can send in a new prescription for her.   Authored by Resident/PA/NP

## 2020-08-17 NOTE — TELEPHONE ENCOUNTER
----- Message from Jose Lewis sent at 7/18/2017 11:43 AM CDT -----  Contact: Shelby/[facility]  Shelby called in regarding the attached patient.  Shelby stated patients  b/p 162/108 around 7:30am and pain level of 8 in her back and legs.    Shelby's call back number is [phone]   
Ok to take 2 Tramadol at a time if needed.  Continue present BP meds.  She typically runs low here in the office and will see in follow-up as planned.  
Patient advised, voices understanding.    
Per home health nurse- patient states BP early this morning was 162/108, nurse just left patient and /68, pt had taken her lisinopril shortly after the higher reading.  Also reports tramadol not working for patient's pain, reports 8/10 to back and legs.  Hospital follow up scheduled next week on 7/26/17.    
No

## 2020-10-04 NOTE — TELEPHONE ENCOUNTER
"Home health nurse Berna notified.   Spoke with patient and informed. Patient states "it was low this morning, 117/76 after taking blood pressure medication (metoprolol) it was 92/60". Today patient states she is not feeling weak or faint. Patient states she will go to ER if she begins to feel dizzy or faint. She would like to know if you want her to adjust blood pressure medication. Please advise.     "
----- Message from Dory Gagnon sent at 4/1/2019  3:56 PM CDT -----  Contact: Berna with [facility] is calling to report that the patient out on the toilet earlier when she was being moved and her blood pressure now is 90/50; 3 plus pitting edema to the left leg; patient reports being weak and tired.  Call Back#[phone]  Thanks   
----- Message from Leanna Calderón sent at 4/1/2019  9:02 AM CDT -----  Contact: Martha lewis  Type:  RX Refill Request    Who Called:  Martha  Refill or New Rx:  refill  RX Name and Strength:  HYDROcodone-acetaminophen (NORCO)  mg per tablet  How is the patient currently taking it? (ex. 1XDay):  n/a  Is this a 30 day or 90 day RX:  30  Preferred Pharmacy with phone number:    [address] [address]  Phone: [phone] Fax: [phone]    Local or Mail Order:  local  Ordering Provider:  Rita Aleman Call Back Number:  [phone]  Additional Information:    
Berna( nurse) reports that pt is weak and last bp was 90/50. According to caregiver the pt blackout out on the toilet, but pt does not remember per  nurse. 3+ pitting to LLE, 2+pitting to RLE. Nurse states that she is unable to obtain orthostatics due to pt weakness. Nurse is considering getting a  involved please advise.  
For symptomatic hypotension, she is advised to be brought to the ER.  
Please make sure she has another appt.  
Pt last refilled 3/1/2019,last seen 11/9/2018 for surgery. Cancelled follow up appt 1/2019  
Spoke with patient and informed of message. Patient has appt next month and instructed to log BP and provide at next visit. Pt also advised to seek ER eval if she experiences chest pain, dizziness, syncope related to hypotension. Pt verbalized understanding.   
With her heart condition, I would recommend we keep the BP medications the same.  
ambulatory

## 2022-03-04 NOTE — TELEPHONE ENCOUNTER
Attempted to contact pt, unable to leave msg.    denies pain/discomfort Follow up with your private internist / PMD in 4-6 weeks re: general checkup (and possible medication adjustment)   Please call for an appointment

## 2022-03-17 NOTE — TELEPHONE ENCOUNTER
Hydrocodone will not be refilled since  has reported twice a large amount of missing pills. Family has either been stealing it of patient is giving it away.  She will need to use tylenol arthritis to manage her pain at this point.  Inpatient rehab has not been recommended for this patient by me.    Nursing home placement is what has been advised and suggested.   none
